# Patient Record
Sex: MALE | Race: WHITE | Employment: OTHER | URBAN - METROPOLITAN AREA
[De-identification: names, ages, dates, MRNs, and addresses within clinical notes are randomized per-mention and may not be internally consistent; named-entity substitution may affect disease eponyms.]

---

## 2017-01-04 ENCOUNTER — TRANSFERRED RECORDS (OUTPATIENT)
Dept: HEALTH INFORMATION MANAGEMENT | Facility: CLINIC | Age: 62
End: 2017-01-04

## 2017-03-06 ENCOUNTER — OFFICE VISIT (OUTPATIENT)
Dept: GASTROENTEROLOGY | Facility: CLINIC | Age: 62
End: 2017-03-06
Attending: INTERNAL MEDICINE
Payer: COMMERCIAL

## 2017-03-06 VITALS
HEART RATE: 96 BPM | HEIGHT: 71 IN | OXYGEN SATURATION: 97 % | TEMPERATURE: 98.1 F | WEIGHT: 172.5 LBS | SYSTOLIC BLOOD PRESSURE: 151 MMHG | DIASTOLIC BLOOD PRESSURE: 78 MMHG | BODY MASS INDEX: 24.15 KG/M2

## 2017-03-06 DIAGNOSIS — K74.60 CIRRHOSIS OF LIVER WITHOUT ASCITES, UNSPECIFIED HEPATIC CIRRHOSIS TYPE (H): Primary | ICD-10-CM

## 2017-03-06 DIAGNOSIS — K74.60 CIRRHOSIS OF LIVER WITH ASCITES, UNSPECIFIED HEPATIC CIRRHOSIS TYPE (H): ICD-10-CM

## 2017-03-06 DIAGNOSIS — R18.8 CIRRHOSIS OF LIVER WITH ASCITES, UNSPECIFIED HEPATIC CIRRHOSIS TYPE (H): ICD-10-CM

## 2017-03-06 DIAGNOSIS — K74.60 CIRRHOSIS OF LIVER WITHOUT ASCITES, UNSPECIFIED HEPATIC CIRRHOSIS TYPE (H): ICD-10-CM

## 2017-03-06 LAB
AFP SERPL-MCNC: 5.6 UG/L (ref 0–8)
ALBUMIN SERPL-MCNC: 3.2 G/DL (ref 3.4–5)
ALP SERPL-CCNC: 184 U/L (ref 40–150)
ALT SERPL W P-5'-P-CCNC: 28 U/L (ref 0–70)
ANION GAP SERPL CALCULATED.3IONS-SCNC: 8 MMOL/L (ref 3–14)
AST SERPL W P-5'-P-CCNC: 55 U/L (ref 0–45)
BILIRUB DIRECT SERPL-MCNC: 0.5 MG/DL (ref 0–0.2)
BILIRUB SERPL-MCNC: 1.2 MG/DL (ref 0.2–1.3)
BUN SERPL-MCNC: 9 MG/DL (ref 7–30)
CALCIUM SERPL-MCNC: 9 MG/DL (ref 8.5–10.1)
CHLORIDE SERPL-SCNC: 110 MMOL/L (ref 94–109)
CO2 SERPL-SCNC: 25 MMOL/L (ref 20–32)
CREAT SERPL-MCNC: 0.63 MG/DL (ref 0.66–1.25)
ERYTHROCYTE [DISTWIDTH] IN BLOOD BY AUTOMATED COUNT: 14.6 % (ref 10–15)
GFR SERPL CREATININE-BSD FRML MDRD: ABNORMAL ML/MIN/1.7M2
GLUCOSE SERPL-MCNC: 120 MG/DL (ref 70–99)
HCT VFR BLD AUTO: 36.3 % (ref 40–53)
HGB BLD-MCNC: 12.5 G/DL (ref 13.3–17.7)
INR PPP: 1.22 (ref 0.86–1.14)
MCH RBC QN AUTO: 31.3 PG (ref 26.5–33)
MCHC RBC AUTO-ENTMCNC: 34.4 G/DL (ref 31.5–36.5)
MCV RBC AUTO: 91 FL (ref 78–100)
PLATELET # BLD AUTO: 113 10E9/L (ref 150–450)
POTASSIUM SERPL-SCNC: 4.2 MMOL/L (ref 3.4–5.3)
PROT SERPL-MCNC: 6.2 G/DL (ref 6.8–8.8)
RBC # BLD AUTO: 4 10E12/L (ref 4.4–5.9)
SODIUM SERPL-SCNC: 142 MMOL/L (ref 133–144)
WBC # BLD AUTO: 12.4 10E9/L (ref 4–11)

## 2017-03-06 PROCEDURE — 90732 PPSV23 VACC 2 YRS+ SUBQ/IM: CPT | Mod: ZF

## 2017-03-06 PROCEDURE — 25000128 H RX IP 250 OP 636: Mod: ZF

## 2017-03-06 PROCEDURE — 99212 OFFICE O/P EST SF 10 MIN: CPT | Mod: 25,ZF

## 2017-03-06 PROCEDURE — 80048 BASIC METABOLIC PNL TOTAL CA: CPT | Performed by: INTERNAL MEDICINE

## 2017-03-06 PROCEDURE — 87522 HEPATITIS C REVRS TRNSCRPJ: CPT | Performed by: INTERNAL MEDICINE

## 2017-03-06 PROCEDURE — 82105 ALPHA-FETOPROTEIN SERUM: CPT | Performed by: INTERNAL MEDICINE

## 2017-03-06 PROCEDURE — 36415 COLL VENOUS BLD VENIPUNCTURE: CPT | Performed by: INTERNAL MEDICINE

## 2017-03-06 PROCEDURE — 80076 HEPATIC FUNCTION PANEL: CPT | Performed by: INTERNAL MEDICINE

## 2017-03-06 PROCEDURE — G0009 ADMIN PNEUMOCOCCAL VACCINE: HCPCS | Mod: ZF

## 2017-03-06 PROCEDURE — 85610 PROTHROMBIN TIME: CPT | Performed by: INTERNAL MEDICINE

## 2017-03-06 PROCEDURE — 85027 COMPLETE CBC AUTOMATED: CPT | Performed by: INTERNAL MEDICINE

## 2017-03-06 RX ORDER — AMILORIDE HYDROCHLORIDE 5 MG/1
10 TABLET ORAL DAILY
Qty: 180 TABLET | Refills: 3 | Status: SHIPPED | OUTPATIENT
Start: 2017-03-06 | End: 2018-03-28

## 2017-03-06 ASSESSMENT — PAIN SCALES - GENERAL: PAINLEVEL: MODERATE PAIN (5)

## 2017-03-06 NOTE — LETTER
3/6/2017       RE: Mehdi Martinez  [address] [facility] [address]     Dear Colleague,    Thank you for referring your patient, Mehdi Martinez, to the [facility]PAT[facility] at [facility]. Please see a copy of my visit note below.    I had the pleasure of seeing Mehdi Martinez for followup in the Liver Clinic at the [facility] on 03/05/2017.  Mr. 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  He did undergo a TIPS procedure.  He has been treated for his hepatitis C, but I am not sure he has actually had an HCV RNA 3 months post-transplant to determine whether he is a sustained virologic responder.      His new complaint at this visit is that he has painful gynecomastia.  He saw his oncologist who obtained mammograms which reportedly were okay.  This is certainly due to his spironolactone.      He otherwise denies any abdominal pain, itching or skin rash.  He does have mild fatigue.  He denies any increased abdominal girth or lower extremity edema.  He denies any fevers or chills, cough or shortness of breath.  He denies any nausea, vomiting or constipation.  He is having 1-3 bowel movements per day on his current dose of lactulose.  He has not had any gastrointestinal bleeding or any overt signs of encephalopathy.       Current Outpatient Prescriptions   Medication     aMILoride (MIDAMOR) 5 MG tablet     lactulose (CHRONULAC) 10 GM/15ML solution     rifaximin (XIFAXAN) 550 MG TABS     furosemide (LASIX) 40 MG tablet     zinc sulfate (ZINCATE) 220 MG capsule     metFORMIN (GLUCOPHAGE) 1000 MG tablet     cetirizine (ZYRTEC) 10 MG tablet     fluticasone (FLONASE) 50 MCG/ACT nasal spray     No current facility-administered medications for this visit.      B/P: 151/78, T: 98.1, P: 96, R: Data Unavailable    HEENT exam shows no scleral icterus.  He has mild temporal muscle wasting.  His  chest is clear.  His abdominal exam shows no increase in girth.  No masses or tenderness to palpation are present.  His liver is 10-11 cm in span with a slightly prominent left lobe.  No spleen tip is palpable, and extremity exam shows no edema.  Skin exam shows some palmar erythema without spider angiomata, and neurologic exam shows no asterixis.       Recent Results (from the past 168 hour(s))    Hepatic Panel [LAB20]    Collection Time: 03/06/17 11:29 AM   Result Value Ref Range    Bilirubin Direct 0.5 (H) 0.0 - 0.2 mg/dL    Bilirubin Total 1.2 0.2 - 1.3 mg/dL    Albumin 3.2 (L) 3.4 - 5.0 g/dL    Protein Total 6.2 (L) 6.8 - 8.8 g/dL    Alkaline Phosphatase 184 (H) 40 - 150 U/L    ALT 28 0 - 70 U/L    AST 55 (H) 0 - 45 U/L   Basic metabolic panel [LAB15]    Collection Time: 03/06/17 11:29 AM   Result Value Ref Range    Sodium 142 133 - 144 mmol/L    Potassium 4.2 3.4 - 5.3 mmol/L    Chloride 110 (H) 94 - 109 mmol/L    Carbon Dioxide 25 20 - 32 mmol/L    Anion Gap 8 3 - 14 mmol/L    Glucose 120 (H) 70 - 99 mg/dL    Urea Nitrogen 9 7 - 30 mg/dL    Creatinine 0.63 (L) 0.66 - 1.25 mg/dL    GFR Estimate >90  Non  GFR Calc   >60 mL/min/1.7m2    GFR Estimate If Black >90   GFR Calc   >60 mL/min/1.7m2    Calcium 9.0 8.5 - 10.1 mg/dL   CBC with platelets [VAC838]    Collection Time: 03/06/17 11:29 AM   Result Value Ref Range    WBC 12.4 (H) 4.0 - 11.0 10e9/L    RBC Count 4.00 (L) 4.4 - 5.9 10e12/L    Hemoglobin 12.5 (L) 13.3 - 17.7 g/dL    Hematocrit 36.3 (L) 40.0 - 53.0 %    MCV 91 78 - 100 fl    MCH 31.3 26.5 - 33.0 pg    MCHC 34.4 31.5 - 36.5 g/dL    RDW 14.6 10.0 - 15.0 %    Platelet Count 113 (L) 150 - 450 10e9/L   INR [[id number]]    Collection Time: 03/06/17 11:29 AM   Result Value Ref Range    INR 1.22 (H) 0.86 - 1.14   AFP tumor marker [BUV406]    Collection Time: 03/06/17 11:29 AM   Result Value Ref Range    Alpha Fetoprotein 5.6 0 - 8 ug/L      I did review his ultrasound which shows  good TIPS flow velocities.  He had just a trivial amount of ascites, and no mass lesions are noted.      My impression is that Mr. 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  I will get an HCV RNA level today to determine whether he is a sustained virologic responder or not.  His TIPS seems to be working well.  I will discontinue the spironolactone which is certainly causing his painful gynecomastia and switch him to amiloride 10 mg per day.      He does need a Pneumovax today to finish his vaccination schedule.  He also needs a bone density study done, which I have recommended he have done in [place].  Otherwise, plan will be to see him back in the clinic in 6 months with a repeat ultrasound and blood work.      Thank you very much for allowing me to participate in the care of this patient.  If you have any questions regarding my recommendations, please do not hesitate to contact me.       Juancho Willard MD      Professor of Medicine  [employer]      Executive Medical Director, Solid Organ Transplant Program  [facility]

## 2017-03-06 NOTE — LETTER
March 7, 2017       TO: Mehdi Martinez  formerly [facility] [address] [facility] [address]       Dear Mr. Martinez,    We are writing to inform you of your test results.  Discussed in clinic.    Resulted Orders    Hepatic Panel [LAB20]   Result Value Ref Range    Bilirubin Direct 0.5 (H) 0.0 - 0.2 mg/dL    Bilirubin Total 1.2 0.2 - 1.3 mg/dL    Albumin 3.2 (L) 3.4 - 5.0 g/dL    Protein Total 6.2 (L) 6.8 - 8.8 g/dL    Alkaline Phosphatase 184 (H) 40 - 150 U/L    ALT 28 0 - 70 U/L    AST 55 (H) 0 - 45 U/L   Basic metabolic panel [LAB15]   Result Value Ref Range    Sodium 142 133 - 144 mmol/L    Potassium 4.2 3.4 - 5.3 mmol/L    Chloride 110 (H) 94 - 109 mmol/L    Carbon Dioxide 25 20 - 32 mmol/L    Anion Gap 8 3 - 14 mmol/L    Glucose 120 (H) 70 - 99 mg/dL    Urea Nitrogen 9 7 - 30 mg/dL    Creatinine 0.63 (L) 0.66 - 1.25 mg/dL    GFR Estimate >90  Non  GFR Calc   >60 mL/min/1.7m2    GFR Estimate If Black >90   GFR Calc   >60 mL/min/1.7m2    Calcium 9.0 8.5 - 10.1 mg/dL   CBC with platelets [MFR535]   Result Value Ref Range    WBC 12.4 (H) 4.0 - 11.0 10e9/L    RBC Count 4.00 (L) 4.4 - 5.9 10e12/L    Hemoglobin 12.5 (L) 13.3 - 17.7 g/dL    Hematocrit 36.3 (L) 40.0 - 53.0 %    MCV 91 78 - 100 fl    MCH 31.3 26.5 - 33.0 pg    MCHC 34.4 31.5 - 36.5 g/dL    RDW 14.6 10.0 - 15.0 %    Platelet Count 113 (L) 150 - 450 10e9/L   INR [[id number]]   Result Value Ref Range    INR 1.22 (H) 0.86 - 1.14   AFP tumor marker [RXQ125]   Result Value Ref Range    Alpha Fetoprotein 5.6 0 - 8 ug/L      Comment:      Assay Method:  Chemiluminescence using Siemens Centaur XP         It was a pleasure to see you at your recent visit. Please let me know if you have any questions or concerns.     Clinic Staff - [phone]     Sincerely,     Juancho Willard MD  [address] [place] [address] [address] [place] [id number]

## 2017-03-06 NOTE — MR AVS SNAPSHOT
After Visit Summary   3/6/2017    Mehdi Martinez    MRN: [id number]           Patient Information     Date Of Birth          1955        Visit Information        Provider Department      3/6/2017 1:45 PM Juancho Willard MD [facility]        Today's Diagnoses     Cirrhosis of liver without ascites, unspecified hepatic cirrhosis type (H)    -  1       Follow-ups after your visit        Your next 10 appointments already scheduled     Sep 05, 2017 10:00 AM CDT   Lab with  LAB   [facility] ([facility])    [address] [place] [id number] [phone]            Sep 05, 2017 10:30 AM CDT   US ABDOMEN COMPLETE with US[address] [place] [address] [facility] ([facility])    [address] [place] [id number] [phone]           Please bring a list of your medicines (including vitamins, minerals and over-the-counter drugs). Also, tell your doctor about any allergies you may have. Wear comfortable clothes and leave your valuables at home.  Adults: No eating or drinking for 8 hours before the exam. You may take medicine with a small sip of water.  Children: - Children 6+ years: No food or drink for 6 hours before exam. - Children 1-5 years: No food or drink for 4 hours before exam. - Infants, breast-fed: may have breast milk up to 2 hours before exam. - Infants, formula: may have bottle until 4 hours before exam.  Please call the Imaging Department at your exam site with any questions.            Sep 05, 2017 11:30 AM CDT   (Arrive by 11:15 AM)   Return General Liver with Juancho Willard MD   [facility] ([facility])    [address] [place] [id number] [phone]              Future tests that were ordered for you today     Open Standing Orders        Priority Remaining Interval Expires Ordered    US abdomen complete [IEN952] Routine 2/2  "Every 6 Months 2018 3/6/2017          Open Future Orders        Priority Expected Expires Ordered     Hepatic Panel [LAB20] Routine 2017 2018 3/6/2017    Basic metabolic panel [LAB15] Routine 2017 2018 3/6/2017    CBC with platelets [QKF531] Routine 2017 2018 3/6/2017    INR [[id number]] Routine 2017 2018 3/6/2017    AFP tumor marker [DIX840] Routine 2017 2018 3/6/2017    Hepatitis C RNA quantitative Routine  2017 3/6/2017            Who to contact     If you have questions or need follow up information about today's clinic visit or your schedule please contact [facility]PAT[facility] directly at [phone].  Normal or non-critical lab and imaging results will be communicated to you by MyChart, letter or phone within 4 business days after the clinic has received the results. If you do not hear from us within 7 days, please contact the clinic through [employer] or phone. If you have a critical or abnormal lab result, we will notify you by phone as soon as possible.  Submit refill requests through [employer] or call your pharmacy and they will forward the refill request to us. Please allow 3 business days for your refill to be completed.          Additional Information About Your Visit        [employer] Information     [employer] lets you send messages to your doctor, view your test results, renew your prescriptions, schedule appointments and more. To sign up, go to [email or web address] . Click on \"Log in\" on the left side of the screen, which will take you to the Welcome page. Then click on \"Sign up Now\" on the right side of the page.     You will be asked to enter the access code listed below, as well as some personal information. Please follow the directions to create your username and password.     Your access code is: [email or web address]  Expires: 2017  1:59 PM     Your access code will  in 90 days. If you need help or a new code, please call your [place] clinic or " "[phone].        Care EveryWhere ID     This is your Care EveryWhere ID. This could be used by other organizations to access your [place] medical records  [id number]        Your Vitals Were     Pulse Temperature Height Pulse Oximetry BMI (Body Mass Index)       96 98.1  F (36.7  C) (Oral) 1.803 m (5' 11\") 97% 24.06 kg/m2        Blood Pressure from Last 3 Encounters:   03/06/17 151/78   11/29/16 148/81   10/18/16 149/78    Weight from Last 3 Encounters:   03/06/17 78.2 kg (172 lb 8 oz)   11/29/16 74.8 kg (165 lb)   10/18/16 82.2 kg (181 lb 3.2 oz)              We Performed the Following     Pneumococcal Vaccine Adult, SQ or IM     Schedule follow up appointments          Today's Medication Changes          These changes are accurate as of: 3/6/17  1:59 PM.  If you have any questions, ask your nurse or doctor.               Start taking these medicines.        Dose/Directions    aMILoride 5 MG tablet   Commonly known as:  MIDAMOR   Used for:  Cirrhosis of liver without ascites, unspecified hepatic cirrhosis type (H)   Started by:  Juancho Willard MD        Dose:  10 mg   Take 2 tablets (10 mg) by mouth daily   Quantity:  180 tablet   Refills:  3         These medicines have changed or have updated prescriptions.        Dose/Directions    furosemide 40 MG tablet   Commonly known as:  LASIX   This may have changed:  how much to take   Used for:  Alcoholic cirrhosis of liver with ascites (H)        Dose:  80 mg   Take 2 tablets (80 mg) by mouth daily   Quantity:  60 tablet   Refills:  3       lactulose 10 GM/15ML solution   Commonly known as:  CHRONULAC   This may have changed:    - when to take this  - additional instructions   Used for:  Hepatic encephalopathy (H)        Dose:  20 g   Take 30 mLs (20 g) by mouth 3 times daily Ensure you have 3 loose BMs/day.   Quantity:  8100 mL   Refills:  3         Stop taking these medicines if you haven't already. Please contact your care team if you have questions.     " spironolactone 25 MG tablet   Commonly known as:  ALDACTONE   Stopped by:  Juancho Willard MD                Where to get your medicines      These medications were sent to [facility] - [place], [place] - [address] [facility] [address]  [address] [facility] [place], [place] [address]     Phone:  [phone]     aMILoride 5 MG tablet                Primary Care Provider Office Phone # Fax #    Elvin BLUE Advernell [phone]       [place] [facility] [address]  [facility] [address]        Thank you!     Thank you for choosing [facility]PAT[facility]  for your care. Our goal is always to provide you with excellent care. Hearing back from our patients is one way we can continue to improve our services. Please take a few minutes to complete the written survey that you may receive in the mail after your visit with us. Thank you!             Your Updated Medication List - Protect others around you: Learn how to safely use, store and throw away your medicines at www.disposemymeds.org.          This list is accurate as of: 3/6/17  1:59 PM.  Always use your most recent med list.                   Brand Name Dispense Instructions for use    aMILoride 5 MG tablet    MIDAMOR    180 tablet    Take 2 tablets (10 mg) by mouth daily       cetirizine 10 MG tablet    zyrTEC     Take 10 mg by mouth Reported on 3/6/2017       fluticasone 50 MCG/ACT spray    FLONASE     2 sprays Reported on 3/6/2017       furosemide 40 MG tablet    LASIX    60 tablet    Take 2 tablets (80 mg) by mouth daily       lactulose 10 GM/15ML solution    CHRONULAC    8100 mL    Take 30 mLs (20 g) by mouth 3 times daily Ensure you have 3 loose BMs/day.       metFORMIN 1000 MG tablet    GLUCOPHAGE     Take 1,000 mg by mouth daily (with dinner)       rifaximin 550 MG Tabs tablet    XIFAXAN    60 tablet    Take 1 tablet (550 mg) by mouth 2 times daily       zinc sulfate 220 MG capsule    ZINCATE     Take 50 mg by mouth daily

## 2017-03-06 NOTE — NURSING NOTE
Chief Complaint   Patient presents with     RECHECK     Cirrhosis of Liver with ascites   Pt roomed, vitals, meds, and allergies reviewed with pt. Pt ready for provider.  Steve Mckinney, CMA

## 2017-03-06 NOTE — PROGRESS NOTES
I had the pleasure of seeing Mehdi Martinez for followup in the Liver Clinic at the [facility] on 03/05/2017.  Mr. 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  He did undergo a TIPS procedure.  He has been treated for his hepatitis C, but I am not sure he has actually had an HCV RNA 3 months post-transplant to determine whether he is a sustained virologic responder.      His new complaint at this visit is that he has painful gynecomastia.  He saw his oncologist who obtained mammograms which reportedly were okay.  This is certainly due to his spironolactone.      He otherwise denies any abdominal pain, itching or skin rash.  He does have mild fatigue.  He denies any increased abdominal girth or lower extremity edema.  He denies any fevers or chills, cough or shortness of breath.  He denies any nausea, vomiting or constipation.  He is having 1-3 bowel movements per day on his current dose of lactulose.  He has not had any gastrointestinal bleeding or any overt signs of encephalopathy.       Current Outpatient Prescriptions   Medication     aMILoride (MIDAMOR) 5 MG tablet     lactulose (CHRONULAC) 10 GM/15ML solution     rifaximin (XIFAXAN) 550 MG TABS     furosemide (LASIX) 40 MG tablet     zinc sulfate (ZINCATE) 220 MG capsule     metFORMIN (GLUCOPHAGE) 1000 MG tablet     cetirizine (ZYRTEC) 10 MG tablet     fluticasone (FLONASE) 50 MCG/ACT nasal spray     No current facility-administered medications for this visit.      B/P: 151/78, T: 98.1, P: 96, R: Data Unavailable    HEENT exam shows no scleral icterus.  He has mild temporal muscle wasting.  His chest is clear.  His abdominal exam shows no increase in girth.  No masses or tenderness to palpation are present.  His liver is 10-11 cm in span with a slightly prominent left lobe.  No spleen tip is palpable, and extremity exam shows no edema.  Skin exam shows some palmar  erythema without spider angiomata, and neurologic exam shows no asterixis.       Recent Results (from the past 168 hour(s))    Hepatic Panel [LAB20]    Collection Time: 03/06/17 11:29 AM   Result Value Ref Range    Bilirubin Direct 0.5 (H) 0.0 - 0.2 mg/dL    Bilirubin Total 1.2 0.2 - 1.3 mg/dL    Albumin 3.2 (L) 3.4 - 5.0 g/dL    Protein Total 6.2 (L) 6.8 - 8.8 g/dL    Alkaline Phosphatase 184 (H) 40 - 150 U/L    ALT 28 0 - 70 U/L    AST 55 (H) 0 - 45 U/L   Basic metabolic panel [LAB15]    Collection Time: 03/06/17 11:29 AM   Result Value Ref Range    Sodium 142 133 - 144 mmol/L    Potassium 4.2 3.4 - 5.3 mmol/L    Chloride 110 (H) 94 - 109 mmol/L    Carbon Dioxide 25 20 - 32 mmol/L    Anion Gap 8 3 - 14 mmol/L    Glucose 120 (H) 70 - 99 mg/dL    Urea Nitrogen 9 7 - 30 mg/dL    Creatinine 0.63 (L) 0.66 - 1.25 mg/dL    GFR Estimate >90  Non  GFR Calc   >60 mL/min/1.7m2    GFR Estimate If Black >90   GFR Calc   >60 mL/min/1.7m2    Calcium 9.0 8.5 - 10.1 mg/dL   CBC with platelets [VCR841]    Collection Time: 03/06/17 11:29 AM   Result Value Ref Range    WBC 12.4 (H) 4.0 - 11.0 10e9/L    RBC Count 4.00 (L) 4.4 - 5.9 10e12/L    Hemoglobin 12.5 (L) 13.3 - 17.7 g/dL    Hematocrit 36.3 (L) 40.0 - 53.0 %    MCV 91 78 - 100 fl    MCH 31.3 26.5 - 33.0 pg    MCHC 34.4 31.5 - 36.5 g/dL    RDW 14.6 10.0 - 15.0 %    Platelet Count 113 (L) 150 - 450 10e9/L   INR [[id number]]    Collection Time: 03/06/17 11:29 AM   Result Value Ref Range    INR 1.22 (H) 0.86 - 1.14   AFP tumor marker [IBE720]    Collection Time: 03/06/17 11:29 AM   Result Value Ref Range    Alpha Fetoprotein 5.6 0 - 8 ug/L      I did review his ultrasound which shows good TIPS flow velocities.  He had just a trivial amount of ascites, and no mass lesions are noted.      My impression is that Mr. 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  I will get an HCV RNA level today to determine whether he is a sustained virologic  responder or not.  His TIPS seems to be working well.  I will discontinue the spironolactone which is certainly causing his painful gynecomastia and switch him to amiloride 10 mg per day.      He does need a Pneumovax today to finish his vaccination schedule.  He also needs a bone density study done, which I have recommended he have done in [place].  Otherwise, plan will be to see him back in the clinic in 6 months with a repeat ultrasound and blood work.      Thank you very much for allowing me to participate in the care of this patient.  If you have any questions regarding my recommendations, please do not hesitate to contact me.       Juancho Willard MD      Professor of Medicine  [employer]      Executive Medical Director, Solid Organ Transplant Program  [facility]

## 2017-03-06 NOTE — LETTER
3/6/2017      RE: Mehdi Martinez  [address] [facility] [address]       I had the pleasure of seeing Mehdi Martinez for followup in the Liver Clinic at the [facility] on 03/05/2017.  Mr. 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  He did undergo a TIPS procedure.  He has been treated for his hepatitis C, but I am not sure he has actually had an HCV RNA 3 months post-transplant to determine whether he is a sustained virologic responder.      His new complaint at this visit is that he has painful gynecomastia.  He saw his oncologist who obtained mammograms which reportedly were okay.  This is certainly due to his spironolactone.      He otherwise denies any abdominal pain, itching or skin rash.  He does have mild fatigue.  He denies any increased abdominal girth or lower extremity edema.  He denies any fevers or chills, cough or shortness of breath.  He denies any nausea, vomiting or constipation.  He is having 1-3 bowel movements per day on his current dose of lactulose.  He has not had any gastrointestinal bleeding or any overt signs of encephalopathy.       Current Outpatient Prescriptions   Medication     aMILoride (MIDAMOR) 5 MG tablet     lactulose (CHRONULAC) 10 GM/15ML solution     rifaximin (XIFAXAN) 550 MG TABS     furosemide (LASIX) 40 MG tablet     zinc sulfate (ZINCATE) 220 MG capsule     metFORMIN (GLUCOPHAGE) 1000 MG tablet     cetirizine (ZYRTEC) 10 MG tablet     fluticasone (FLONASE) 50 MCG/ACT nasal spray     No current facility-administered medications for this visit.      B/P: 151/78, T: 98.1, P: 96, R: Data Unavailable    HEENT exam shows no scleral icterus.  He has mild temporal muscle wasting.  His chest is clear.  His abdominal exam shows no increase in girth.  No masses or tenderness to palpation are present.  His liver is 10-11 cm in span with a slightly prominent left lobe.  No spleen tip  is palpable, and extremity exam shows no edema.  Skin exam shows some palmar erythema without spider angiomata, and neurologic exam shows no asterixis.       Recent Results (from the past 168 hour(s))    Hepatic Panel [LAB20]    Collection Time: 03/06/17 11:29 AM   Result Value Ref Range    Bilirubin Direct 0.5 (H) 0.0 - 0.2 mg/dL    Bilirubin Total 1.2 0.2 - 1.3 mg/dL    Albumin 3.2 (L) 3.4 - 5.0 g/dL    Protein Total 6.2 (L) 6.8 - 8.8 g/dL    Alkaline Phosphatase 184 (H) 40 - 150 U/L    ALT 28 0 - 70 U/L    AST 55 (H) 0 - 45 U/L   Basic metabolic panel [LAB15]    Collection Time: 03/06/17 11:29 AM   Result Value Ref Range    Sodium 142 133 - 144 mmol/L    Potassium 4.2 3.4 - 5.3 mmol/L    Chloride 110 (H) 94 - 109 mmol/L    Carbon Dioxide 25 20 - 32 mmol/L    Anion Gap 8 3 - 14 mmol/L    Glucose 120 (H) 70 - 99 mg/dL    Urea Nitrogen 9 7 - 30 mg/dL    Creatinine 0.63 (L) 0.66 - 1.25 mg/dL    GFR Estimate >90  Non  GFR Calc   >60 mL/min/1.7m2    GFR Estimate If Black >90   GFR Calc   >60 mL/min/1.7m2    Calcium 9.0 8.5 - 10.1 mg/dL   CBC with platelets [[id number]]    Collection Time: 03/06/17 11:29 AM   Result Value Ref Range    WBC 12.4 (H) 4.0 - 11.0 10e9/L    RBC Count 4.00 (L) 4.4 - 5.9 10e12/L    Hemoglobin 12.5 (L) 13.3 - 17.7 g/dL    Hematocrit 36.3 (L) 40.0 - 53.0 %    MCV 91 78 - 100 fl    MCH 31.3 26.5 - 33.0 pg    MCHC 34.4 31.5 - 36.5 g/dL    RDW 14.6 10.0 - 15.0 %    Platelet Count 113 (L) 150 - 450 10e9/L   INR [[id number]]    Collection Time: 03/06/17 11:29 AM   Result Value Ref Range    INR 1.22 (H) 0.86 - 1.14   AFP tumor marker [[id number]]    Collection Time: 03/06/17 11:29 AM   Result Value Ref Range    Alpha Fetoprotein 5.6 0 - 8 ug/L      I did review his ultrasound which shows good TIPS flow velocities.  He had just a trivial amount of ascites, and no mass lesions are noted.      My impression is that Mr. 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  I will get  an HCV RNA level today to determine whether he is a sustained virologic responder or not.  His TIPS seems to be working well.  I will discontinue the spironolactone which is certainly causing his painful gynecomastia and switch him to amiloride 10 mg per day.      He does need a Pneumovax today to finish his vaccination schedule.  He also needs a bone density study done, which I have recommended he have done in [place].  Otherwise, plan will be to see him back in the clinic in 6 months with a repeat ultrasound and blood work.      Thank you very much for allowing me to participate in the care of this patient.  If you have any questions regarding my recommendations, please do not hesitate to contact me.       Juancho Willard MD      Professor of Medicine  [employer]      Executive Medical Director, Solid Organ Transplant Program  [facility]

## 2017-03-06 NOTE — LETTER
March 9, 2017       TO: Mehdi Martinez  [facility] [address] [facility] [address]       Dear Mr. Martinez,    We are writing to inform you of your test results.  Cured !    Resulted Orders    Hepatic Panel [LAB20]   Result Value Ref Range    Bilirubin Direct 0.5 (H) 0.0 - 0.2 mg/dL    Bilirubin Total 1.2 0.2 - 1.3 mg/dL    Albumin 3.2 (L) 3.4 - 5.0 g/dL    Protein Total 6.2 (L) 6.8 - 8.8 g/dL    Alkaline Phosphatase 184 (H) 40 - 150 U/L    ALT 28 0 - 70 U/L    AST 55 (H) 0 - 45 U/L   Basic metabolic panel [LAB15]   Result Value Ref Range    Sodium 142 133 - 144 mmol/L    Potassium 4.2 3.4 - 5.3 mmol/L    Chloride 110 (H) 94 - 109 mmol/L    Carbon Dioxide 25 20 - 32 mmol/L    Anion Gap 8 3 - 14 mmol/L    Glucose 120 (H) 70 - 99 mg/dL    Urea Nitrogen 9 7 - 30 mg/dL    Creatinine 0.63 (L) 0.66 - 1.25 mg/dL    GFR Estimate >90  Non  GFR Calc   >60 mL/min/1.7m2    GFR Estimate If Black >90   GFR Calc   >60 mL/min/1.7m2    Calcium 9.0 8.5 - 10.1 mg/dL   CBC with platelets [[id number]]   Result Value Ref Range    WBC 12.4 (H) 4.0 - 11.0 10e9/L    RBC Count 4.00 (L) 4.4 - 5.9 10e12/L    Hemoglobin 12.5 (L) 13.3 - 17.7 g/dL    Hematocrit 36.3 (L) 40.0 - 53.0 %    MCV 91 78 - 100 fl    MCH 31.3 26.5 - 33.0 pg    MCHC 34.4 31.5 - 36.5 g/dL    RDW 14.6 10.0 - 15.0 %    Platelet Count 113 (L) 150 - 450 10e9/L   INR [[id number]]   Result Value Ref Range    INR 1.22 (H) 0.86 - 1.14   AFP tumor marker [[id number]]   Result Value Ref Range    Alpha Fetoprotein 5.6 0 - 8 ug/L      Comment:      Assay Method:  Chemiluminescence using Siemens Centaur XP   Hepatitis C RNA quantitative   Result Value Ref Range    HCV RNA Quant IU/ml  HCVND [IU]/mL     HCV RNA Not Detected   The SAPPHIRE AmpliPrep/SAPPHIRE TaqMan HCV Test is an FDA-approved in vitro nucleic   acid amplification test for the quantitation of HCV RNA in human plasma (ETDA   plasma) or serum using the SAPPHIRE AmpliPrep Instrument for automated viral   nucleic  acid extraction and the SAPPHIRE TaqMan Analyzer or SAPPHIRE TaqMan for   automated Real Time PCR amplification and detection of the viral nucleic acid   target.   Titer results are reported in International Units/mL (IU/mL) using the 1st WHO   International standard for HCV for Nucleic Acid Amplification based assays.      Log of HCV RNA Qt Not Calculated <1.2 Log IU/mL         It was a pleasure to see you at your recent visit. Please let me know if you have any questions or concerns.     Clinic Staff - [phone]     Sincerely,     Juancho Willrad MD  [address] [place] [id number]

## 2017-03-09 LAB
HCV RNA SERPL NAA+PROBE-ACNC: NORMAL [IU]/ML
HCV RNA SERPL NAA+PROBE-LOG IU: NORMAL LOG IU/ML

## 2017-04-21 ENCOUNTER — TRANSFERRED RECORDS (OUTPATIENT)
Dept: HEALTH INFORMATION MANAGEMENT | Facility: CLINIC | Age: 62
End: 2017-04-21

## 2017-06-09 ENCOUNTER — TRANSFERRED RECORDS (OUTPATIENT)
Dept: HEALTH INFORMATION MANAGEMENT | Facility: CLINIC | Age: 62
End: 2017-06-09

## 2017-06-09 DIAGNOSIS — K65.2 SBP (SPONTANEOUS BACTERIAL PERITONITIS) (H): ICD-10-CM

## 2017-06-09 RX ORDER — SULFAMETHOXAZOLE/TRIMETHOPRIM 800-160 MG
TABLET ORAL
Qty: 90 TABLET | Refills: 3 | Status: SHIPPED | OUTPATIENT
Start: 2017-06-09 | End: 2021-09-01

## 2017-06-09 NOTE — NURSING NOTE
LVM for patient that per Dr. Willard patient should continue taken Sulfamethoxazole-trimethoprim as ordered.  Med refilled.      RE: Sulfamethoxazole-trimethoprim  Received: Yesterday       Juancho Willard MD Beckenbach, Shannon, LPN                   Yes.            Previous Messages       ----- Message -----      From: Morena Crowley LPN      Sent: 6/8/2017  10:21 AM        To: Juancho Willard MD   Subject: Sulfamethoxazole-trimethoprim                     Patient wondering if you wanted him to refill the above medication?  Not listed on current med list, but is listed in history.

## 2017-06-16 ENCOUNTER — TELEPHONE (OUTPATIENT)
Dept: GASTROENTEROLOGY | Facility: CLINIC | Age: 62
End: 2017-06-16

## 2017-06-16 DIAGNOSIS — K74.69 OTHER CIRRHOSIS OF LIVER (H): Primary | ICD-10-CM

## 2017-06-16 NOTE — TELEPHONE ENCOUNTER
Discussed below symptoms with Dr. Willard in clinic.  Per Dr. Willard patient should follow-up with his PCP again if patient feels symptoms have not improved or have become worse or be seen in the ED.  Writer called back patient and spoke with his wife Emmie pt was out taken a walk.  Emmie voiced understanding and stated she was going to call their childern and try and convince pt to be see in the ED if unable to get appointment to see his primary today.        Previous Messages       ----- Message -----      From: Morena Crowley LPN      Sent: 6/15/2017  10:10 AM        To: Juancho Willard MD   Subject: Symptoms                                         Patient called stating he feels like he has pleurisy c/o chest pain that worsens during breathing x 7 wks, also not sleeping well.  He has followed up with both his PCP & oncologist who cant find anything wrong, pt said they did blood work and CXR.  He is wanting your input on the situation, could it be a side effect of one of his medications?

## 2017-06-19 NOTE — TELEPHONE ENCOUNTER
Writer called patient this morning to follow-up, pt answered the phone and reports that symptoms have improved a little he did not see his PCP on Friday or goto the ER over the weekend.  Us with doppler ordered by Dr. Willard to check TIPS patency , order faxed for [facility] imaging this morning, patient updated and will call and schedule own imaging appointment.

## 2017-06-20 ENCOUNTER — TRANSFERRED RECORDS (OUTPATIENT)
Dept: HEALTH INFORMATION MANAGEMENT | Facility: CLINIC | Age: 62
End: 2017-06-20

## 2017-09-05 ENCOUNTER — OFFICE VISIT (OUTPATIENT)
Dept: GASTROENTEROLOGY | Facility: CLINIC | Age: 62
End: 2017-09-05
Attending: INTERNAL MEDICINE
Payer: COMMERCIAL

## 2017-09-05 VITALS
OXYGEN SATURATION: 98 % | BODY MASS INDEX: 24.64 KG/M2 | SYSTOLIC BLOOD PRESSURE: 151 MMHG | HEART RATE: 77 BPM | WEIGHT: 176 LBS | HEIGHT: 71 IN | DIASTOLIC BLOOD PRESSURE: 80 MMHG

## 2017-09-05 DIAGNOSIS — K70.30 ALCOHOLIC CIRRHOSIS OF LIVER WITHOUT ASCITES (H): Primary | ICD-10-CM

## 2017-09-05 DIAGNOSIS — K74.60 CIRRHOSIS OF LIVER WITHOUT ASCITES, UNSPECIFIED HEPATIC CIRRHOSIS TYPE (H): ICD-10-CM

## 2017-09-05 LAB
AFP SERPL-MCNC: 6 UG/L (ref 0–8)
ALBUMIN SERPL-MCNC: 3.6 G/DL (ref 3.4–5)
ALP SERPL-CCNC: 210 U/L (ref 40–150)
ALT SERPL W P-5'-P-CCNC: 43 U/L (ref 0–70)
ANION GAP SERPL CALCULATED.3IONS-SCNC: 7 MMOL/L (ref 3–14)
AST SERPL W P-5'-P-CCNC: 66 U/L (ref 0–45)
BILIRUB DIRECT SERPL-MCNC: 0.4 MG/DL (ref 0–0.2)
BILIRUB SERPL-MCNC: 1.3 MG/DL (ref 0.2–1.3)
BUN SERPL-MCNC: 8 MG/DL (ref 7–30)
CALCIUM SERPL-MCNC: 9.2 MG/DL (ref 8.5–10.1)
CHLORIDE SERPL-SCNC: 109 MMOL/L (ref 94–109)
CO2 SERPL-SCNC: 25 MMOL/L (ref 20–32)
CREAT SERPL-MCNC: 0.74 MG/DL (ref 0.66–1.25)
ERYTHROCYTE [DISTWIDTH] IN BLOOD BY AUTOMATED COUNT: 14.9 % (ref 10–15)
GFR SERPL CREATININE-BSD FRML MDRD: >90 ML/MIN/1.7M2
GLUCOSE SERPL-MCNC: 86 MG/DL (ref 70–99)
HCT VFR BLD AUTO: 42.9 % (ref 40–53)
HGB BLD-MCNC: 14.1 G/DL (ref 13.3–17.7)
INR PPP: 1.16 (ref 0.86–1.14)
MCH RBC QN AUTO: 30.8 PG (ref 26.5–33)
MCHC RBC AUTO-ENTMCNC: 32.9 G/DL (ref 31.5–36.5)
MCV RBC AUTO: 94 FL (ref 78–100)
PLATELET # BLD AUTO: 96 10E9/L (ref 150–450)
POTASSIUM SERPL-SCNC: 4.2 MMOL/L (ref 3.4–5.3)
PROT SERPL-MCNC: 6.9 G/DL (ref 6.8–8.8)
RBC # BLD AUTO: 4.58 10E12/L (ref 4.4–5.9)
SODIUM SERPL-SCNC: 141 MMOL/L (ref 133–144)
WBC # BLD AUTO: 17.9 10E9/L (ref 4–11)

## 2017-09-05 PROCEDURE — 99212 OFFICE O/P EST SF 10 MIN: CPT | Mod: ZF

## 2017-09-05 PROCEDURE — 82105 ALPHA-FETOPROTEIN SERUM: CPT | Performed by: INTERNAL MEDICINE

## 2017-09-05 PROCEDURE — 36415 COLL VENOUS BLD VENIPUNCTURE: CPT | Performed by: INTERNAL MEDICINE

## 2017-09-05 PROCEDURE — 85027 COMPLETE CBC AUTOMATED: CPT | Performed by: INTERNAL MEDICINE

## 2017-09-05 PROCEDURE — 80076 HEPATIC FUNCTION PANEL: CPT | Performed by: INTERNAL MEDICINE

## 2017-09-05 PROCEDURE — 80048 BASIC METABOLIC PNL TOTAL CA: CPT | Performed by: INTERNAL MEDICINE

## 2017-09-05 PROCEDURE — 85610 PROTHROMBIN TIME: CPT | Performed by: INTERNAL MEDICINE

## 2017-09-05 NOTE — MR AVS SNAPSHOT
After Visit Summary   9/5/2017    Mehdi Martinez    MRN: [id number]           Patient Information     Date Of Birth          1955        Visit Information        Provider Department      9/5/2017 11:30 AM Juancho Willard MD [facility] Hepatology        Today's Diagnoses     Alcoholic cirrhosis of liver without ascites (H)    -  1       Follow-ups after your visit        Follow-up notes from your care team     Return in about 6 months (around 3/5/2018).      Your next 10 appointments already scheduled     Mar 06, 2018 10:00 AM CST   Lab with  LAB   [facility] Lab ([facility])    [address] [place] [address] [id number] [phone]            Mar 06, 2018 10:30 AM CST   US ABDOMEN COMPLETE with US2   [facility] US ([facility])    [address] [place] [address] [phone] [phone]           Please bring a list of your medicines (including vitamins, minerals and over-the-counter drugs). Also, tell your doctor about any allergies you may have. Wear comfortable clothes and leave your valuables at home.  Adults: No eating or drinking for 8 hours before the exam. You may take medicine with a small sip of water.  Children: - Children 6+ years: No food or drink for 6 hours before exam. - Children 1-5 years: No food or drink for 4 hours before exam. - Infants, breast-fed: may have breast milk up to 2 hours before exam. - Infants, formula: may have bottle until 4 hours before exam.  Please call the Imaging Department at your exam site with any questions.            Mar 06, 2018 11:30 AM CST   (Arrive by 11:15 AM)   Return General Liver with Juancho Willard MD   [facility] Hepatology ([facility])    [address] [place] [address] [id number] [phone]              Future tests that were ordered for you today     Open Standing Orders        Priority Remaining  "Interval Expires Ordered    US abdomen complete [KND311] Repeat 2/2 Every 6 Months 9/5/2018 9/5/2017          Open Future Orders        Priority Expected Expires Ordered    US Abdomen Complete Routine  9/5/2018 9/5/2017    US Abdomen Complete Routine  9/5/2018 9/5/2017    Hepatic Panel [LAB20] Routine 3/4/2018 9/5/2018 9/5/2017    Basic metabolic panel [LAB15] Routine 3/4/2018 9/5/2018 9/5/2017    CBC with platelets [JTC057] Routine 3/4/2018 9/5/2018 9/5/2017    INR [[id number]] Routine 3/4/2018 9/5/2018 9/5/2017    AFP tumor marker [DQJ510] Routine 3/4/2018 9/5/2018 9/5/2017            Who to contact     If you have questions or need follow up information about today's clinic visit or your schedule please contact [facility] HEPATOLOGY directly at [phone].  Normal or non-critical lab and imaging results will be communicated to you by [employer]el[employer], letter or phone within 4 business days after the clinic has received the results. If you do not hear from us within 7 days, please contact the clinic through Akvot or phone. If you have a critical or abnormal lab result, we will notify you by phone as soon as possible.  Submit refill requests through Dogster or call your pharmacy and they will forward the refill request to us. Please allow 3 business days for your refill to be completed.          Additional Information About Your Visit        Dogster Information     Dogster lets you send messages to your doctor, view your test results, renew your prescriptions, schedule appointments and more. To sign up, go to [email or web address] . Click on \"Log in\" on the left side of the screen, which will take you to the Welcome page. Then click on \"Sign up Now\" on the right side of the page.     You will be asked to enter the access code listed below, as well as some personal information. Please follow the directions to create your username and password.     Your access code is: [email or web address]  Expires: 11/20/2017  6:30 AM     Your " "access code will  in 90 days. If you need help or a new code, please call your [place] clinic or [phone].        Care EveryWhere ID     This is your Care EveryWhere ID. This could be used by other organizations to access your [place] medical records  [id number]        Your Vitals Were     Pulse Height Pulse Oximetry BMI (Body Mass Index)          77 1.803 m (5' 11\") 98% 24.55 kg/m2         Blood Pressure from Last 3 Encounters:   17 151/80   17 151/78   16 148/81    Weight from Last 3 Encounters:   17 79.8 kg (176 lb)   17 78.2 kg (172 lb 8 oz)   16 74.8 kg (165 lb)              We Performed the Following     Schedule follow up appointments          Today's Medication Changes          These changes are accurate as of: 17 11:46 AM.  If you have any questions, ask your nurse or doctor.               These medicines have changed or have updated prescriptions.        Dose/Directions    furosemide 40 MG tablet   Commonly known as:  LASIX   This may have changed:  how much to take   Used for:  Alcoholic cirrhosis of liver with ascites (H)        Dose:  80 mg   Take 2 tablets (80 mg) by mouth daily   Quantity:  60 tablet   Refills:  3       lactulose 10 GM/15ML solution   Commonly known as:  CHRONULAC   This may have changed:    - when to take this  - additional instructions   Used for:  Hepatic encephalopathy (H)        Dose:  20 g   Take 30 mLs (20 g) by mouth 3 times daily Ensure you have 3 loose BMs/day.   Quantity:  8100 mL   Refills:  3                Primary Care Provider Office Phone # Fax #    Elvin SU Alonzo [phone]       [facility] [address]  [facility] [address]        Equal Access to Services     MARIAN CHAMBERLAIN AH: Ninfa Lynn, waaxda luqadaha, qaybta kaalmada adeegyada, nicolás card. Ana [facility] [phone].    ATENCIÓN: Si habla español, tiene a marc disposición servicios gratuitos de " asistencia lingüística. Bailey al [phone].    We comply with applicable federal civil rights laws and [place] laws. We do not discriminate on the basis of race, color, national origin, age, disability sex, sexual orientation or gender identity.            Thank you!     Thank you for choosing [facility] HEPATOLOGY  for your care. Our goal is always to provide you with excellent care. Hearing back from our patients is one way we can continue to improve our services. Please take a few minutes to complete the written survey that you may receive in the mail after your visit with us. Thank you!             Your Updated Medication List - Protect others around you: Learn how to safely use, store and throw away your medicines at www.disposemymeds.org.          This list is accurate as of: 9/5/17 11:46 AM.  Always use your most recent med list.                   Brand Name Dispense Instructions for use Diagnosis    aMILoride 5 MG tablet    MIDAMOR    180 tablet    Take 2 tablets (10 mg) by mouth daily    Cirrhosis of liver without ascites, unspecified hepatic cirrhosis type (H)       cetirizine 10 MG tablet    zyrTEC     Take 10 mg by mouth Reported on 3/6/2017        fluticasone 50 MCG/ACT spray    FLONASE     2 sprays Reported on 3/6/2017        furosemide 40 MG tablet    LASIX    60 tablet    Take 2 tablets (80 mg) by mouth daily    Alcoholic cirrhosis of liver with ascites (H)       lactulose 10 GM/15ML solution    CHRONULAC    8100 mL    Take 30 mLs (20 g) by mouth 3 times daily Ensure you have 3 loose BMs/day.    Hepatic encephalopathy (H)       metFORMIN 1000 MG tablet    GLUCOPHAGE     Take 1,000 mg by mouth daily (with dinner)        Pseudoephedrine-APAP  MG Tabs      Take 300 mg by mouth daily        rifaximin 550 MG Tabs tablet    XIFAXAN    60 tablet    Take 1 tablet (550 mg) by mouth 2 times daily    Hepatic encephalopathy (H)       sulfamethoxazole-trimethoprim 800-160 MG per tablet    BACTRIM DS     90 tablet    Take 1 tablet daily.    SBP (spontaneous bacterial peritonitis) (H)       zinc sulfate 220 (50 ZN) MG capsule    ZINCATE     Take 50 mg by mouth daily

## 2017-09-05 NOTE — NURSING NOTE
"Chief Complaint   Patient presents with     RECHECK     Follow up Chronic hepatitis C without hepatic coma.       Initial /80  Pulse 77  Ht 1.803 m (5' 11\")  Wt 79.8 kg (176 lb)  SpO2 98%  BMI 24.55 kg/m2 Estimated body mass index is 24.55 kg/(m^2) as calculated from the following:    Height as of this encounter: 1.803 m (5' 11\").    Weight as of this encounter: 79.8 kg (176 lb).  Medication Reconciliation: papi Barboza., CMA    "

## 2017-09-05 NOTE — PROGRESS NOTES
I had the pleasure of seeing Mehdi Martinez for followup in the Liver Clinic at the [facility] on 09/05/2017.  Mr. Martinez returns for followup of alcoholic cirrhosis.  He is status post TIPS for diuretic refractory ascites.      He is doing well at this visit.  His major complaint is that of pain that goes around his chest.  He says it feels at times like it might be a recurrence of his hip fracture.  He also did have 2 days where it went away.  He has been evaluated by his primary physician who did a chest x-ray that did not show any primary pulmonary process.        He denies any abdominal pain, itching or skin rash.  He does have mild fatigue.  He denies any increased abdominal girth or lower extremity edema.  He denies any fevers or chills, cough or shortness of breath.  He denies any nausea or vomiting.  He is having 2 to 3 bowel movements per day on his current dose of lactulose.  His appetite has been good, and his weight has largely remained the same       Current Outpatient Prescriptions   Medication     Pseudoephedrine-APAP  MG TABS     sulfamethoxazole-trimethoprim (BACTRIM DS) 800-160 MG per tablet     aMILoride (MIDAMOR) 5 MG tablet     lactulose (CHRONULAC) 10 GM/15ML solution     rifaximin (XIFAXAN) 550 MG TABS     cetirizine (ZYRTEC) 10 MG tablet     fluticasone (FLONASE) 50 MCG/ACT nasal spray     furosemide (LASIX) 40 MG tablet     zinc sulfate (ZINCATE) 220 MG capsule     metFORMIN (GLUCOPHAGE) 1000 MG tablet     No current facility-administered medications for this visit.      B/P: 151/80, T: Data Unavailable, P: 77, R: Data Unavailable    HEENT exam shows no scleral icterus and no temporal muscle wasting.  His chest is clear.  His cardiac exam reveals no S3, S4, murmur.  There is no friction rub.  His abdominal exam shows no increase in girth.  No masses or tenderness to palpation are present.  His liver is 10-11 cm in span with a slightly prominent left lobe.   Spleen tip is palpable at the left costal margin.  Extremity exam shows no edema.  Skin exam shows no stigmata of chronic liver disease.  Neurologic exam shows no asterixis.       Recent Results (from the past 168 hour(s))   Hepatic panel    Collection Time: 09/05/17 10:19 AM   Result Value Ref Range    Bilirubin Direct 0.4 (H) 0.0 - 0.2 mg/dL    Bilirubin Total 1.3 0.2 - 1.3 mg/dL    Albumin 3.6 3.4 - 5.0 g/dL    Protein Total 6.9 6.8 - 8.8 g/dL    Alkaline Phosphatase 210 (H) 40 - 150 U/L    ALT 43 0 - 70 U/L    AST 66 (H) 0 - 45 U/L   Basic metabolic panel    Collection Time: 09/05/17 10:19 AM   Result Value Ref Range    Sodium 141 133 - 144 mmol/L    Potassium 4.2 3.4 - 5.3 mmol/L    Chloride 109 94 - 109 mmol/L    Carbon Dioxide 25 20 - 32 mmol/L    Anion Gap 7 3 - 14 mmol/L    Glucose 86 70 - 99 mg/dL    Urea Nitrogen 8 7 - 30 mg/dL    Creatinine 0.74 0.66 - 1.25 mg/dL    GFR Estimate >90 >60 mL/min/1.7m2    GFR Estimate If Black >90 >60 mL/min/1.7m2    Calcium 9.2 8.5 - 10.1 mg/dL   CBC with platelets    Collection Time: 09/05/17 10:19 AM   Result Value Ref Range    WBC 17.9 (H) 4.0 - 11.0 10e9/L    RBC Count 4.58 4.4 - 5.9 10e12/L    Hemoglobin 14.1 13.3 - 17.7 g/dL    Hematocrit 42.9 40.0 - 53.0 %    MCV 94 78 - 100 fl    MCH 30.8 26.5 - 33.0 pg    MCHC 32.9 31.5 - 36.5 g/dL    RDW 14.9 10.0 - 15.0 %    Platelet Count 96 (L) 150 - 450 10e9/L   INR    Collection Time: 09/05/17 10:19 AM   Result Value Ref Range    INR 1.16 (H) 0.86 - 1.14      I did review his abdominal ultrasound which shows good TIPS flow velocities, no ascites is seen and he just has a cirrhotic-appearing liver.      My impression is that Mr. Martinez has very well-compensated alcoholic cirrhosis.  He also was treated for hepatitis C and did clear the virus.  As mentioned, he has normal liver function and no ascites and a good TIPS flow velocities.  I will not be making any change to his medical regimen.  I did tell him that he can stop the  Bactrim, as he no longer has ascites.        I did recommend that he talk to his oncologist about the fact that his white count seems to be increasing.  I did look at his differential from [facility], and it was mostly mature lymphocytes consistent with his diagnosis of CLL.      I do not know what this pain is in his chest.  I did recommend he get some spine films to see whether it might be related to spine disease but really have no other suggestions.      My plan will be to see him back in the clinic in 6 months.      Thank you very much for allowing me to participate in the care of this patient.  If you have any questions regarding my recommendations, please do not hesitate to contact me.       Juancho Willard MD      Professor of Medicine  [employer]      Executive Medical Director, Solid Organ Transplant Program  [facility]

## 2017-09-05 NOTE — LETTER
9/5/2017      RE: Mehdi Martinez  [address] [facility] [address]       I had the pleasure of seeing Mehdi Martinez for followup in the Liver Clinic at the [facility] on 09/05/2017.  Mr. Martinez returns for followup of alcoholic cirrhosis.  He is status post TIPS for diuretic refractory ascites.      He is doing well at this visit.  His major complaint is that of pain that goes around his chest.  He says it feels at times like it might be a recurrence of his hip fracture.  He also did have 2 days where it went away.  He has been evaluated by his primary physician who did a chest x-ray that did not show any primary pulmonary process.        He denies any abdominal pain, itching or skin rash.  He does have mild fatigue.  He denies any increased abdominal girth or lower extremity edema.  He denies any fevers or chills, cough or shortness of breath.  He denies any nausea or vomiting.  He is having 2 to 3 bowel movements per day on his current dose of lactulose.  His appetite has been good, and his weight has largely remained the same       Current Outpatient Prescriptions   Medication     Pseudoephedrine-APAP  MG TABS     sulfamethoxazole-trimethoprim (BACTRIM DS) 800-160 MG per tablet     aMILoride (MIDAMOR) 5 MG tablet     lactulose (CHRONULAC) 10 GM/15ML solution     rifaximin (XIFAXAN) 550 MG TABS     cetirizine (ZYRTEC) 10 MG tablet     fluticasone (FLONASE) 50 MCG/ACT nasal spray     furosemide (LASIX) 40 MG tablet     zinc sulfate (ZINCATE) 220 MG capsule     metFORMIN (GLUCOPHAGE) 1000 MG tablet     No current facility-administered medications for this visit.      B/P: 151/80, T: Data Unavailable, P: 77, R: Data Unavailable    HEENT exam shows no scleral icterus and no temporal muscle wasting.  His chest is clear.  His cardiac exam reveals no S3, S4, murmur.  There is no friction rub.  His abdominal exam shows no increase in girth.  No masses or tenderness to palpation  are present.  His liver is 10-11 cm in span with a slightly prominent left lobe.  Spleen tip is palpable at the left costal margin.  Extremity exam shows no edema.  Skin exam shows no stigmata of chronic liver disease.  Neurologic exam shows no asterixis.       Recent Results (from the past 168 hour(s))   Hepatic panel    Collection Time: 09/05/17 10:19 AM   Result Value Ref Range    Bilirubin Direct 0.4 (H) 0.0 - 0.2 mg/dL    Bilirubin Total 1.3 0.2 - 1.3 mg/dL    Albumin 3.6 3.4 - 5.0 g/dL    Protein Total 6.9 6.8 - 8.8 g/dL    Alkaline Phosphatase 210 (H) 40 - 150 U/L    ALT 43 0 - 70 U/L    AST 66 (H) 0 - 45 U/L   Basic metabolic panel    Collection Time: 09/05/17 10:19 AM   Result Value Ref Range    Sodium 141 133 - 144 mmol/L    Potassium 4.2 3.4 - 5.3 mmol/L    Chloride 109 94 - 109 mmol/L    Carbon Dioxide 25 20 - 32 mmol/L    Anion Gap 7 3 - 14 mmol/L    Glucose 86 70 - 99 mg/dL    Urea Nitrogen 8 7 - 30 mg/dL    Creatinine 0.74 0.66 - 1.25 mg/dL    GFR Estimate >90 >60 mL/min/1.7m2    GFR Estimate If Black >90 >60 mL/min/1.7m2    Calcium 9.2 8.5 - 10.1 mg/dL   CBC with platelets    Collection Time: 09/05/17 10:19 AM   Result Value Ref Range    WBC 17.9 (H) 4.0 - 11.0 10e9/L    RBC Count 4.58 4.4 - 5.9 10e12/L    Hemoglobin 14.1 13.3 - 17.7 g/dL    Hematocrit 42.9 40.0 - 53.0 %    MCV 94 78 - 100 fl    MCH 30.8 26.5 - 33.0 pg    MCHC 32.9 31.5 - 36.5 g/dL    RDW 14.9 10.0 - 15.0 %    Platelet Count 96 (L) 150 - 450 10e9/L   INR    Collection Time: 09/05/17 10:19 AM   Result Value Ref Range    INR 1.16 (H) 0.86 - 1.14      I did review his abdominal ultrasound which shows good TIPS flow velocities, no ascites is seen and he just has a cirrhotic-appearing liver.      My impression is that Mr. Martinez has very well-compensated alcoholic cirrhosis.  He also was treated for hepatitis C and did clear the virus.  As mentioned, he has normal liver function and no ascites and a good TIPS flow velocities.  I will not  be making any change to his medical regimen.  I did tell him that he can stop the Bactrim, as he no longer has ascites.        I did recommend that he talk to his oncologist about the fact that his white count seems to be increasing.  I did look at his differential from [facility], and it was mostly mature lymphocytes consistent with his diagnosis of CLL.      I do not know what this pain is in his chest.  I did recommend he get some spine films to see whether it might be related to spine disease but really have no other suggestions.      My plan will be to see him back in the clinic in 6 months.      Thank you very much for allowing me to participate in the care of this patient.  If you have any questions regarding my recommendations, please do not hesitate to contact me.       Juancho Willard MD      Professor of Medicine  [employer]      Executive Medical Director, Solid Organ Transplant Program  [facility]

## 2017-11-13 ENCOUNTER — TELEPHONE (OUTPATIENT)
Dept: GASTROENTEROLOGY | Facility: CLINIC | Age: 62
End: 2017-11-13

## 2017-11-13 DIAGNOSIS — K76.82 HEPATIC ENCEPHALOPATHY (H): ICD-10-CM

## 2017-11-15 ENCOUNTER — DOCUMENTATION ONLY (OUTPATIENT)
Dept: GASTROENTEROLOGY | Facility: CLINIC | Age: 62
End: 2017-11-15

## 2017-11-15 NOTE — PROGRESS NOTES
Fax from [facility] stating Xifaxan PA approved for the following dates: 10/13/17-11/13/2020    Patient may still have some fees to pay.  Some benefit plans have copays, deductibles and coinsurance.     To stay on this medicine after the end date, pt will need to go through the Pa process again.    Letter will be scanned into patient chart.

## 2018-03-20 ENCOUNTER — RADIANT APPOINTMENT (OUTPATIENT)
Dept: ULTRASOUND IMAGING | Facility: CLINIC | Age: 63
End: 2018-03-20
Attending: INTERNAL MEDICINE
Payer: COMMERCIAL

## 2018-03-20 ENCOUNTER — OFFICE VISIT (OUTPATIENT)
Dept: GASTROENTEROLOGY | Facility: CLINIC | Age: 63
End: 2018-03-20
Attending: INTERNAL MEDICINE
Payer: COMMERCIAL

## 2018-03-20 VITALS
DIASTOLIC BLOOD PRESSURE: 64 MMHG | SYSTOLIC BLOOD PRESSURE: 139 MMHG | HEIGHT: 71 IN | WEIGHT: 187.6 LBS | HEART RATE: 84 BPM | BODY MASS INDEX: 26.26 KG/M2 | TEMPERATURE: 98.4 F | OXYGEN SATURATION: 96 %

## 2018-03-20 DIAGNOSIS — K70.30 ALCOHOLIC CIRRHOSIS OF LIVER WITHOUT ASCITES (H): ICD-10-CM

## 2018-03-20 DIAGNOSIS — K74.60 CIRRHOSIS OF LIVER WITHOUT ASCITES, UNSPECIFIED HEPATIC CIRRHOSIS TYPE (H): Primary | ICD-10-CM

## 2018-03-20 DIAGNOSIS — Z95.828 S/P TIPS (TRANSJUGULAR INTRAHEPATIC PORTOSYSTEMIC SHUNT): ICD-10-CM

## 2018-03-20 LAB
AFP SERPL-MCNC: 5 UG/L (ref 0–8)
ALBUMIN SERPL-MCNC: 3.3 G/DL (ref 3.4–5)
ALP SERPL-CCNC: 196 U/L (ref 40–150)
ALT SERPL W P-5'-P-CCNC: 25 U/L (ref 0–70)
ANION GAP SERPL CALCULATED.3IONS-SCNC: 8 MMOL/L (ref 3–14)
AST SERPL W P-5'-P-CCNC: 46 U/L (ref 0–45)
BILIRUB DIRECT SERPL-MCNC: 0.3 MG/DL (ref 0–0.2)
BILIRUB SERPL-MCNC: 0.8 MG/DL (ref 0.2–1.3)
BUN SERPL-MCNC: 10 MG/DL (ref 7–30)
CALCIUM SERPL-MCNC: 9.2 MG/DL (ref 8.5–10.1)
CHLORIDE SERPL-SCNC: 110 MMOL/L (ref 94–109)
CO2 SERPL-SCNC: 24 MMOL/L (ref 20–32)
CREAT SERPL-MCNC: 0.66 MG/DL (ref 0.66–1.25)
ERYTHROCYTE [DISTWIDTH] IN BLOOD BY AUTOMATED COUNT: 13.9 % (ref 10–15)
GFR SERPL CREATININE-BSD FRML MDRD: >90 ML/MIN/1.7M2
GLUCOSE SERPL-MCNC: 104 MG/DL (ref 70–99)
HCT VFR BLD AUTO: 38.8 % (ref 40–53)
HGB BLD-MCNC: 12.9 G/DL (ref 13.3–17.7)
INR PPP: 1.26 (ref 0.86–1.14)
MCH RBC QN AUTO: 29.8 PG (ref 26.5–33)
MCHC RBC AUTO-ENTMCNC: 33.2 G/DL (ref 31.5–36.5)
MCV RBC AUTO: 90 FL (ref 78–100)
PLATELET # BLD AUTO: 113 10E9/L (ref 150–450)
POTASSIUM SERPL-SCNC: 4.3 MMOL/L (ref 3.4–5.3)
PROT SERPL-MCNC: 6.2 G/DL (ref 6.8–8.8)
RBC # BLD AUTO: 4.33 10E12/L (ref 4.4–5.9)
SODIUM SERPL-SCNC: 142 MMOL/L (ref 133–144)
WBC # BLD AUTO: 18.1 10E9/L (ref 4–11)

## 2018-03-20 PROCEDURE — 80048 BASIC METABOLIC PNL TOTAL CA: CPT | Performed by: INTERNAL MEDICINE

## 2018-03-20 PROCEDURE — 85027 COMPLETE CBC AUTOMATED: CPT | Performed by: INTERNAL MEDICINE

## 2018-03-20 PROCEDURE — 82105 ALPHA-FETOPROTEIN SERUM: CPT | Performed by: INTERNAL MEDICINE

## 2018-03-20 PROCEDURE — 80076 HEPATIC FUNCTION PANEL: CPT | Performed by: INTERNAL MEDICINE

## 2018-03-20 PROCEDURE — 36415 COLL VENOUS BLD VENIPUNCTURE: CPT | Performed by: INTERNAL MEDICINE

## 2018-03-20 PROCEDURE — G0463 HOSPITAL OUTPT CLINIC VISIT: HCPCS | Mod: ZF

## 2018-03-20 PROCEDURE — 85610 PROTHROMBIN TIME: CPT | Performed by: INTERNAL MEDICINE

## 2018-03-20 ASSESSMENT — PAIN SCALES - GENERAL: PAINLEVEL: NO PAIN (0)

## 2018-03-20 NOTE — MR AVS SNAPSHOT
After Visit Summary   3/20/2018    Mehdi Martinez    MRN: [id number]           Patient Information     Date Of Birth          1955        Visit Information        Provider Department      3/20/2018 10:45 AM Juancho Willard MD [facility] Hepatology        Today's Diagnoses     Cirrhosis of liver without ascites, unspecified hepatic cirrhosis type (H)    -  1       Follow-ups after your visit        Follow-up notes from your care team     Return in about 6 months (around 9/20/2018).      Your next 10 appointments already scheduled     Sep 18, 2018  8:00 AM CDT   Lab with  LAB   [facility] Lab ([facility])    [address]  [address] [facility] [address] [phone]            Sep 18, 2018  8:30 AM CDT   US ABDOMEN COMPLETE with [address] [place] [address] Imaging Center US ([facility])    [address] [place] [address] [phone]           Please bring a list of your medicines (including vitamins, minerals and over-the-counter drugs). Also, tell your doctor about any allergies you may have. Wear comfortable clothes and leave your valuables at home.  Adults: No eating or drinking for 8 hours before the exam. You may take medicine with a small sip of water.  Children: - Children 6+ years: No food or drink for 6 hours before exam. - Children 1-5 years: No food or drink for 4 hours before exam. - Infants, breast-fed: may have breast milk up to 2 hours before exam. - Infants, formula: may have bottle until 4 hours before exam.  Please call the Imaging Department at your exam site with any questions.            Sep 18, 2018  9:30 AM CDT   (Arrive by 9:15 AM)   Return General Liver with Juancho Willard MD   [facility] Hepatology ([facility])    [address]  [address]  [facility] [address] [phone]              Future tests that were ordered for you today     Open Standing Orders      "   Priority Remaining Interval Expires Ordered    US abdomen complete [CGV284] Routine 2/2 Every 6 Months 3/20/2019 3/20/2018          Open Future Orders        Priority Expected Expires Ordered    US Abdomen Complete Routine  3/20/2019 3/20/2018    Hepatic Panel [LAB20] Routine 9/16/2018 3/20/2019 3/20/2018    Basic metabolic panel [LAB15] Routine 9/16/2018 3/20/2019 3/20/2018    CBC with platelets [ABG799] Routine 9/16/2018 3/20/2019 3/20/2018    INR [AAG3087] Routine 9/16/2018 3/20/2019 3/20/2018    AFP tumor marker [CMW137] Routine 9/16/2018 3/20/2019 3/20/2018            Who to contact     If you have questions or need follow up information about today's clinic visit or your schedule please contact [facility] HEPATOLOGY directly at [phone].  Normal or non-critical lab and imaging results will be communicated to you by [employer], letter or phone within 4 business days after the clinic has received the results. If you do not hear from us within 7 days, please contact the clinic through [employer] or phone. If you have a critical or abnormal lab result, we will notify you by phone as soon as possible.  Submit refill requests through [employer] or call your pharmacy and they will forward the refill request to us. Please allow 3 business days for your refill to be completed.          Additional Information About Your Visit        [employer] Information     [employer] lets you send messages to your doctor, view your test results, renew your prescriptions, schedule appointments and more. To sign up, go to [email or web address] . Click on \"Log in\" on the left side of the screen, which will take you to the Welcome page. Then click on \"Sign up Now\" on the right side of the page.     You will be asked to enter the access code listed below, as well as some personal information. Please follow the directions to create your username and password.     Your access code is: [email or web address]  Expires: 6/18/2018 11:03 AM     Your access code " "will  in 90 days. If you need help or a new code, please call your [place] clinic or [phone].        Care EveryWhere ID     This is your Care EveryWhere ID. This could be used by other organizations to access your [place] medical records  [id number]        Your Vitals Were     Pulse Temperature Height Pulse Oximetry BMI (Body Mass Index)       84 98.4  F (36.9  C) (Oral) 1.803 m (5' 11\") 96% 26.16 kg/m2        Blood Pressure from Last 3 Encounters:   18 139/64   17 151/80   17 151/78    Weight from Last 3 Encounters:   18 85.1 kg (187 lb 9.6 oz)   17 79.8 kg (176 lb)   17 78.2 kg (172 lb 8 oz)              We Performed the Following     Schedule follow up appointments          Today's Medication Changes          These changes are accurate as of 3/20/18 11:03 AM.  If you have any questions, ask your nurse or doctor.               These medicines have changed or have updated prescriptions.        Dose/Directions    furosemide 40 MG tablet   Commonly known as:  LASIX   This may have changed:  how much to take   Used for:  Alcoholic cirrhosis of liver with ascites (H)        Dose:  80 mg   Take 2 tablets (80 mg) by mouth daily   Quantity:  60 tablet   Refills:  3                Primary Care Provider Office Phone # Fax #    David Heller [phone]       [facility] [address] [facility] [address]        Equal Access to Services     MARIAN CHAMBERLAIN AH: Hadii sebas ku hadasho Soomaali, waaxda luqadaha, qaybta kaalmada adeegyada, waxay stefano card. So [facility] [phone].    ATENCIÓN: Si habla español, tiene a marc disposición servicios gratuitos de asistencia lingüística. Llame al [phone].    We comply with applicable federal civil rights laws and [place] laws. We do not discriminate on the basis of race, color, national origin, age, disability, sex, sexual orientation, or gender identity.            Thank you!     Thank " you for choosing [facility] HEPATOLOGY  for your care. Our goal is always to provide you with excellent care. Hearing back from our patients is one way we can continue to improve our services. Please take a few minutes to complete the written survey that you may receive in the mail after your visit with us. Thank you!             Your Updated Medication List - Protect others around you: Learn how to safely use, store and throw away your medicines at www.disposemymeds.org.          This list is accurate as of 3/20/18 11:03 AM.  Always use your most recent med list.                   Brand Name Dispense Instructions for use Diagnosis    aMILoride 5 MG tablet    MIDAMOR    180 tablet    Take 2 tablets (10 mg) by mouth daily    Cirrhosis of liver without ascites, unspecified hepatic cirrhosis type (H)       cetirizine 10 MG tablet    zyrTEC     Take 10 mg by mouth Reported on 3/6/2017        fluticasone 50 MCG/ACT spray    FLONASE     2 sprays Reported on 3/6/2017        furosemide 40 MG tablet    LASIX    60 tablet    Take 2 tablets (80 mg) by mouth daily    Alcoholic cirrhosis of liver with ascites (H)       lactulose 10 GM/15ML solution    CHRONULAC    8100 mL    Take 30 mLs (20 g) by mouth 3 times daily Ensure you have 3 loose BMs/day.    Hepatic encephalopathy (H)       metFORMIN 1000 MG tablet    GLUCOPHAGE     Take 1,000 mg by mouth daily (with dinner)        Pseudoephedrine-APAP  MG Tabs      Take 300 mg by mouth daily        rifaximin 550 MG Tabs tablet    XIFAXAN    60 tablet    Take 1 tablet (550 mg) by mouth 2 times daily    Hepatic encephalopathy (H)       sulfamethoxazole-trimethoprim 800-160 MG per tablet    BACTRIM DS    90 tablet    Take 1 tablet daily.    SBP (spontaneous bacterial peritonitis) (H)       zinc sulfate 220 (50 ZN) MG capsule    ZINCATE     Take 50 mg by mouth daily

## 2018-03-20 NOTE — LETTER
3/20/2018      RE: Mehdi Martinez  [address] [facility] [address]       I had the pleasure of seeing Mehdi Martinez for followup in the Liver Clinic at the [facility] on 03/20/2018.  Mr. 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      He is doing well at this visit.  He denies any abdominal pain, itching or skin rash but still does complain of some fatigue.  He had asked whether it is related to his liver disease or his CLL, and I think it is difficult to say.      He denies any fevers or chills, cough or shortness of breath.  He denies any nausea, vomiting, diarrhea or constipation.  His appetite has been good, and his weight is actually down a few pounds intentionally.  There have not been any new events since he was last seen.     Current Outpatient Prescriptions   Medication     rifaximin (XIFAXAN) 550 MG TABS tablet     sulfamethoxazole-trimethoprim (BACTRIM DS) 800-160 MG per tablet     furosemide (LASIX) 40 MG tablet     zinc sulfate (ZINCATE) 220 MG capsule     metFORMIN (GLUCOPHAGE) 1000 MG tablet     Pseudoephedrine-APAP  MG TABS     aMILoride (MIDAMOR) 5 MG tablet     lactulose (CHRONULAC) 10 GM/15ML solution     cetirizine (ZYRTEC) 10 MG tablet     fluticasone (FLONASE) 50 MCG/ACT nasal spray     No current facility-administered medications for this visit.      B/P: 139/64, T: 98.4, P: 84, R: Data Unavailable    HEENT exam shows no scleral icterus and no temporal muscle wasting.  His chest is clear.  His abdominal exam shows no increase in girth.  No masses or tenderness to palpation are present.  His liver is 10-11 cm in span with a slightly prominent left lobe.  No spleen tip is palpable, and extremity exam shows no edema.  Skin exam shows no stigmata of chronic liver disease.  Neurologic exam shows no asterixis.     Recent Results (from the past 168 hour(s))   Hepatic Panel  [LAB20]    Collection Time: 03/20/18  9:42 AM   Result Value Ref Range    Bilirubin Direct 0.3 (H) 0.0 - 0.2 mg/dL    Bilirubin Total 0.8 0.2 - 1.3 mg/dL    Albumin 3.3 (L) 3.4 - 5.0 g/dL    Protein Total 6.2 (L) 6.8 - 8.8 g/dL    Alkaline Phosphatase 196 (H) 40 - 150 U/L    ALT 25 0 - 70 U/L    AST 46 (H) 0 - 45 U/L   Basic metabolic panel [LAB15]    Collection Time: 03/20/18  9:42 AM   Result Value Ref Range    Sodium 142 133 - 144 mmol/L    Potassium 4.3 3.4 - 5.3 mmol/L    Chloride 110 (H) 94 - 109 mmol/L    Carbon Dioxide 24 20 - 32 mmol/L    Anion Gap 8 3 - 14 mmol/L    Glucose 104 (H) 70 - 99 mg/dL    Urea Nitrogen 10 7 - 30 mg/dL    Creatinine 0.66 0.66 - 1.25 mg/dL    GFR Estimate >90 >60 mL/min/1.7m2    GFR Estimate If Black >90 >60 mL/min/1.7m2    Calcium 9.2 8.5 - 10.1 mg/dL   CBC with platelets [TEM211]    Collection Time: 03/20/18  9:42 AM   Result Value Ref Range    WBC 18.1 (H) 4.0 - 11.0 10e9/L    RBC Count 4.33 (L) 4.4 - 5.9 10e12/L    Hemoglobin 12.9 (L) 13.3 - 17.7 g/dL    Hematocrit 38.8 (L) 40.0 - 53.0 %    MCV 90 78 - 100 fl    MCH 29.8 26.5 - 33.0 pg    MCHC 33.2 31.5 - 36.5 g/dL    RDW 13.9 10.0 - 15.0 %    Platelet Count 113 (L) 150 - 450 10e9/L   INR [VEC0435]    Collection Time: 03/20/18  9:42 AM   Result Value Ref Range    INR 1.26 (H) 0.86 - 1.14      I did review his ultrasound which shows no mass lesions in the liver.  His TIPS flow velocities are within our expected range, and there is no ascites.      My impression is that Mr. Martinez is status post TIPS for diuretic refractory ascites.  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  All in all, he is doing extremely well, and I will not be making any change to his liver regimen.  He is up-to-date with regard to vaccines and cancer screening.      The one thing I am a bit concerned about is his CLL.  His white count is gradually  increasing.  [facility]  Thus far, he has simply been on observation.      Thank you very much for allowing me to participate in the care of this patient.  If you have any questions regarding my recommendations, please do not hesitate to contact me.       Juancho Willard MD      Professor of Medicine  [employer]      Executive Medical Director, Solid Organ Transplant Program  [facility]

## 2018-03-20 NOTE — NURSING NOTE
"Chief Complaint   Patient presents with     RECHECK     6 mo f/u      Hospital F/U     hip replacement        Initial /84  Pulse 84  Temp 98.4  F (36.9  C) (Oral)  Ht 1.803 m (5' 11\")  Wt 85.1 kg (187 lb 9.6 oz)  SpO2 96%  BMI 26.16 kg/m2 Estimated body mass index is 26.16 kg/(m^2) as calculated from the following:    Height as of this encounter: 1.803 m (5' 11\").    Weight as of this encounter: 85.1 kg (187 lb 9.6 oz).  Medication Reconciliation: complete   Herberth Bradley, KYAW      "

## 2018-03-20 NOTE — PROGRESS NOTES
I had the pleasure of seeing Mehdi Martinez for followup in the Liver Clinic at the [facility] on 03/20/2018.  Mr. 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      He is doing well at this visit.  He denies any abdominal pain, itching or skin rash but still does complain of some fatigue.  He had asked whether it is related to his liver disease or his CLL, and I think it is difficult to say.      He denies any fevers or chills, cough or shortness of breath.  He denies any nausea, vomiting, diarrhea or constipation.  His appetite has been good, and his weight is actually down a few pounds intentionally.  There have not been any new events since he was last seen.     Current Outpatient Prescriptions   Medication     rifaximin (XIFAXAN) 550 MG TABS tablet     sulfamethoxazole-trimethoprim (BACTRIM DS) 800-160 MG per tablet     furosemide (LASIX) 40 MG tablet     zinc sulfate (ZINCATE) 220 MG capsule     metFORMIN (GLUCOPHAGE) 1000 MG tablet     Pseudoephedrine-APAP  MG TABS     aMILoride (MIDAMOR) 5 MG tablet     lactulose (CHRONULAC) 10 GM/15ML solution     cetirizine (ZYRTEC) 10 MG tablet     fluticasone (FLONASE) 50 MCG/ACT nasal spray     No current facility-administered medications for this visit.      B/P: 139/64, T: 98.4, P: 84, R: Data Unavailable    HEENT exam shows no scleral icterus and no temporal muscle wasting.  His chest is clear.  His abdominal exam shows no increase in girth.  No masses or tenderness to palpation are present.  His liver is 10-11 cm in span with a slightly prominent left lobe.  No spleen tip is palpable, and extremity exam shows no edema.  Skin exam shows no stigmata of chronic liver disease.  Neurologic exam shows no asterixis.     Recent Results (from the past 168 hour(s))   Hepatic Panel [LAB20]    Collection Time: 03/20/18  9:42 AM   Result Value Ref Range    Bilirubin  Direct 0.3 (H) 0.0 - 0.2 mg/dL    Bilirubin Total 0.8 0.2 - 1.3 mg/dL    Albumin 3.3 (L) 3.4 - 5.0 g/dL    Protein Total 6.2 (L) 6.8 - 8.8 g/dL    Alkaline Phosphatase 196 (H) 40 - 150 U/L    ALT 25 0 - 70 U/L    AST 46 (H) 0 - 45 U/L   Basic metabolic panel [LAB15]    Collection Time: 03/20/18  9:42 AM   Result Value Ref Range    Sodium 142 133 - 144 mmol/L    Potassium 4.3 3.4 - 5.3 mmol/L    Chloride 110 (H) 94 - 109 mmol/L    Carbon Dioxide 24 20 - 32 mmol/L    Anion Gap 8 3 - 14 mmol/L    Glucose 104 (H) 70 - 99 mg/dL    Urea Nitrogen 10 7 - 30 mg/dL    Creatinine 0.66 0.66 - 1.25 mg/dL    GFR Estimate >90 >60 mL/min/1.7m2    GFR Estimate If Black >90 >60 mL/min/1.7m2    Calcium 9.2 8.5 - 10.1 mg/dL   CBC with platelets [HKL384]    Collection Time: 03/20/18  9:42 AM   Result Value Ref Range    WBC 18.1 (H) 4.0 - 11.0 10e9/L    RBC Count 4.33 (L) 4.4 - 5.9 10e12/L    Hemoglobin 12.9 (L) 13.3 - 17.7 g/dL    Hematocrit 38.8 (L) 40.0 - 53.0 %    MCV 90 78 - 100 fl    MCH 29.8 26.5 - 33.0 pg    MCHC 33.2 31.5 - 36.5 g/dL    RDW 13.9 10.0 - 15.0 %    Platelet Count 113 (L) 150 - 450 10e9/L   INR [[id number]]    Collection Time: 03/20/18  9:42 AM   Result Value Ref Range    INR 1.26 (H) 0.86 - 1.14      I did review his ultrasound which shows no mass lesions in the liver.  His TIPS flow velocities are within our expected range, and there is no ascites.      My impression is that Mr. Martinez is status post TIPS for diuretic refractory ascites.  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  All in all, he is doing extremely well, and I will not be making any change to his liver regimen.  He is up-to-date with regard to vaccines and cancer screening.      The one thing I am a bit concerned about is his CLL.  His white count is gradually increasing.  He will be seeing his hematologist/oncologist at [facility] in June.   Thus far, he has simply been on observation.      Thank you very much for allowing me to participate in the care of this patient.  If you have any questions regarding my recommendations, please do not hesitate to contact me.       Juancho Willard MD      Professor of Medicine  [employer]      Executive Medical Director, Solid Organ Transplant Program  [facility]

## 2018-03-28 DIAGNOSIS — K74.60 CIRRHOSIS OF LIVER WITHOUT ASCITES, UNSPECIFIED HEPATIC CIRRHOSIS TYPE (H): ICD-10-CM

## 2018-03-28 RX ORDER — AMILORIDE HYDROCHLORIDE 5 MG/1
10 TABLET ORAL DAILY
Qty: 180 TABLET | Refills: 3 | Status: SHIPPED | OUTPATIENT
Start: 2018-03-28 | End: 2022-01-19

## 2018-09-20 ENCOUNTER — RADIANT APPOINTMENT (OUTPATIENT)
Dept: ULTRASOUND IMAGING | Facility: CLINIC | Age: 63
End: 2018-09-20
Attending: INTERNAL MEDICINE
Payer: COMMERCIAL

## 2018-09-20 ENCOUNTER — OFFICE VISIT (OUTPATIENT)
Dept: GASTROENTEROLOGY | Facility: CLINIC | Age: 63
End: 2018-09-20
Attending: INTERNAL MEDICINE
Payer: COMMERCIAL

## 2018-09-20 VITALS
OXYGEN SATURATION: 94 % | DIASTOLIC BLOOD PRESSURE: 66 MMHG | WEIGHT: 181 LBS | HEART RATE: 82 BPM | BODY MASS INDEX: 25.34 KG/M2 | SYSTOLIC BLOOD PRESSURE: 132 MMHG | TEMPERATURE: 97.8 F | HEIGHT: 71 IN

## 2018-09-20 DIAGNOSIS — K74.60 CIRRHOSIS OF LIVER WITHOUT ASCITES, UNSPECIFIED HEPATIC CIRRHOSIS TYPE (H): ICD-10-CM

## 2018-09-20 DIAGNOSIS — Z95.828 S/P TIPS (TRANSJUGULAR INTRAHEPATIC PORTOSYSTEMIC SHUNT): ICD-10-CM

## 2018-09-20 DIAGNOSIS — K74.60 CIRRHOSIS OF LIVER WITHOUT ASCITES, UNSPECIFIED HEPATIC CIRRHOSIS TYPE (H): Primary | ICD-10-CM

## 2018-09-20 LAB
AFP SERPL-MCNC: 1.7 UG/L (ref 0–8)
ALBUMIN SERPL-MCNC: 3.4 G/DL (ref 3.4–5)
ALP SERPL-CCNC: 244 U/L (ref 40–150)
ALT SERPL W P-5'-P-CCNC: 26 U/L (ref 0–70)
ANION GAP SERPL CALCULATED.3IONS-SCNC: 5 MMOL/L (ref 3–14)
AST SERPL W P-5'-P-CCNC: 39 U/L (ref 0–45)
BILIRUB DIRECT SERPL-MCNC: 0.4 MG/DL (ref 0–0.2)
BILIRUB SERPL-MCNC: 0.9 MG/DL (ref 0.2–1.3)
BUN SERPL-MCNC: 6 MG/DL (ref 7–30)
CALCIUM SERPL-MCNC: 9.3 MG/DL (ref 8.5–10.1)
CHLORIDE SERPL-SCNC: 110 MMOL/L (ref 94–109)
CO2 SERPL-SCNC: 25 MMOL/L (ref 20–32)
CREAT SERPL-MCNC: 0.68 MG/DL (ref 0.66–1.25)
ERYTHROCYTE [DISTWIDTH] IN BLOOD BY AUTOMATED COUNT: 15.2 % (ref 10–15)
GFR SERPL CREATININE-BSD FRML MDRD: >90 ML/MIN/1.7M2
GLUCOSE SERPL-MCNC: 108 MG/DL (ref 70–99)
HCT VFR BLD AUTO: 44 % (ref 40–53)
HGB BLD-MCNC: 14.4 G/DL (ref 13.3–17.7)
INR PPP: 2.11 (ref 0.86–1.14)
MCH RBC QN AUTO: 28.7 PG (ref 26.5–33)
MCHC RBC AUTO-ENTMCNC: 32.7 G/DL (ref 31.5–36.5)
MCV RBC AUTO: 88 FL (ref 78–100)
PLATELET # BLD AUTO: 141 10E9/L (ref 150–450)
POTASSIUM SERPL-SCNC: 4.2 MMOL/L (ref 3.4–5.3)
PROT SERPL-MCNC: 6.8 G/DL (ref 6.8–8.8)
RBC # BLD AUTO: 5.01 10E12/L (ref 4.4–5.9)
SODIUM SERPL-SCNC: 140 MMOL/L (ref 133–144)
WBC # BLD AUTO: 20.5 10E9/L (ref 4–11)

## 2018-09-20 PROCEDURE — 85027 COMPLETE CBC AUTOMATED: CPT | Performed by: INTERNAL MEDICINE

## 2018-09-20 PROCEDURE — 80076 HEPATIC FUNCTION PANEL: CPT | Performed by: INTERNAL MEDICINE

## 2018-09-20 PROCEDURE — G0463 HOSPITAL OUTPT CLINIC VISIT: HCPCS

## 2018-09-20 PROCEDURE — 85610 PROTHROMBIN TIME: CPT | Performed by: INTERNAL MEDICINE

## 2018-09-20 PROCEDURE — 36415 COLL VENOUS BLD VENIPUNCTURE: CPT | Performed by: INTERNAL MEDICINE

## 2018-09-20 PROCEDURE — 80048 BASIC METABOLIC PNL TOTAL CA: CPT | Performed by: INTERNAL MEDICINE

## 2018-09-20 PROCEDURE — 82105 ALPHA-FETOPROTEIN SERUM: CPT | Performed by: INTERNAL MEDICINE

## 2018-09-20 ASSESSMENT — PAIN SCALES - GENERAL: PAINLEVEL: MILD PAIN (3)

## 2018-09-20 NOTE — LETTER
9/20/2018      RE: Mehdi Martinez  [address] [facility] [address]       I had the pleasure of seeing Mehdi Martinez for followup in the Liver Clinic at the [facility] on 09/20/2018.  Mr. 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  He is a sustained virologic responder to hepatitis C treatment.      For the most part, he is doing fairly well.  He now has had 1 of his hip replaced, and he is doing quite well at this point in time.  He denies any abdominal pain, itching or skin rash.  He has mild fatigue.  He denies any increased abdominal girth or lower extremity edema.  He denies any fevers or chills, cough or shortness of breath.  He denies any nausea or vomiting, diarrhea or constipation.  He has not had any gastrointestinal bleeding or any overt signs of hepatic encephalopathy.  Although his appetite has been good, his weight is actually down about 5 pounds.     Current Outpatient Prescriptions   Medication     aMILoride (MIDAMOR) 5 MG tablet     cetirizine (ZYRTEC) 10 MG tablet     fluticasone (FLONASE) 50 MCG/ACT nasal spray     furosemide (LASIX) 40 MG tablet     metFORMIN (GLUCOPHAGE) 1000 MG tablet     rifaximin (XIFAXAN) 550 MG TABS tablet     Rivaroxaban (XARELTO PO)     zinc sulfate (ZINCATE) 220 MG capsule     lactulose (CHRONULAC) 10 GM/15ML solution     Pseudoephedrine-APAP  MG TABS     sulfamethoxazole-trimethoprim (BACTRIM DS) 800-160 MG per tablet     No current facility-administered medications for this visit.      B/P: 132/66, T: 97.8, P: 82, R: Data Unavailable    In general, he looks quite well.  HEENT exam shows no scleral icterus or temporal muscle wasting.  His chest is clear.  His abdominal exam shows no increase in girth.  No masses or tenderness to palpation are present.  His liver is 10 cm in span without left lobe enlargement.  No spleen tip is palpable, and extremity exam shows no edema.  Skin exam shows no stigmata  of chronic liver disease.  Neurologic exam shows no asterixis.     Recent Results (from the past 168 hour(s))   Hepatic Panel [LAB20]    Collection Time: 09/20/18  9:10 AM   Result Value Ref Range    Bilirubin Direct 0.4 (H) 0.0 - 0.2 mg/dL    Bilirubin Total 0.9 0.2 - 1.3 mg/dL    Albumin 3.4 3.4 - 5.0 g/dL    Protein Total 6.8 6.8 - 8.8 g/dL    Alkaline Phosphatase 244 (H) 40 - 150 U/L    ALT 26 0 - 70 U/L    AST 39 0 - 45 U/L   Basic metabolic panel [LAB15]    Collection Time: 09/20/18  9:10 AM   Result Value Ref Range    Sodium 140 133 - 144 mmol/L    Potassium 4.2 3.4 - 5.3 mmol/L    Chloride 110 (H) 94 - 109 mmol/L    Carbon Dioxide 25 20 - 32 mmol/L    Anion Gap 5 3 - 14 mmol/L    Glucose 108 (H) 70 - 99 mg/dL    Urea Nitrogen 6 (L) 7 - 30 mg/dL    Creatinine 0.68 0.66 - 1.25 mg/dL    GFR Estimate >90 >60 mL/min/1.7m2    GFR Estimate If Black >90 >60 mL/min/1.7m2    Calcium 9.3 8.5 - 10.1 mg/dL   CBC with platelets [EZQ880]    Collection Time: 09/20/18  9:10 AM   Result Value Ref Range    WBC 20.5 (H) 4.0 - 11.0 10e9/L    RBC Count 5.01 4.4 - 5.9 10e12/L    Hemoglobin 14.4 13.3 - 17.7 g/dL    Hematocrit 44.0 40.0 - 53.0 %    MCV 88 78 - 100 fl    MCH 28.7 26.5 - 33.0 pg    MCHC 32.7 31.5 - 36.5 g/dL    RDW 15.2 (H) 10.0 - 15.0 %    Platelet Count 141 (L) 150 - 450 10e9/L   INR [[id number]]    Collection Time: 09/20/18  9:10 AM   Result Value Ref Range    INR 2.11 (H) 0.86 - 1.14      My impression is that Mr. Martinez is doing well.  He is status post TIPS, and his TIPS flow velocities look excellent.  He has no mass lesions in his liver.  The only thing of note is that his white count is up.  I have encouraged him to see his hematologist at [facility] in the near future about that.  Otherwise, all other complications are well addressed, and I will see him back in the clinic again in 6 months with repeat ultrasound and blood work.  He is up-to-date with regard to vaccines.      Thank you very much for  allowing me to participate in the care of this patient.  If you have any questions regarding my recommendations, please do not hesitate to contact me.       Juancho Willard MD      Professor of Medicine  [employer]      Executive Medical Director, Solid Organ Transplant Program  [facility]

## 2018-09-20 NOTE — NURSING NOTE
"Chief Complaint   Patient presents with     RECHECK     6 month f/u cirrhosis     /66  Pulse 82  Temp 97.8  F (36.6  C) (Oral)  Ht 1.803 m (5' 11\")  Wt 82.1 kg (181 lb)  SpO2 94%  BMI 25.24 kg/m2  Ginger Carter    "

## 2018-09-20 NOTE — PROGRESS NOTES
I had the pleasure of seeing Mehdi Martinez for followup in the Liver Clinic at the [facility] on 09/20/2018.  Mr. 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  He is a sustained virologic responder to hepatitis C treatment.      For the most part, he is doing fairly well.  He now has had 1 of his hip replaced, and he is doing quite well at this point in time.  He denies any abdominal pain, itching or skin rash.  He has mild fatigue.  He denies any increased abdominal girth or lower extremity edema.  He denies any fevers or chills, cough or shortness of breath.  He denies any nausea or vomiting, diarrhea or constipation.  He has not had any gastrointestinal bleeding or any overt signs of hepatic encephalopathy.  Although his appetite has been good, his weight is actually down about 5 pounds.     Current Outpatient Prescriptions   Medication     aMILoride (MIDAMOR) 5 MG tablet     cetirizine (ZYRTEC) 10 MG tablet     fluticasone (FLONASE) 50 MCG/ACT nasal spray     furosemide (LASIX) 40 MG tablet     metFORMIN (GLUCOPHAGE) 1000 MG tablet     rifaximin (XIFAXAN) 550 MG TABS tablet     Rivaroxaban (XARELTO PO)     zinc sulfate (ZINCATE) 220 MG capsule     lactulose (CHRONULAC) 10 GM/15ML solution     Pseudoephedrine-APAP  MG TABS     sulfamethoxazole-trimethoprim (BACTRIM DS) 800-160 MG per tablet     No current facility-administered medications for this visit.      B/P: 132/66, T: 97.8, P: 82, R: Data Unavailable    In general, he looks quite well.  HEENT exam shows no scleral icterus or temporal muscle wasting.  His chest is clear.  His abdominal exam shows no increase in girth.  No masses or tenderness to palpation are present.  His liver is 10 cm in span without left lobe enlargement.  No spleen tip is palpable, and extremity exam shows no edema.  Skin exam shows no stigmata of chronic liver disease.  Neurologic exam shows no asterixis.     Recent  Results (from the past 168 hour(s))   Hepatic Panel [LAB20]    Collection Time: 09/20/18  9:10 AM   Result Value Ref Range    Bilirubin Direct 0.4 (H) 0.0 - 0.2 mg/dL    Bilirubin Total 0.9 0.2 - 1.3 mg/dL    Albumin 3.4 3.4 - 5.0 g/dL    Protein Total 6.8 6.8 - 8.8 g/dL    Alkaline Phosphatase 244 (H) 40 - 150 U/L    ALT 26 0 - 70 U/L    AST 39 0 - 45 U/L   Basic metabolic panel [LAB15]    Collection Time: 09/20/18  9:10 AM   Result Value Ref Range    Sodium 140 133 - 144 mmol/L    Potassium 4.2 3.4 - 5.3 mmol/L    Chloride 110 (H) 94 - 109 mmol/L    Carbon Dioxide 25 20 - 32 mmol/L    Anion Gap 5 3 - 14 mmol/L    Glucose 108 (H) 70 - 99 mg/dL    Urea Nitrogen 6 (L) 7 - 30 mg/dL    Creatinine 0.68 0.66 - 1.25 mg/dL    GFR Estimate >90 >60 mL/min/1.7m2    GFR Estimate If Black >90 >60 mL/min/1.7m2    Calcium 9.3 8.5 - 10.1 mg/dL   CBC with platelets [ZGM560]    Collection Time: 09/20/18  9:10 AM   Result Value Ref Range    WBC 20.5 (H) 4.0 - 11.0 10e9/L    RBC Count 5.01 4.4 - 5.9 10e12/L    Hemoglobin 14.4 13.3 - 17.7 g/dL    Hematocrit 44.0 40.0 - 53.0 %    MCV 88 78 - 100 fl    MCH 28.7 26.5 - 33.0 pg    MCHC 32.7 31.5 - 36.5 g/dL    RDW 15.2 (H) 10.0 - 15.0 %    Platelet Count 141 (L) 150 - 450 10e9/L   INR [[id number]]    Collection Time: 09/20/18  9:10 AM   Result Value Ref Range    INR 2.11 (H) 0.86 - 1.14      My impression is that Mr. Martinez is doing well.  He is status post TIPS, and his TIPS flow velocities look excellent.  He has no mass lesions in his liver.  The only thing of note is that his white count is up.  I have encouraged him to see his hematologist at [facility] in the near future about that.  Otherwise, all other complications are well addressed, and I will see him back in the clinic again in 6 months with repeat ultrasound and blood work.  He is up-to-date with regard to vaccines.      Thank you very much for allowing me to participate in the care of this patient.  If you have any  questions regarding my recommendations, please do not hesitate to contact me.       Juancho Willard MD      Professor of Medicine  [employer]      Executive Medical Director, Solid Organ Transplant Program  [facility] [place]

## 2018-09-20 NOTE — MR AVS SNAPSHOT
After Visit Summary   9/20/2018    Mehdi Martinez    MRN: [id number]           Patient Information     Date Of Birth          1955        Visit Information        Provider Department      9/20/2018 10:00 AM Juancho Willard MD [facility] Hepatology        Today's Diagnoses     Cirrhosis of liver without ascites, unspecified hepatic cirrhosis type (H)    -  1       Follow-ups after your visit        Follow-up notes from your care team     Return in about 6 months (around 3/20/2019).      Your next 10 appointments already scheduled     Mar 19, 2019  8:45 AM CDT   Lab with  LAB   [facility] ([facility])    [address] [place] [id number] [phone]            Mar 19, 2019  9:15 AM CDT   US ABDOMEN COMPLETE with [address] [place] [address] Imaging Center US ([facility])    [address] [place] [id number] [phone]           How do I prepare for my exam? (Food and drink instructions) Adults: No eating, smoking, gum chewing or drinking for 8 hours before the exam. You may take medicine with a small sip of water.  Children: * Infants, breast-fed: may have breast milk up to 2 hours before exam. * Infants, formula: may have bottle until 4 hours before exam. * Children 1-5 years: No food or drink for 4 hours before exam. * Children 6 -12 years: No food or drink for 6 hours before exam. * Children over 12 years: No food or drink for 8 hours before exam.  * J Tube Fed: No need to stop feedings.  What should I wear: Wear comfortable clothes.  How long does the exam take: Most ultrasounds take 30 to 60 minutes.  What should I bring: Bring a list of your medicines, including vitamins, minerals and over-the-counter drugs. It is safest to leave personal items at home.  Do I need a :  No  is needed.  What do I need to tell my doctor: Tell your doctor about any allergies you may have.  What should I do  "after the exam: No restrictions, You may resume normal activities.  What is this test: An ultrasound uses sound waves to make pictures of the body. Sound waves do not cause pain. The only discomfort may be the pressure of the wand against your skin or full bladder.  Who should I call with questions: If you have any questions, please call the Imaging Department where you will have your exam. Directions, parking instructions, and other information is available on our website, [email or web address].            Mar 19, 2019 10:15 AM CDT   (Arrive by 10:00 AM)   Return General Liver with Juancho Willard MD   [facility] Hepatology ([facility])    [address]  [address]  [place] [place] [address] [phone]              Who to contact     If you have questions or need follow up information about today's clinic visit or your schedule please contact [facility] HEPATOLOGY directly at [phone].  Normal or non-critical lab and imaging results will be communicated to you by MyChart, letter or phone within 4 business days after the clinic has received the results. If you do not hear from us within 7 days, please contact the clinic through "[employer]hart or phone. If you have a critical or abnormal lab result, we will notify you by phone as soon as possible.  Submit refill requests through [employer] or call your pharmacy and they will forward the refill request to us. Please allow 3 business days for your refill to be completed.          Additional Information About Your Visit        "[employer] Information     [employer] lets you send messages to your doctor, view your test results, renew your prescriptions, schedule appointments and more. To sign up, go to [email or web address] . Click on \"Log in\" on the left side of the screen, which will take you to the Welcome page. Then click on \"Sign up Now\" on the right side of the page.     You will be asked to enter the access code listed below, as well as some " "personal information. Please follow the directions to create your username and password.     Your access code is: [email or web address]  Expires: 2018  6:30 AM     Your access code will  in 90 days. If you need help or a new code, please call your [facility] clinic or [phone].        Care EveryWhere ID     This is your Care EveryWhere ID. This could be used by other organizations to access your [facility] medical records  [id number]        Your Vitals Were     Pulse Temperature Height Pulse Oximetry BMI (Body Mass Index)       82 97.8  F (36.6  C) (Oral) 1.803 m (5' 11\") 94% 25.24 kg/m2        Blood Pressure from Last 3 Encounters:   18 132/66   18 139/64   17 151/80    Weight from Last 3 Encounters:   18 82.1 kg (181 lb)   18 85.1 kg (187 lb 9.6 oz)   17 79.8 kg (176 lb)              We Performed the Following     Schedule follow up appointments          Today's Medication Changes          These changes are accurate as of 18 11:59 PM.  If you have any questions, ask your nurse or doctor.               These medicines have changed or have updated prescriptions.        Dose/Directions    furosemide 40 MG tablet   Commonly known as:  LASIX   This may have changed:  how much to take   Used for:  Alcoholic cirrhosis of liver with ascites (H)        Dose:  80 mg   Take 2 tablets (80 mg) by mouth daily   Quantity:  60 tablet   Refills:  3                Primary Care Provider Office Phone # Fax #    David Heller [phone]       [place] [facility] [address] [facility] [address]        Equal Access to Services     JUAN DIEGO CHAMBERLAIN : Hadii sebas Lynn, wabettyda luwillam, qaybta yosefalnicolás downey. So [facility] [phone].    ATENCIÓN: Si habla español, tiene a marc disposición servicios gratuitos de asistencia lingüística. Llame al [phone].    We comply with applicable federal civil rights laws and [place] " laws. We do not discriminate on the basis of race, color, national origin, age, disability, sex, sexual orientation, or gender identity.            Thank you!     Thank you for choosing [facility] HEPATOLOGY  for your care. Our goal is always to provide you with excellent care. Hearing back from our patients is one way we can continue to improve our services. Please take a few minutes to complete the written survey that you may receive in the mail after your visit with us. Thank you!             Your Updated Medication List - Protect others around you: Learn how to safely use, store and throw away your medicines at www.disposemymeds.org.          This list is accurate as of 9/20/18 11:59 PM.  Always use your most recent med list.                   Brand Name Dispense Instructions for use Diagnosis    aMILoride 5 MG tablet    MIDAMOR    180 tablet    Take 2 tablets (10 mg) by mouth daily    Cirrhosis of liver without ascites, unspecified hepatic cirrhosis type (H)       cetirizine 10 MG tablet    zyrTEC     Take 10 mg by mouth Reported on 3/6/2017        fluticasone 50 MCG/ACT spray    FLONASE     2 sprays Reported on 3/6/2017        furosemide 40 MG tablet    LASIX    60 tablet    Take 2 tablets (80 mg) by mouth daily    Alcoholic cirrhosis of liver with ascites (H)       lactulose 10 GM/15ML solution    CHRONULAC    8100 mL    Take 30 mLs (20 g) by mouth 3 times daily Ensure you have 3 loose BMs/day.    Hepatic encephalopathy (H)       metFORMIN 1000 MG tablet    GLUCOPHAGE     Take 1,000 mg by mouth daily (with dinner)        Pseudoephedrine-APAP  MG Tabs      Take 300 mg by mouth daily        rifaximin 550 MG Tabs tablet    XIFAXAN    60 tablet    Take 1 tablet (550 mg) by mouth 2 times daily    Hepatic encephalopathy (H)       sulfamethoxazole-trimethoprim 800-160 MG per tablet    BACTRIM DS    90 tablet    Take 1 tablet daily.    SBP (spontaneous bacterial peritonitis) (H)       XARELTO PO      Take 10 mg  by mouth daily        zinc sulfate 220 (50 Zn) MG capsule    ZINCATE     Take 50 mg by mouth daily

## 2018-12-03 ENCOUNTER — TRANSFERRED RECORDS (OUTPATIENT)
Dept: HEALTH INFORMATION MANAGEMENT | Facility: CLINIC | Age: 63
End: 2018-12-03

## 2018-12-05 ENCOUNTER — TRANSFERRED RECORDS (OUTPATIENT)
Dept: HEALTH INFORMATION MANAGEMENT | Facility: CLINIC | Age: 63
End: 2018-12-05

## 2019-01-09 NOTE — TELEPHONE ENCOUNTER
Chief complaint:   Chief Complaint   Patient presents with   • Leg Pain     Pt reports left upper leg pain that started 1 month ago       Vitals:  Visit Vitals  /69   Pulse 58   Temp 98.6 °F (37 °C) (Temporal)   Resp 18   Wt 80.3 kg   SpO2 99%   BMI 29.45 kg/m²       HISTORY OF PRESENT ILLNESS     Carlotta Rangel is a 82 year old female with a history of peripheral neuropathy who presents to the urgent care for evaluation of left upper leg pain. Patient reports symptoms started a month ago. Patient states the pain was initially occasional but then began to happen more frequently. She reports now until almost a constant pain that increases from time to time. She reports it as a burning and achy and deep pain within her left upper thigh. She states some times it increases in intensity. She denies any issues walking. She does report from time to time her left low back will catch and hurt. And from time to time her left hip will hurt. She denies any numbness or tingling.        Other significant problems:  Patient Active Problem List    Diagnosis Date Noted   • Leg pain, bilateral 02/23/2017     Priority: High   • Foraminal stenosis of lumbar region 02/23/2017     Priority: High   • Atypical chest pain 12/15/2015     Priority: Medium   • Trigger middle finger of left hand 08/21/2018     Priority: Low   • History of diverticulosis 01/05/2018     Priority: Low   • Lumbar spondylosis with myelopathy 12/08/2016     Priority: Low   • Spondylosis, cervical, with myelopathy 11/03/2016     Priority: Low   • Atrial fibrillation (CMS/HCC) 04/16/2015     Priority: Low     12/4/17 OV c/o some vibration sensations in left chest.  Possible pulmonary vein firing. Discussed possible need for pulmonary vein isolation ablation in the future.    Echo date: 12/15/2015  LVEF: 73 %  LA size: 27.8 mL  IVS: 0.9 cm          LVPW: 1.0 cm  RVSP: not calculated  Coronary status: Normal Nuclear Stress Test 12/15/2015     CHADS-VASc score: 4  Ultrasound results received writer will give to Dr. Willard to review in clinic tomorrow.  Updated patient that results received and will be reviewed by Dr. Willard,  writer read conclusion to patient.  Patient advised to follow up with PCP if pleurisy type symptoms do not continue to improve or get worse, pt voiced understanding.      Conclusion:  1.  Stable cirrhotic configuration of the liver without focal hepatic lesion.  2.  Normal abdominal liver duplex.  3.  Patent TIPS  4.  No ascites.     Anticoagulation therapy: None       Antiarrhythmic medications: Flecainide   Procedures: None       • Peripheral neuropathy      Priority: Low   • Urinary incontinence      Priority: Low   • Hypothyroidism      Priority: Low   • Meningioma (CMS/HCC)      Priority: Low   • High risk medication use: FLECAINIDE 07/15/2013     Priority: Low     Normal Nuclear Stress Test 12/15/2015    EKG. 12/04/17      Sinus rhythm narrow QRS heart rate in 50's  Vent Rate 53 BPM  ND interval 152 ms  QRS duration 86 ms  QT/QTc 428/401 ms         • Acid reflux      Priority: Low   • Adenoma of large intestine      Priority: Low       PAST MEDICAL, FAMILY AND SOCIAL HISTORY     Medications:  Current Outpatient Medications   Medication   • rosuvastatin (CRESTOR) 5 MG tablet   • aspirin 81 MG tablet   • fluticasone (FLONASE) 50 MCG/ACT nasal spray   • amLODIPine (NORVASC) 2.5 MG tablet   • levothyroxine (SYNTHROID, LEVOTHROID) 100 MCG tablet   • flecainide (TAMBOCOR) 50 MG tablet   • valsartan-hydroCHLOROthiazide (DIOVAN-HCT) 320-12.5 MG per tablet   • pantoprazole (PROTONIX) 40 MG tablet   • dicyclomine (BENTYL) 20 MG tablet   • oxybutynin (DITROPAN) 5 MG tablet   • Cyanocobalamin (VITAMIN B12 PO)   • CALCIUM CITRATE-VITAMIN D PO     No current facility-administered medications for this visit.        Allergies:  ALLERGIES:   Allergen Reactions   • Tetanus Toxoid      Tetanus toxoid absorbed  High fever, nauseated   • Multaq [Dronedarone Hydrochloride]      Very loose stools       Past Medical  History/Surgeries:  Past Medical History:   Diagnosis Date   • Alternating constipation and diarrhea 09/12/2018    w/ abdominal cramping - sporadic    • Arthritis    • Claustrophobia    • Disorder of bone and cartilage, unspecified 05/04/2011   • Essential (primary) hypertension    • Fracture     Right tibia fracture (2005); right ankle fracture (2012)-both treated w/ casting    • GERD (gastroesophageal reflux disease)     10./6/14S/P EGD w/ Dil,  esophagitis-Dr. Webber   • Gout     Uric acid 6.4 in 7/2014   • H/O carotid atherosclerosis     12/11/15.  Ultrasound: Mild plaque, otherwise no hemodynamic sig.lesions   • High risk medication use: FLECAINIDE 7/15/2013    Normal Stress Test January 2014    • History of dysphagia     10./6/14S/P EGD w/ Dil, esophagitis-Dr. Webber   • History of one miscarriage     s/p D and C    • Hx A-fib, paroxysmal     Off Coumadin in 2013,Sinus on flecainide+ ASA -Dr. Salgado.  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  Tingling and numbness   • Ptosis of both eyelids    • Tubular adenoma of colon     TC-->TAx2-2013- Dr. Webber   • Urinary incontinence     wears pads    • Urinary tract infection        Past Surgical History:   Procedure Laterality Date   • Appendectomy     • Carpal tunnel release Bilateral    • Colonoscopy w/ polypectomy  07/21/2013    Dr Webber    • D and c      x1 for miscarriage    • Egd dilation of duod w/baloon  10/06/2014    Dr Webber    • Egd dilation of duod w/baloon  05/07/2013    Dr Webber    • Eye surgery Left 2012    cataract extraction w/ IOL   • Eye surgery Right 04/25/2015    cataract extraction w/ IOL - Dr Tomas    • Hysterectomy     • Knee arthroscopy w/ meniscal repair Right 9/2015    Procedure done at [facility]   • Loop recorder implant  06/2018    Dr Wood [facility]    • Removal gallbladder     • Service to gastroenterology  2012    EGD  COLONOSCOPY   • Tonsillectomy and adenoidectomy     • Trigger finger release Left 08/09/2018    First annular pulley release of the left long finger for flexor stenosing tenosynovitis   • Vaginal delivery      x2    • Xray mammogram screening bilat  11/01/2011       Family  History:  Family History   Problem Relation Age of Onset   • Cancer Mother         lung    • Hypertension Mother    • Osteoarthritis Mother    • Heart disease Father    • Cancer Father         lung    • Diabetes Father    • Osteoarthritis Father    • Cancer Brother    • Patient is unaware of any medical problems Daughter    • Patient is unaware of any medical problems Daughter        Social History:  Social History     Tobacco Use   • Smoking status: Former Smoker     Packs/day: 1.00     Years: 30.00     Pack years: 30.00     Types: Cigarettes     Last attempt to quit: 1993     Years since quittin.0   • Smokeless tobacco: Never Used   Substance Use Topics   • Alcohol use: Yes     Alcohol/week: 1.8 oz     Types: 3 Glasses of wine per week     Comment: 2-3 nights a week       REVIEW OF SYSTEMS     Review of Systems   ROS included in HPI above    PHYSICAL EXAM     Physical Exam   Constitutional: She is oriented to person, place, and time. She appears well-developed and well-nourished. No distress.   HENT:   Head: Normocephalic and atraumatic.   Right Ear: External ear normal.   Left Ear: External ear normal.   Pulmonary/Chest: Effort normal. No respiratory distress.   Musculoskeletal:   Lower extremity strength 5 out of 5. Sensation intact bilaterally.  No spinal tenderness. No SI joint tenderness. No pain with internal and external rotation of the left hip. Tenderness not reproducible on palpation of the upper femur. No ecchymosis, no edema, no lesion.   Neurological: She is alert and oriented to person, place, and time.   Skin: Skin is warm and dry. She is not diaphoretic.   Psychiatric: She has a normal mood and affect. Her behavior is normal. Thought content normal.   Vitals reviewed.      Xr Femur 2+ Vw Left    Result Date: 2019  Narrative: XR FEMUR 2+ VW LEFT CLINICAL HISTORY: Pain COMPARISON: None PROCEDURES: XR FEMUR 2+ VW LEFT FINDINGS: Left femur reveals no fractures or dislocations. There  appears to be soft tissue calcification about the knee suggesting a possible crystalline arthropathy. There is prominent spur formation about the left acetabula and subcortical cyst formation in the femoral head and likely acetabula as well. Mild spurring is seen the base of the left femoral head.     Impression: IMPRESSION: Prominent degenerative change left hip. Question crystalline arthropathy due to soft tissue calcification about the knee.       ASSESSMENT/PLAN     1. Left leg pain  My initial interpretation of femur and hip x-ray demonstrates normal femur. Moderate osteoarthritis noted within the left hip joint. This was reviewed with the patient.  - XR FEMUR 2+ VW LEFT; Future  - XR HIP 2 VW LEFT; Future    2. Osteoarthritis of left hip, unspecified osteoarthritis type  I recommended patient follow-up with orthopedics to discuss any treatments. The patient I discussed trying any pain medications including Tylenol or ibuprofen, heat, ice.    Patient was given written and verbal instructions regarding above diagnosis and appropriate home care tips and strategies including when it would be advised to return to the urgent care for re-evaluation. All questions were answered prior to discharge.    See the After Visit Summary for additional instructions, follow-up plans and/or emergency room precautions discussed with the patient.    Patient (or parent/guardian if applicable) was in agreement with treatment and discharge plan, appropriately stable at time of discharge from urgent care clinic.    Yolanda Pyle DO

## 2019-02-28 ENCOUNTER — DOCUMENTATION ONLY (OUTPATIENT)
Dept: CARE COORDINATION | Facility: CLINIC | Age: 64
End: 2019-02-28

## 2019-03-19 ENCOUNTER — OFFICE VISIT (OUTPATIENT)
Dept: GASTROENTEROLOGY | Facility: CLINIC | Age: 64
End: 2019-03-19
Attending: INTERNAL MEDICINE
Payer: COMMERCIAL

## 2019-03-19 ENCOUNTER — ANCILLARY PROCEDURE (OUTPATIENT)
Dept: ULTRASOUND IMAGING | Facility: CLINIC | Age: 64
End: 2019-03-19
Attending: INTERNAL MEDICINE
Payer: COMMERCIAL

## 2019-03-19 VITALS
HEART RATE: 92 BPM | BODY MASS INDEX: 26.82 KG/M2 | HEIGHT: 71 IN | DIASTOLIC BLOOD PRESSURE: 88 MMHG | WEIGHT: 191.6 LBS | OXYGEN SATURATION: 98 % | SYSTOLIC BLOOD PRESSURE: 151 MMHG

## 2019-03-19 DIAGNOSIS — K74.60 CIRRHOSIS OF LIVER WITHOUT ASCITES, UNSPECIFIED HEPATIC CIRRHOSIS TYPE (H): ICD-10-CM

## 2019-03-19 DIAGNOSIS — K74.60 CIRRHOSIS OF LIVER WITHOUT ASCITES, UNSPECIFIED HEPATIC CIRRHOSIS TYPE (H): Primary | ICD-10-CM

## 2019-03-19 DIAGNOSIS — Z95.828 S/P TIPS (TRANSJUGULAR INTRAHEPATIC PORTOSYSTEMIC SHUNT): ICD-10-CM

## 2019-03-19 LAB
AFP SERPL-MCNC: 7.6 UG/L (ref 0–8)
ALBUMIN SERPL-MCNC: 3.3 G/DL (ref 3.4–5)
ALP SERPL-CCNC: 216 U/L (ref 40–150)
ALT SERPL W P-5'-P-CCNC: 28 U/L (ref 0–70)
ANION GAP SERPL CALCULATED.3IONS-SCNC: 6 MMOL/L (ref 3–14)
AST SERPL W P-5'-P-CCNC: 46 U/L (ref 0–45)
BILIRUB DIRECT SERPL-MCNC: 0.3 MG/DL (ref 0–0.2)
BILIRUB SERPL-MCNC: 1 MG/DL (ref 0.2–1.3)
BUN SERPL-MCNC: 10 MG/DL (ref 7–30)
CALCIUM SERPL-MCNC: 9.3 MG/DL (ref 8.5–10.1)
CHLORIDE SERPL-SCNC: 110 MMOL/L (ref 94–109)
CO2 SERPL-SCNC: 25 MMOL/L (ref 20–32)
CREAT SERPL-MCNC: 0.67 MG/DL (ref 0.66–1.25)
ERYTHROCYTE [DISTWIDTH] IN BLOOD BY AUTOMATED COUNT: 15.5 % (ref 10–15)
GFR SERPL CREATININE-BSD FRML MDRD: >90 ML/MIN/{1.73_M2}
GLUCOSE SERPL-MCNC: 106 MG/DL (ref 70–99)
HCT VFR BLD AUTO: 43.6 % (ref 40–53)
HGB BLD-MCNC: 14.3 G/DL (ref 13.3–17.7)
INR PPP: 1.2 (ref 0.86–1.14)
MCH RBC QN AUTO: 28.8 PG (ref 26.5–33)
MCHC RBC AUTO-ENTMCNC: 32.8 G/DL (ref 31.5–36.5)
MCV RBC AUTO: 88 FL (ref 78–100)
PLATELET # BLD AUTO: 125 10E9/L (ref 150–450)
POTASSIUM SERPL-SCNC: 4.4 MMOL/L (ref 3.4–5.3)
PROT SERPL-MCNC: 6.6 G/DL (ref 6.8–8.8)
RBC # BLD AUTO: 4.97 10E12/L (ref 4.4–5.9)
SODIUM SERPL-SCNC: 142 MMOL/L (ref 133–144)
WBC # BLD AUTO: 23.9 10E9/L (ref 4–11)

## 2019-03-19 PROCEDURE — 80076 HEPATIC FUNCTION PANEL: CPT | Performed by: INTERNAL MEDICINE

## 2019-03-19 PROCEDURE — 82105 ALPHA-FETOPROTEIN SERUM: CPT | Performed by: INTERNAL MEDICINE

## 2019-03-19 PROCEDURE — 80048 BASIC METABOLIC PNL TOTAL CA: CPT | Performed by: INTERNAL MEDICINE

## 2019-03-19 PROCEDURE — 85027 COMPLETE CBC AUTOMATED: CPT | Performed by: INTERNAL MEDICINE

## 2019-03-19 PROCEDURE — G0463 HOSPITAL OUTPT CLINIC VISIT: HCPCS | Mod: ZF

## 2019-03-19 PROCEDURE — 85610 PROTHROMBIN TIME: CPT | Performed by: INTERNAL MEDICINE

## 2019-03-19 PROCEDURE — 36415 COLL VENOUS BLD VENIPUNCTURE: CPT | Performed by: INTERNAL MEDICINE

## 2019-03-19 RX ORDER — ASPIRIN 325 MG
650 TABLET ORAL
Status: ON HOLD | COMMUNITY
Start: 2018-08-10 | End: 2022-01-21

## 2019-03-19 ASSESSMENT — PAIN SCALES - GENERAL: PAINLEVEL: SEVERE PAIN (7)

## 2019-03-19 ASSESSMENT — MIFFLIN-ST. JEOR: SCORE: 1691.22

## 2019-03-19 NOTE — LETTER
"3/19/2019      RE: Mehdi Martinez  [address] [facility] [address]       HISTORY OF PRESENT ILLNESS:  I had the pleasure of seeing Mehdi Martinez for followup in the Liver Clinic at the [facility] on 03/19/2019.  Mr. 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  He is a sustained virologic responder to hepatitis C treatment.  He is also status post TIPS for diuretic refractory ascites.      He is doing fairly well at this visit.  He denies any abdominal pain, itching or skin rash or fatigue.  He denies any increased abdominal girth or lower extremity edema.  He denies any fevers or chills, cough or shortness of breath.  He denies any nausea or vomiting, diarrhea or constipation.  His appetite has been good, and his weight actually is up quite bit, and he has gained about 50 pounds since his aftab several years ago.      He does have CLL which is being managed by a hematologist in the [facility] system.     Current Outpatient Medications   Medication     aMILoride (MIDAMOR) 5 MG tablet     aspirin (ASA) 325 MG tablet     cetirizine (ZYRTEC) 10 MG tablet     fluticasone (FLONASE) 50 MCG/ACT nasal spray     metFORMIN (GLUCOPHAGE) 1000 MG tablet     rifaximin (XIFAXAN) 550 MG TABS tablet     zinc sulfate (ZINCATE) 220 MG capsule     furosemide (LASIX) 40 MG tablet     lactulose (CHRONULAC) 10 GM/15ML solution     Pseudoephedrine-APAP  MG TABS     Rivaroxaban (XARELTO PO)     sulfamethoxazole-trimethoprim (BACTRIM DS) 800-160 MG per tablet     No current facility-administered medications for this visit.      Vital signs:      BP: 151/88 Pulse: 92     SpO2: 98 %     Height: 180.3 cm (5' 11\") Weight: 86.9 kg (191 lb 9.6 oz)    In general he looks relatively well.  HEENT exam shows no scleral icterus or temporal muscle wasting.  His chest is clear.  His abdominal exam shows no increase in girth.  No masses or tenderness to palpation are present.  His " liver is 10 cm in span without left lobe enlargement.  No spleen tip is palpable.  Extremity exam shows no edema.  Skin exam shows no stigmata of chronic liver disease.  Neurologic exam shows no asterixis.     Recent Results (from the past 168 hour(s))   AFP tumor marker [UWO711]    Collection Time: 03/19/19  8:35 AM   Result Value Ref Range    Alpha Fetoprotein 7.6 0 - 8 ug/L   INR [[id number]]    Collection Time: 03/19/19  8:35 AM   Result Value Ref Range    INR 1.20 (H) 0.86 - 1.14   CBC with platelets [[id number]]    Collection Time: 03/19/19  8:35 AM   Result Value Ref Range    WBC 23.9 (H) 4.0 - 11.0 10e9/L    RBC Count 4.97 4.4 - 5.9 10e12/L    Hemoglobin 14.3 13.3 - 17.7 g/dL    Hematocrit 43.6 40.0 - 53.0 %    MCV 88 78 - 100 fl    MCH 28.8 26.5 - 33.0 pg    MCHC 32.8 31.5 - 36.5 g/dL    RDW 15.5 (H) 10.0 - 15.0 %    Platelet Count 125 (L) 150 - 450 10e9/L   Basic metabolic panel [LAB15]    Collection Time: 03/19/19  8:35 AM   Result Value Ref Range    Sodium 142 133 - 144 mmol/L    Potassium 4.4 3.4 - 5.3 mmol/L    Chloride 110 (H) 94 - 109 mmol/L    Carbon Dioxide 25 20 - 32 mmol/L    Anion Gap 6 3 - 14 mmol/L    Glucose 106 (H) 70 - 99 mg/dL    Urea Nitrogen 10 7 - 30 mg/dL    Creatinine 0.67 0.66 - 1.25 mg/dL    GFR Estimate >90 >60 mL/min/[1.73_m2]    GFR Estimate If Black >90 >60 mL/min/[1.73_m2]    Calcium 9.3 8.5 - 10.1 mg/dL   Hepatic Panel [LAB20]    Collection Time: 03/19/19  8:35 AM   Result Value Ref Range    Bilirubin Direct 0.3 (H) 0.0 - 0.2 mg/dL    Bilirubin Total 1.0 0.2 - 1.3 mg/dL    Albumin 3.3 (L) 3.4 - 5.0 g/dL    Protein Total 6.6 (L) 6.8 - 8.8 g/dL    Alkaline Phosphatase 216 (H) 40 - 150 U/L    ALT 28 0 - 70 U/L    AST 46 (H) 0 - 45 U/L   US Abdomen Complete w Doppler Complete    Collection Time: 03/19/19  9:56 AM   Result Value Ref Range    Radiologist flags New lesion in the ken[place] hepatis       I also did review his ultrasound which shows a lesion in the ken[place] hepatis.  It is really  unclear what this represents, but the recommendation was to proceed with an MRI.      IMPRESSION:  Mr. 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  He is a sustained virologic responder to hepatitis C treatment.  I will get an MRI to evaluate the lesion in the ken[place] hepatis and otherwise will see him back in the clinic in 6 months for repeat ultrasound and blood work.  He is up-to-date on vaccinations and other cancer screening.      Thank you very much for allowing me to participate in the care of this patient.  If you have any questions regarding my recommendations, please do not hesitate to contact me.       Juancho Willard MD      Professor of Medicine  [employer]      Executive Medical Director, Solid Organ Transplant Program  [facility]

## 2019-03-19 NOTE — NURSING NOTE
"Chief Complaint   Patient presents with     RECHECK     Cirrhosis of liver without ascites     /88   Pulse 92   Ht 1.803 m (5' 11\")   Wt 86.9 kg (191 lb 9.6 oz)   SpO2 98%   BMI 26.72 kg/m    Ginger Carter CMA    "

## 2019-03-19 NOTE — PROGRESS NOTES
"HISTORY OF PRESENT ILLNESS:  I had the pleasure of seeing Mehdi Martinez for followup in the Liver Clinic at the [facility] on 03/19/2019.  Mr. 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  He is a sustained virologic responder to hepatitis C treatment.  He is also status post TIPS for diuretic refractory ascites.      He is doing fairly well at this visit.  He denies any abdominal pain, itching or skin rash or fatigue.  He denies any increased abdominal girth or lower extremity edema.  He denies any fevers or chills, cough or shortness of breath.  He denies any nausea or vomiting, diarrhea or constipation.  His appetite has been good, and his weight actually is up quite bit, and he has gained about 50 pounds since his aftab several years ago.      He does have CLL which is being managed by a hematologist in the [facility] system.     Current Outpatient Medications   Medication     aMILoride (MIDAMOR) 5 MG tablet     aspirin (ASA) 325 MG tablet     cetirizine (ZYRTEC) 10 MG tablet     fluticasone (FLONASE) 50 MCG/ACT nasal spray     metFORMIN (GLUCOPHAGE) 1000 MG tablet     rifaximin (XIFAXAN) 550 MG TABS tablet     zinc sulfate (ZINCATE) 220 MG capsule     furosemide (LASIX) 40 MG tablet     lactulose (CHRONULAC) 10 GM/15ML solution     Pseudoephedrine-APAP  MG TABS     Rivaroxaban (XARELTO PO)     sulfamethoxazole-trimethoprim (BACTRIM DS) 800-160 MG per tablet     No current facility-administered medications for this visit.      Vital signs:      BP: 151/88 Pulse: 92     SpO2: 98 %     Height: 180.3 cm (5' 11\") Weight: 86.9 kg (191 lb 9.6 oz)    In general he looks relatively well.  HEENT exam shows no scleral icterus or temporal muscle wasting.  His chest is clear.  His abdominal exam shows no increase in girth.  No masses or tenderness to palpation are present.  His liver is 10 cm in span without left lobe enlargement.  No spleen tip is palpable. "  Extremity exam shows no edema.  Skin exam shows no stigmata of chronic liver disease.  Neurologic exam shows no asterixis.     Recent Results (from the past 168 hour(s))   AFP tumor marker [[id number]]    Collection Time: 03/19/19  8:35 AM   Result Value Ref Range    Alpha Fetoprotein 7.6 0 - 8 ug/L   INR [[id number]]    Collection Time: 03/19/19  8:35 AM   Result Value Ref Range    INR 1.20 (H) 0.86 - 1.14   CBC with platelets [[id number]]    Collection Time: 03/19/19  8:35 AM   Result Value Ref Range    WBC 23.9 (H) 4.0 - 11.0 10e9/L    RBC Count 4.97 4.4 - 5.9 10e12/L    Hemoglobin 14.3 13.3 - 17.7 g/dL    Hematocrit 43.6 40.0 - 53.0 %    MCV 88 78 - 100 fl    MCH 28.8 26.5 - 33.0 pg    MCHC 32.8 31.5 - 36.5 g/dL    RDW 15.5 (H) 10.0 - 15.0 %    Platelet Count 125 (L) 150 - 450 10e9/L   Basic metabolic panel [LAB15]    Collection Time: 03/19/19  8:35 AM   Result Value Ref Range    Sodium 142 133 - 144 mmol/L    Potassium 4.4 3.4 - 5.3 mmol/L    Chloride 110 (H) 94 - 109 mmol/L    Carbon Dioxide 25 20 - 32 mmol/L    Anion Gap 6 3 - 14 mmol/L    Glucose 106 (H) 70 - 99 mg/dL    Urea Nitrogen 10 7 - 30 mg/dL    Creatinine 0.67 0.66 - 1.25 mg/dL    GFR Estimate >90 >60 mL/min/[1.73_m2]    GFR Estimate If Black >90 >60 mL/min/[1.73_m2]    Calcium 9.3 8.5 - 10.1 mg/dL   Hepatic Panel [LAB20]    Collection Time: 03/19/19  8:35 AM   Result Value Ref Range    Bilirubin Direct 0.3 (H) 0.0 - 0.2 mg/dL    Bilirubin Total 1.0 0.2 - 1.3 mg/dL    Albumin 3.3 (L) 3.4 - 5.0 g/dL    Protein Total 6.6 (L) 6.8 - 8.8 g/dL    Alkaline Phosphatase 216 (H) 40 - 150 U/L    ALT 28 0 - 70 U/L    AST 46 (H) 0 - 45 U/L   US Abdomen Complete w Doppler Complete    Collection Time: 03/19/19  9:56 AM   Result Value Ref Range    Radiologist flags New lesion in the k[place] hepatis       I also did review his ultrasound which shows a lesion in the kenya hepatis.  It is really unclear what this represents, but the recommendation was to proceed with an MRI.       IMPRESSION:  Mr. 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  He is a sustained virologic responder to hepatitis C treatment.  I will get an MRI to evaluate the lesion in the ken[place] hepatis and otherwise will see him back in the clinic in 6 months for repeat ultrasound and blood work.  He is up-to-date on vaccinations and other cancer screening.      Thank you very much for allowing me to participate in the care of this patient.  If you have any questions regarding my recommendations, please do not hesitate to contact me.       Juancho Willard MD      Professor of Medicine  [facility] [employer]      Executive Medical Director, Solid Organ Transplant Program  [facility]

## 2019-03-19 NOTE — LETTER
"3/19/2019       RE: Mehdi Martinez  [address] [facility] [address]     Dear Colleague,    Thank you for referring your patient, Mehdi Martinez, to the [facility] HEPATOLOGY at General acute hospital. Please see a copy of my visit note below.    HISTORY OF PRESENT ILLNESS:  I had the pleasure of seeing Mehdi Martinez for followup in the Liver Clinic at the [facility] on 03/19/2019.  Mr. 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  He is a sustained virologic responder to hepatitis C treatment.  He is also status post TIPS for diuretic refractory ascites.      He is doing fairly well at this visit.  He denies any abdominal pain, itching or skin rash or fatigue.  He denies any increased abdominal girth or lower extremity edema.  He denies any fevers or chills, cough or shortness of breath.  He denies any nausea or vomiting, diarrhea or constipation.  His appetite has been good, and his weight actually is up quite bit, and he has gained about 50 pounds since his aftab several years ago.      He does have CLL which is being managed by a hematologist in the [facility].     Current Outpatient Medications   Medication     aMILoride (MIDAMOR) 5 MG tablet     aspirin (ASA) 325 MG tablet     cetirizine (ZYRTEC) 10 MG tablet     fluticasone (FLONASE) 50 MCG/ACT nasal spray     metFORMIN (GLUCOPHAGE) 1000 MG tablet     rifaximin (XIFAXAN) 550 MG TABS tablet     zinc sulfate (ZINCATE) 220 MG capsule     furosemide (LASIX) 40 MG tablet     lactulose (CHRONULAC) 10 GM/15ML solution     Pseudoephedrine-APAP  MG TABS     Rivaroxaban (XARELTO PO)     sulfamethoxazole-trimethoprim (BACTRIM DS) 800-160 MG per tablet     No current facility-administered medications for this visit.      Vital signs:      BP: 151/88 Pulse: 92     SpO2: 98 %     Height: 180.3 cm (5' 11\") Weight: 86.9 kg (191 lb 9.6 oz)    In general he looks " relatively well.  HEENT exam shows no scleral icterus or temporal muscle wasting.  His chest is clear.  His abdominal exam shows no increase in girth.  No masses or tenderness to palpation are present.  His liver is 10 cm in span without left lobe enlargement.  No spleen tip is palpable.  Extremity exam shows no edema.  Skin exam shows no stigmata of chronic liver disease.  Neurologic exam shows no asterixis.     Recent Results (from the past 168 hour(s))   AFP tumor marker [[id number]]    Collection Time: 03/19/19  8:35 AM   Result Value Ref Range    Alpha Fetoprotein 7.6 0 - 8 ug/L   INR [[id number]]    Collection Time: 03/19/19  8:35 AM   Result Value Ref Range    INR 1.20 (H) 0.86 - 1.14   CBC with platelets [[id number]]    Collection Time: 03/19/19  8:35 AM   Result Value Ref Range    WBC 23.9 (H) 4.0 - 11.0 10e9/L    RBC Count 4.97 4.4 - 5.9 10e12/L    Hemoglobin 14.3 13.3 - 17.7 g/dL    Hematocrit 43.6 40.0 - 53.0 %    MCV 88 78 - 100 fl    MCH 28.8 26.5 - 33.0 pg    MCHC 32.8 31.5 - 36.5 g/dL    RDW 15.5 (H) 10.0 - 15.0 %    Platelet Count 125 (L) 150 - 450 10e9/L   Basic metabolic panel [LAB15]    Collection Time: 03/19/19  8:35 AM   Result Value Ref Range    Sodium 142 133 - 144 mmol/L    Potassium 4.4 3.4 - 5.3 mmol/L    Chloride 110 (H) 94 - 109 mmol/L    Carbon Dioxide 25 20 - 32 mmol/L    Anion Gap 6 3 - 14 mmol/L    Glucose 106 (H) 70 - 99 mg/dL    Urea Nitrogen 10 7 - 30 mg/dL    Creatinine 0.67 0.66 - 1.25 mg/dL    GFR Estimate >90 >60 mL/min/[1.73_m2]    GFR Estimate If Black >90 >60 mL/min/[1.73_m2]    Calcium 9.3 8.5 - 10.1 mg/dL   Hepatic Panel [LAB20]    Collection Time: 03/19/19  8:35 AM   Result Value Ref Range    Bilirubin Direct 0.3 (H) 0.0 - 0.2 mg/dL    Bilirubin Total 1.0 0.2 - 1.3 mg/dL    Albumin 3.3 (L) 3.4 - 5.0 g/dL    Protein Total 6.6 (L) 6.8 - 8.8 g/dL    Alkaline Phosphatase 216 (H) 40 - 150 U/L    ALT 28 0 - 70 U/L    AST 46 (H) 0 - 45 U/L   US Abdomen Complete w Doppler Complete     Collection Time: 03/19/19  9:56 AM   Result Value Ref Range    Radiologist flags New lesion in the [place] hepatis       I also did review his ultrasound which shows a lesion in the [place] hepatis.  It is really unclear what this represents, but the recommendation was to proceed with an MRI.      IMPRESSION:  Mr. 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  He is a sustained virologic responder to hepatitis C treatment.  I will get an MRI to evaluate the lesion in the [place] hepatis and otherwise will see him back in the clinic in 6 months for repeat ultrasound and blood work.  He is up-to-date on vaccinations and other cancer screening.      Thank you very much for allowing me to participate in the care of this patient.  If you have any questions regarding my recommendations, please do not hesitate to contact me.       Juancho Willard MD      Professor of Medicine  [facility] [employer]      Executive Medical Director, Solid Organ Transplant Program  [facility]

## 2019-03-19 NOTE — PROGRESS NOTES
[facility] Hepatology Note      Encounter Date: Mar 19, 2019    CC:  Chief Complaint   Patient presents with     RECHECK     Cirrhosis of liver without ascites         History of Present Illness:  Mr. Mehdi Martinez is a 63 year old male who presents as a follow-up for cirrhosis. The patient was last seen 09/20/2018 when ***.     Past Medical History:   Patient Active Problem List   Diagnosis     Cirrhosis (H)     CLL (chronic lymphocytic leukemia) (H)     Chronic hepatitis C without hepatic coma (H)     Alcoholic cirrhosis of liver with ascites (H)     Umbilical hernia     Type II diabetes mellitus (H)     Past Medical History:   Diagnosis Date     Alcohol abuse     quit September 2015     Allergic rhinitis      Aortic calcification (H)      Asthma      Chronic hepatitis C with cirrhosis (H)      Diabetes mellitus, type II (H)      Diverticulosis      HTN (hypertension)      Portal hypertension (H)      SBP (spontaneous bacterial peritonitis) (H) Jan 2016     Splenomegaly      Past Surgical History:   Procedure Laterality Date     ARTHROSCOPY SHOULDER  90s     HC ESOPHAGOSCOPY, DIAGNOSTIC  Jan 2015    small varices, portal HTN gastropathy in the stomach, otherwise normal     HERNIA REPAIR      inguinal     HERNIORRHAPHY UMBILICAL N/A 8/18/2016    Procedure: HERNIORRHAPHY UMBILICAL;  Surgeon: Yadira Lutz MD;  Location: [facility] OR     KNEE SURGERY  80s     LAPAROSCOPY DIAGNOSTIC (GENERAL) N/A 1/28/2016    Procedure: LAPAROSCOPY DIAGNOSTIC (GENERAL);  Surgeon: Jeannine Schneider MD;  Location: [facility] OR     laproscopic cholecystectomy  Nov 2015    liver biopsy performed at the same time       Medications:  Current Outpatient Medications   Medication Sig Dispense Refill     aMILoride (MIDAMOR) 5 MG tablet Take 2 tablets (10 mg) by mouth daily 180 tablet 3     aspirin (ASA) 325 MG tablet Take 650 mg by mouth       cetirizine (ZYRTEC) 10 MG tablet Take 10 mg by mouth Reported on 3/6/2017   "     fluticasone (FLONASE) 50 MCG/ACT nasal spray 2 sprays Reported on 3/6/2017       metFORMIN (GLUCOPHAGE) 1000 MG tablet Take 1,000 mg by mouth daily (with dinner)        rifaximin (XIFAXAN) 550 MG TABS tablet Take 1 tablet (550 mg) by mouth 2 times daily 60 tablet 11     zinc sulfate (ZINCATE) 220 MG capsule Take 50 mg by mouth daily       furosemide (LASIX) 40 MG tablet Take 2 tablets (80 mg) by mouth daily (Patient not taking: Reported on 3/19/2019) 60 tablet 3     lactulose (CHRONULAC) 10 GM/15ML solution Take 30 mLs (20 g) by mouth 3 times daily Ensure you have 3 loose BMs/day. (Patient not taking: Reported on 3/20/2018) 8100 mL 3     Pseudoephedrine-APAP  MG TABS Take 300 mg by mouth daily       Rivaroxaban (XARELTO PO) Take 10 mg by mouth daily       sulfamethoxazole-trimethoprim (BACTRIM DS) 800-160 MG per tablet Take 1 tablet daily. (Patient not taking: Reported on 9/20/2018) 90 tablet 3       Allergies   Allergen Reactions     Artemisia Absinthium Difficulty breathing     Mold      Molds & Smuts      Other reaction(s): Other, see comments  No reaction documented     Ragweeds      Terfenadine Itching     Itchy rash       /88   Pulse 92   Ht 1.803 m (5' 11\")   Wt 86.9 kg (191 lb 9.6 oz)   SpO2 98%   BMI 26.72 kg/m      Physical Exam:  -***    Impression/Plan:  1. Cirrhosis caused by chronic hepatitis C    ***    ***    2. ***    ***    ***    3. ***    ***    ***    4. Health Care Maintenance     Up to date on Vaccine     Up to date on cancer screening     No further intervention required. Patient to report changes.     Follow-up {rfmultiple:[id number]} {follow up in days/weeks/months:268897}.         Staff Involved:  Staff/Scribe    Scribe Disclosure:   Meri MUNOZ, am serving as a scribe to document services personally performed by Dr. Juancho Willard, based on data collection and the provider's statements to me.        Juancho Willard MD      Professor of Medicine  [facility] [employer]      Executive Medical Director, Solid Organ Transplant Program  [facility]

## 2019-03-27 ENCOUNTER — TRANSFERRED RECORDS (OUTPATIENT)
Dept: HEALTH INFORMATION MANAGEMENT | Facility: CLINIC | Age: 64
End: 2019-03-27

## 2019-03-28 DIAGNOSIS — K76.82 HEPATIC ENCEPHALOPATHY (H): ICD-10-CM

## 2019-07-15 ENCOUNTER — TELEPHONE (OUTPATIENT)
Dept: GASTROENTEROLOGY | Facility: CLINIC | Age: 64
End: 2019-07-15

## 2019-09-04 ENCOUNTER — ANCILLARY PROCEDURE (OUTPATIENT)
Dept: ULTRASOUND IMAGING | Facility: CLINIC | Age: 64
End: 2019-09-04
Attending: INTERNAL MEDICINE
Payer: COMMERCIAL

## 2019-09-04 ENCOUNTER — OFFICE VISIT (OUTPATIENT)
Dept: GASTROENTEROLOGY | Facility: CLINIC | Age: 64
End: 2019-09-04
Attending: INTERNAL MEDICINE
Payer: COMMERCIAL

## 2019-09-04 VITALS
TEMPERATURE: 97.7 F | SYSTOLIC BLOOD PRESSURE: 168 MMHG | HEART RATE: 70 BPM | HEIGHT: 72 IN | WEIGHT: 192.5 LBS | DIASTOLIC BLOOD PRESSURE: 66 MMHG | OXYGEN SATURATION: 97 % | BODY MASS INDEX: 26.07 KG/M2

## 2019-09-04 DIAGNOSIS — K74.60 CIRRHOSIS OF LIVER WITHOUT ASCITES, UNSPECIFIED HEPATIC CIRRHOSIS TYPE (H): ICD-10-CM

## 2019-09-04 DIAGNOSIS — Z95.828 S/P TIPS (TRANSJUGULAR INTRAHEPATIC PORTOSYSTEMIC SHUNT): ICD-10-CM

## 2019-09-04 DIAGNOSIS — K74.60 CIRRHOSIS OF LIVER WITHOUT ASCITES, UNSPECIFIED HEPATIC CIRRHOSIS TYPE (H): Primary | ICD-10-CM

## 2019-09-04 LAB
AFP SERPL-MCNC: 4.8 UG/L (ref 0–8)
ALBUMIN SERPL-MCNC: 3.6 G/DL (ref 3.4–5)
ALP SERPL-CCNC: 227 U/L (ref 40–150)
ALT SERPL W P-5'-P-CCNC: 28 U/L (ref 0–70)
ANION GAP SERPL CALCULATED.3IONS-SCNC: 6 MMOL/L (ref 3–14)
AST SERPL W P-5'-P-CCNC: 40 U/L (ref 0–45)
BILIRUB DIRECT SERPL-MCNC: 0.4 MG/DL (ref 0–0.2)
BILIRUB SERPL-MCNC: 1.5 MG/DL (ref 0.2–1.3)
BUN SERPL-MCNC: 10 MG/DL (ref 7–30)
CALCIUM SERPL-MCNC: 9.4 MG/DL (ref 8.5–10.1)
CHLORIDE SERPL-SCNC: 109 MMOL/L (ref 94–109)
CO2 SERPL-SCNC: 25 MMOL/L (ref 20–32)
CREAT SERPL-MCNC: 0.67 MG/DL (ref 0.66–1.25)
ERYTHROCYTE [DISTWIDTH] IN BLOOD BY AUTOMATED COUNT: 14.6 % (ref 10–15)
GFR SERPL CREATININE-BSD FRML MDRD: >90 ML/MIN/{1.73_M2}
GLUCOSE SERPL-MCNC: 106 MG/DL (ref 70–99)
HCT VFR BLD AUTO: 47.1 % (ref 40–53)
HGB BLD-MCNC: 15.5 G/DL (ref 13.3–17.7)
INR PPP: 1.22 (ref 0.86–1.14)
MCH RBC QN AUTO: 29.8 PG (ref 26.5–33)
MCHC RBC AUTO-ENTMCNC: 32.9 G/DL (ref 31.5–36.5)
MCV RBC AUTO: 90 FL (ref 78–100)
PLATELET # BLD AUTO: 91 10E9/L (ref 150–450)
POTASSIUM SERPL-SCNC: 4.4 MMOL/L (ref 3.4–5.3)
PROT SERPL-MCNC: 7 G/DL (ref 6.8–8.8)
RBC # BLD AUTO: 5.21 10E12/L (ref 4.4–5.9)
SODIUM SERPL-SCNC: 140 MMOL/L (ref 133–144)
WBC # BLD AUTO: 23.5 10E9/L (ref 4–11)

## 2019-09-04 PROCEDURE — 82105 ALPHA-FETOPROTEIN SERUM: CPT | Performed by: INTERNAL MEDICINE

## 2019-09-04 PROCEDURE — 85027 COMPLETE CBC AUTOMATED: CPT | Performed by: INTERNAL MEDICINE

## 2019-09-04 PROCEDURE — 85610 PROTHROMBIN TIME: CPT | Performed by: INTERNAL MEDICINE

## 2019-09-04 PROCEDURE — 80048 BASIC METABOLIC PNL TOTAL CA: CPT | Performed by: INTERNAL MEDICINE

## 2019-09-04 PROCEDURE — G0463 HOSPITAL OUTPT CLINIC VISIT: HCPCS | Mod: ZF

## 2019-09-04 PROCEDURE — 36415 COLL VENOUS BLD VENIPUNCTURE: CPT | Performed by: INTERNAL MEDICINE

## 2019-09-04 PROCEDURE — 80076 HEPATIC FUNCTION PANEL: CPT | Performed by: INTERNAL MEDICINE

## 2019-09-04 ASSESSMENT — PAIN SCALES - GENERAL: PAINLEVEL: NO PAIN (0)

## 2019-09-04 ASSESSMENT — MIFFLIN-ST. JEOR: SCORE: 1703.23

## 2019-09-04 NOTE — NURSING NOTE
"Chief Complaint   Patient presents with     RECHECK     Alcoholic cirrhosis of liver with ascites      BP (!) 168/66   Pulse 70   Temp 97.7  F (36.5  C) (Oral)   Ht 1.816 m (5' 11.5\")   Wt 87.3 kg (192 lb 8 oz)   SpO2 97%   BMI 26.47 kg/m    Deborah Beard, KYAW    "

## 2019-09-04 NOTE — LETTER
"9/4/2019      RE: Mehdi Martinez  [address] [facility] [address]       HISTORY OF PRESENT ILLNESS:  I had the pleasure of seeing Mehdi Martinez for followup in the Liver Clinic at the [facility] on 09/04/2019.  Mr. 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  He is a sustained virologic responder.        He said he is really feeling quite well.  He denies any alcohol.  He denies any fevers or chills.  He denies cough or shortness of breath.  He denies any nausea or vomiting, diarrhea or constipation.  His appetite is good and his weight has been stable.     Current Outpatient Medications   Medication     aMILoride (MIDAMOR) 5 MG tablet     cetirizine (ZYRTEC) 10 MG tablet     fluticasone (FLONASE) 50 MCG/ACT nasal spray     metFORMIN (GLUCOPHAGE) 1000 MG tablet     rifaximin (XIFAXAN) 550 MG TABS tablet     zinc sulfate (ZINCATE) 220 MG capsule     aspirin (ASA) 325 MG tablet     furosemide (LASIX) 40 MG tablet     lactulose (CHRONULAC) 10 GM/15ML solution     Pseudoephedrine-APAP  MG TABS     Rivaroxaban (XARELTO PO)     sulfamethoxazole-trimethoprim (BACTRIM DS) 800-160 MG per tablet     No current facility-administered medications for this visit.      BP (!) 168/66   Pulse 70   Temp 97.7  F (36.5  C) (Oral)   Ht 1.816 m (5' 11.5\")   Wt 87.3 kg (192 lb 8 oz)   SpO2 97%   BMI 26.47 kg/m       PHYSICAL EXAMINATION:  In general, he looks quite well.  HEENT exam shows no scleral icterus or temporal muscle wasting.  Chest is clear.  Abdominal exam shows no increase in girth.  No masses or tenderness on palpation are present.  Liver is 10 cm in span without left lobe enlargement.  No spleen tip is palpable.  Extremity exam shows no edema.  Skin exam shows no stigmata of chronic liver disease.  Neurologic exam shows no asterixis.     Recent Results (from the past 168 hour(s))   INR [[id number]]    Collection Time: 09/04/19 12:04 PM   Result Value " Ref Range    INR 1.22 (H) 0.86 - 1.14   CBC with platelets [[id number]]    Collection Time: 09/04/19 12:04 PM   Result Value Ref Range    WBC 23.5 (H) 4.0 - 11.0 10e9/L    RBC Count 5.21 4.4 - 5.9 10e12/L    Hemoglobin 15.5 13.3 - 17.7 g/dL    Hematocrit 47.1 40.0 - 53.0 %    MCV 90 78 - 100 fl    MCH 29.8 26.5 - 33.0 pg    MCHC 32.9 31.5 - 36.5 g/dL    RDW 14.6 10.0 - 15.0 %    Platelet Count 91 (L) 150 - 450 10e9/L   Basic metabolic panel [LAB15]    Collection Time: 09/04/19 12:04 PM   Result Value Ref Range    Sodium 140 133 - 144 mmol/L    Potassium 4.4 3.4 - 5.3 mmol/L    Chloride 109 94 - 109 mmol/L    Carbon Dioxide 25 20 - 32 mmol/L    Anion Gap 6 3 - 14 mmol/L    Glucose 106 (H) 70 - 99 mg/dL    Urea Nitrogen 10 7 - 30 mg/dL    Creatinine 0.67 0.66 - 1.25 mg/dL    GFR Estimate >90 >60 mL/min/[1.73_m2]    GFR Estimate If Black >90 >60 mL/min/[1.73_m2]    Calcium 9.4 8.5 - 10.1 mg/dL   Hepatic Panel [LAB20]    Collection Time: 09/04/19 12:04 PM   Result Value Ref Range    Bilirubin Direct 0.4 (H) 0.0 - 0.2 mg/dL    Bilirubin Total 1.5 (H) 0.2 - 1.3 mg/dL    Albumin 3.6 3.4 - 5.0 g/dL    Protein Total 7.0 6.8 - 8.8 g/dL    Alkaline Phosphatase 227 (H) 40 - 150 U/L    ALT 28 0 - 70 U/L    AST 40 0 - 45 U/L      He did have an ultrasound today that showed a stable hypoechoic lesion in the liver that has looked previously like a cyst.  His TIPS flow velocities are excellent and he has no ascites.      IMPRESSION:  My impression is Mr. Martinez is doing well.  His cirrhosis is very well compensated at this point in time.  He just has a mild trans He has an elevated bilirubin.   All complications are currently well addressed, and he will return in 6 months with an US, labs and a clinic visit.  He is up to date with regard to vaccines and cancer screening.      Finally, he does have CLL which appears to be stable.  He does follow with a hematologist-oncologist.     Thank you very much for allowing me to participate in  this patient's care.  If you have any questions regarding my recommendations, please contact me.       Juancho Willard MD      Professor of Medicine  [employer]      Executive Medical Director, Solid Organ Transplant Program  [facility]    Juancho Willard MD

## 2019-09-04 NOTE — PROGRESS NOTES
"HISTORY OF PRESENT ILLNESS:  I had the pleasure of seeing Mehdi Martinez for followup in the Liver Clinic at the [facility] on 09/04/2019.  Mr. 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  He is a sustained virologic responder.        He said he is really feeling quite well.  He denies any alcohol.  He denies any fevers or chills.  He denies cough or shortness of breath.  He denies any nausea or vomiting, diarrhea or constipation.  His appetite is good and his weight has been stable.     Current Outpatient Medications   Medication     aMILoride (MIDAMOR) 5 MG tablet     cetirizine (ZYRTEC) 10 MG tablet     fluticasone (FLONASE) 50 MCG/ACT nasal spray     metFORMIN (GLUCOPHAGE) 1000 MG tablet     rifaximin (XIFAXAN) 550 MG TABS tablet     zinc sulfate (ZINCATE) 220 MG capsule     aspirin (ASA) 325 MG tablet     furosemide (LASIX) 40 MG tablet     lactulose (CHRONULAC) 10 GM/15ML solution     Pseudoephedrine-APAP  MG TABS     Rivaroxaban (XARELTO PO)     sulfamethoxazole-trimethoprim (BACTRIM DS) 800-160 MG per tablet     No current facility-administered medications for this visit.      BP (!) 168/66   Pulse 70   Temp 97.7  F (36.5  C) (Oral)   Ht 1.816 m (5' 11.5\")   Wt 87.3 kg (192 lb 8 oz)   SpO2 97%   BMI 26.47 kg/m      PHYSICAL EXAMINATION:  In general, he looks quite well.  HEENT exam shows no scleral icterus or temporal muscle wasting.  Chest is clear.  Abdominal exam shows no increase in girth.  No masses or tenderness on palpation are present.  Liver is 10 cm in span without left lobe enlargement.  No spleen tip is palpable.  Extremity exam shows no edema.  Skin exam shows no stigmata of chronic liver disease.  Neurologic exam shows no asterixis.     Recent Results (from the past 168 hour(s))   INR [[id number]]    Collection Time: 09/04/19 12:04 PM   Result Value Ref Range    INR 1.22 (H) 0.86 - 1.14   CBC with platelets [[id number]]    Collection " Time: 09/04/19 12:04 PM   Result Value Ref Range    WBC 23.5 (H) 4.0 - 11.0 10e9/L    RBC Count 5.21 4.4 - 5.9 10e12/L    Hemoglobin 15.5 13.3 - 17.7 g/dL    Hematocrit 47.1 40.0 - 53.0 %    MCV 90 78 - 100 fl    MCH 29.8 26.5 - 33.0 pg    MCHC 32.9 31.5 - 36.5 g/dL    RDW 14.6 10.0 - 15.0 %    Platelet Count 91 (L) 150 - 450 10e9/L   Basic metabolic panel [LAB15]    Collection Time: 09/04/19 12:04 PM   Result Value Ref Range    Sodium 140 133 - 144 mmol/L    Potassium 4.4 3.4 - 5.3 mmol/L    Chloride 109 94 - 109 mmol/L    Carbon Dioxide 25 20 - 32 mmol/L    Anion Gap 6 3 - 14 mmol/L    Glucose 106 (H) 70 - 99 mg/dL    Urea Nitrogen 10 7 - 30 mg/dL    Creatinine 0.67 0.66 - 1.25 mg/dL    GFR Estimate >90 >60 mL/min/[1.73_m2]    GFR Estimate If Black >90 >60 mL/min/[1.73_m2]    Calcium 9.4 8.5 - 10.1 mg/dL   Hepatic Panel [LAB20]    Collection Time: 09/04/19 12:04 PM   Result Value Ref Range    Bilirubin Direct 0.4 (H) 0.0 - 0.2 mg/dL    Bilirubin Total 1.5 (H) 0.2 - 1.3 mg/dL    Albumin 3.6 3.4 - 5.0 g/dL    Protein Total 7.0 6.8 - 8.8 g/dL    Alkaline Phosphatase 227 (H) 40 - 150 U/L    ALT 28 0 - 70 U/L    AST 40 0 - 45 U/L      He did have an ultrasound today that showed a stable hypoechoic lesion in the liver that has looked previously like a cyst.  His TIPS flow velocities are excellent and he has no ascites.      IMPRESSION:  My impression is Mr. Martinez is doing well.  His cirrhosis is very well compensated at this point in time.  He just has a mild trans He has an elevated bilirubin.   All complications are currently well addressed, and he will return in 6 months with an US, labs and a clinic visit.  He is up to date with regard to vaccines and cancer screening.      Finally, he does have CLL which appears to be stable.  He does follow with a hematologist-oncologist.     Thank you very much for allowing me to participate in this patient's care.  If you have any questions regarding my recommendations, please  contact me.       Juancho Willard MD      Professor of Medicine  [facility] [employer]      Executive Medical Director, Solid Organ Transplant Program  [facility]

## 2019-12-02 ENCOUNTER — TRANSFERRED RECORDS (OUTPATIENT)
Dept: HEALTH INFORMATION MANAGEMENT | Facility: CLINIC | Age: 64
End: 2019-12-02

## 2019-12-04 ENCOUNTER — TRANSFERRED RECORDS (OUTPATIENT)
Dept: HEALTH INFORMATION MANAGEMENT | Facility: CLINIC | Age: 64
End: 2019-12-04

## 2020-02-26 ENCOUNTER — TELEPHONE (OUTPATIENT)
Dept: GASTROENTEROLOGY | Facility: CLINIC | Age: 65
End: 2020-02-26

## 2020-02-26 NOTE — TELEPHONE ENCOUNTER
LVM with input per Dr. Willard as stated below.    Morena Crowley LPN  Hepatology Clinic      ----- Message from Juancho Willard MD sent at 2/25/2020  3:24 PM CST -----  Regarding: RE: Methotrexate  Should be fine.  ----- Message -----  From: Morena Crowley LPN  Sent: 2/25/2020  12:33 PM CST  To: Juancho Willard MD  Subject: Methotrexate                                     Pt just wanted to update you that he was started on Methotrexate 2.5 mg (6 tabs) once per week for RA and Folic Acid 1 mg daily. Let me know if you have any concerns to pass along to the patient?    Next appt 3/13/20

## 2020-03-10 ENCOUNTER — TELEPHONE (OUTPATIENT)
Dept: GASTROENTEROLOGY | Facility: CLINIC | Age: 65
End: 2020-03-10

## 2020-03-13 ENCOUNTER — ANCILLARY PROCEDURE (OUTPATIENT)
Dept: ULTRASOUND IMAGING | Facility: CLINIC | Age: 65
End: 2020-03-13
Attending: INTERNAL MEDICINE

## 2020-03-13 ENCOUNTER — OFFICE VISIT (OUTPATIENT)
Dept: GASTROENTEROLOGY | Facility: CLINIC | Age: 65
End: 2020-03-13
Attending: INTERNAL MEDICINE
Payer: COMMERCIAL

## 2020-03-13 VITALS
OXYGEN SATURATION: 95 % | DIASTOLIC BLOOD PRESSURE: 78 MMHG | HEART RATE: 73 BPM | TEMPERATURE: 97.8 F | SYSTOLIC BLOOD PRESSURE: 137 MMHG | BODY MASS INDEX: 28.12 KG/M2 | WEIGHT: 204.5 LBS

## 2020-03-13 DIAGNOSIS — Z95.828 S/P TIPS (TRANSJUGULAR INTRAHEPATIC PORTOSYSTEMIC SHUNT): ICD-10-CM

## 2020-03-13 DIAGNOSIS — K74.60 CIRRHOSIS OF LIVER WITHOUT ASCITES, UNSPECIFIED HEPATIC CIRRHOSIS TYPE (H): Primary | ICD-10-CM

## 2020-03-13 DIAGNOSIS — K74.60 CIRRHOSIS OF LIVER WITHOUT ASCITES, UNSPECIFIED HEPATIC CIRRHOSIS TYPE (H): ICD-10-CM

## 2020-03-13 LAB
AFP SERPL-MCNC: 4.2 UG/L (ref 0–8)
ALBUMIN SERPL-MCNC: 3.5 G/DL (ref 3.4–5)
ALP SERPL-CCNC: 210 U/L (ref 40–150)
ALT SERPL W P-5'-P-CCNC: 33 U/L (ref 0–70)
ANION GAP SERPL CALCULATED.3IONS-SCNC: 4 MMOL/L (ref 3–14)
AST SERPL W P-5'-P-CCNC: 41 U/L (ref 0–45)
BILIRUB DIRECT SERPL-MCNC: 0.4 MG/DL (ref 0–0.2)
BILIRUB SERPL-MCNC: 1.2 MG/DL (ref 0.2–1.3)
BUN SERPL-MCNC: 10 MG/DL (ref 7–30)
CALCIUM SERPL-MCNC: 9.1 MG/DL (ref 8.5–10.1)
CHLORIDE SERPL-SCNC: 112 MMOL/L (ref 94–109)
CO2 SERPL-SCNC: 26 MMOL/L (ref 20–32)
CREAT SERPL-MCNC: 0.72 MG/DL (ref 0.66–1.25)
ERYTHROCYTE [DISTWIDTH] IN BLOOD BY AUTOMATED COUNT: 14.6 % (ref 10–15)
GFR SERPL CREATININE-BSD FRML MDRD: >90 ML/MIN/{1.73_M2}
GLUCOSE SERPL-MCNC: 127 MG/DL (ref 70–99)
HCT VFR BLD AUTO: 47.3 % (ref 40–53)
HGB BLD-MCNC: 15.3 G/DL (ref 13.3–17.7)
INR PPP: 1.15 (ref 0.86–1.14)
MCH RBC QN AUTO: 29.8 PG (ref 26.5–33)
MCHC RBC AUTO-ENTMCNC: 32.3 G/DL (ref 31.5–36.5)
MCV RBC AUTO: 92 FL (ref 78–100)
PLATELET # BLD AUTO: 115 10E9/L (ref 150–450)
POTASSIUM SERPL-SCNC: 4.4 MMOL/L (ref 3.4–5.3)
PROT SERPL-MCNC: 6.3 G/DL (ref 6.8–8.8)
RBC # BLD AUTO: 5.14 10E12/L (ref 4.4–5.9)
SODIUM SERPL-SCNC: 142 MMOL/L (ref 133–144)
WBC # BLD AUTO: 25.3 10E9/L (ref 4–11)

## 2020-03-13 PROCEDURE — 80076 HEPATIC FUNCTION PANEL: CPT | Performed by: INTERNAL MEDICINE

## 2020-03-13 PROCEDURE — G0463 HOSPITAL OUTPT CLINIC VISIT: HCPCS | Mod: ZF

## 2020-03-13 PROCEDURE — 36415 COLL VENOUS BLD VENIPUNCTURE: CPT | Performed by: INTERNAL MEDICINE

## 2020-03-13 PROCEDURE — 82105 ALPHA-FETOPROTEIN SERUM: CPT | Performed by: INTERNAL MEDICINE

## 2020-03-13 PROCEDURE — 80048 BASIC METABOLIC PNL TOTAL CA: CPT | Performed by: INTERNAL MEDICINE

## 2020-03-13 PROCEDURE — 85027 COMPLETE CBC AUTOMATED: CPT | Performed by: INTERNAL MEDICINE

## 2020-03-13 PROCEDURE — 85610 PROTHROMBIN TIME: CPT | Performed by: INTERNAL MEDICINE

## 2020-03-13 RX ORDER — FOLIC ACID 1 MG/1
1 TABLET ORAL EVERY MORNING
COMMUNITY
Start: 2020-02-26

## 2020-03-13 ASSESSMENT — PAIN SCALES - GENERAL: PAINLEVEL: NO PAIN (0)

## 2020-03-13 NOTE — PROGRESS NOTES
I had the pleasure of seeing Mehdi Martinez for followup in the Liver Clinic at the [facility] on 03/13/2020.  Mr. 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  He is status post TIPS for diuretic refractory ascites.      He is doing actually quite well at this visit.  He does complain of some abdominal muscle tenderness.  He has been quite active around the yard and attributes his muscle discomfort to that.  He denies any itching or skin rash and has mild fatigue.  He denies any increased abdominal girth or lower extremity edema.      He denies any fevers or chills, cough or shortness of breath.  He denies any nausea or vomiting, diarrhea or constipation.  His appetite has been good, and his weight is up about 13 pounds over the past year.      He also does have CLL, for which he is seeing an oncologist at [facility].     Current Outpatient Medications   Medication     aMILoride (MIDAMOR) 5 MG tablet     metFORMIN (GLUCOPHAGE) 1000 MG tablet     methotrexate 2.5 MG tablet     rifaximin (XIFAXAN) 550 MG TABS tablet     zinc sulfate (ZINCATE) 220 MG capsule     aspirin (ASA) 325 MG tablet     cetirizine (ZYRTEC) 10 MG tablet     fluticasone (FLONASE) 50 MCG/ACT nasal spray     folic acid (FOLVITE) 1 MG tablet     furosemide (LASIX) 40 MG tablet     lactulose (CHRONULAC) 10 GM/15ML solution     Pseudoephedrine-APAP  MG TABS     Rivaroxaban (XARELTO PO)     sulfamethoxazole-trimethoprim (BACTRIM DS) 800-160 MG per tablet     No current facility-administered medications for this visit.      /78 (BP Location: Right arm, Patient Position: Sitting, Cuff Size: Adult Regular)   Pulse 73   Temp 97.8  F (36.6  C)   Wt 92.8 kg (204 lb 8 oz)   SpO2 95%   BMI 28.12 kg/m      PHYSICAL EXAMINATION:  In general, he looks well.  HEENT exam shows no scleral icterus or temporal muscle wasting.  His chest is clear.  His abdominal exam shows no  increase in girth.  No masses or tenderness to palpation is present.  His liver is 10 cm in span without left lobe enlargement.  No spleen tip is palpable.  Extremity exam shows no edema.  Skin exam shows no stigmata of chronic liver disease.  Neurologic exam shows no asterixis.     Recent Results (from the past 168 hour(s))   INR [[id number]]    Collection Time: 03/13/20  6:51 AM   Result Value Ref Range    INR 1.15 (H) 0.86 - 1.14   CBC with platelets [[id number]]    Collection Time: 03/13/20  6:51 AM   Result Value Ref Range    WBC 25.3 (H) 4.0 - 11.0 10e9/L    RBC Count 5.14 4.4 - 5.9 10e12/L    Hemoglobin 15.3 13.3 - 17.7 g/dL    Hematocrit 47.3 40.0 - 53.0 %    MCV 92 78 - 100 fl    MCH 29.8 26.5 - 33.0 pg    MCHC 32.3 31.5 - 36.5 g/dL    RDW 14.6 10.0 - 15.0 %    Platelet Count 115 (L) 150 - 450 10e9/L   Basic metabolic panel [LAB15]    Collection Time: 03/13/20  6:51 AM   Result Value Ref Range    Sodium 142 133 - 144 mmol/L    Potassium 4.4 3.4 - 5.3 mmol/L    Chloride 112 (H) 94 - 109 mmol/L    Carbon Dioxide 26 20 - 32 mmol/L    Anion Gap 4 3 - 14 mmol/L    Glucose 127 (H) 70 - 99 mg/dL    Urea Nitrogen 10 7 - 30 mg/dL    Creatinine 0.72 0.66 - 1.25 mg/dL    GFR Estimate >90 >60 mL/min/[1.73_m2]    GFR Estimate If Black >90 >60 mL/min/[1.73_m2]    Calcium 9.1 8.5 - 10.1 mg/dL   Hepatic Panel [LAB20]    Collection Time: 03/13/20  6:51 AM   Result Value Ref Range    Bilirubin Direct 0.4 (H) 0.0 - 0.2 mg/dL    Bilirubin Total 1.2 0.2 - 1.3 mg/dL    Albumin 3.5 3.4 - 5.0 g/dL    Protein Total 6.3 (L) 6.8 - 8.8 g/dL    Alkaline Phosphatase 210 (H) 40 - 150 U/L    ALT 33 0 - 70 U/L    AST 41 0 - 45 U/L      IMAGING:  I did review his abdominal ultrasound which shows good TIPS flow velocities.  No ascites is seen.  There are no mass lesions and no new abnormalities were seen.      IMPRESSION:  My impression is that Mr. 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  He is a sustained virologic  responder to hepatitis C treatment and is doing well.  I will not be making any change to his medical regimen as all complications are well addressed.  I did recommend he work on keeping his weight down.  I will see him back in the clinic in 6 months for repeat imaging and blood work.      Thank you very much for allowing me to participate in the care of this patient.  If you have any questions regarding my recommendations, please do not hesitate to contact me.        Juancho Willard MD      Professor of Medicine  [employer] [facility] [employer]      Executive Medical Director, Solid Organ Transplant Program  [facility]

## 2020-03-13 NOTE — LETTER
3/13/2020       RE: Mehdi Martinez  [address] [facility] [address]     Dear Colleague,    Thank you for referring your patient, Mehdi Martinez, to the [facility] HEPATOLOGY at [facility]. Please see a copy of my visit note below.    I had the pleasure of seeing Mehdi Martinez for followup in the Liver Clinic at the [facility] on 03/13/2020.  Mr. 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  He is status post TIPS for diuretic refractory ascites.      He is doing actually quite well at this visit.  He does complain of some abdominal muscle tenderness.  He has been quite active around the yard and attributes his muscle discomfort to that.  He denies any itching or skin rash and has mild fatigue.  He denies any increased abdominal girth or lower extremity edema.      He denies any fevers or chills, cough or shortness of breath.  He denies any nausea or vomiting, diarrhea or constipation.  His appetite has been good, and his weight is up about 13 pounds over the past year.      He also does have CLL, for which he is seeing an oncologist at [facility].     Current Outpatient Medications   Medication     aMILoride (MIDAMOR) 5 MG tablet     metFORMIN (GLUCOPHAGE) 1000 MG tablet     methotrexate 2.5 MG tablet     rifaximin (XIFAXAN) 550 MG TABS tablet     zinc sulfate (ZINCATE) 220 MG capsule     aspirin (ASA) 325 MG tablet     cetirizine (ZYRTEC) 10 MG tablet     fluticasone (FLONASE) 50 MCG/ACT nasal spray     folic acid (FOLVITE) 1 MG tablet     furosemide (LASIX) 40 MG tablet     lactulose (CHRONULAC) 10 GM/15ML solution     Pseudoephedrine-APAP  MG TABS     Rivaroxaban (XARELTO PO)     sulfamethoxazole-trimethoprim (BACTRIM DS) 800-160 MG per tablet     No current facility-administered medications for this visit.      /78 (BP Location: Right arm, Patient Position: Sitting, Cuff Size:  Adult Regular)   Pulse 73   Temp 97.8  F (36.6  C)   Wt 92.8 kg (204 lb 8 oz)   SpO2 95%   BMI 28.12 kg/m      PHYSICAL EXAMINATION:  In general, he looks well.  HEENT exam shows no scleral icterus or temporal muscle wasting.  His chest is clear.  His abdominal exam shows no increase in girth.  No masses or tenderness to palpation is present.  His liver is 10 cm in span without left lobe enlargement.  No spleen tip is palpable.  Extremity exam shows no edema.  Skin exam shows no stigmata of chronic liver disease.  Neurologic exam shows no asterixis.     Recent Results (from the past 168 hour(s))   INR [[id number]]    Collection Time: 03/13/20  6:51 AM   Result Value Ref Range    INR 1.15 (H) 0.86 - 1.14   CBC with platelets [[id number]]    Collection Time: 03/13/20  6:51 AM   Result Value Ref Range    WBC 25.3 (H) 4.0 - 11.0 10e9/L    RBC Count 5.14 4.4 - 5.9 10e12/L    Hemoglobin 15.3 13.3 - 17.7 g/dL    Hematocrit 47.3 40.0 - 53.0 %    MCV 92 78 - 100 fl    MCH 29.8 26.5 - 33.0 pg    MCHC 32.3 31.5 - 36.5 g/dL    RDW 14.6 10.0 - 15.0 %    Platelet Count 115 (L) 150 - 450 10e9/L   Basic metabolic panel [LAB15]    Collection Time: 03/13/20  6:51 AM   Result Value Ref Range    Sodium 142 133 - 144 mmol/L    Potassium 4.4 3.4 - 5.3 mmol/L    Chloride 112 (H) 94 - 109 mmol/L    Carbon Dioxide 26 20 - 32 mmol/L    Anion Gap 4 3 - 14 mmol/L    Glucose 127 (H) 70 - 99 mg/dL    Urea Nitrogen 10 7 - 30 mg/dL    Creatinine 0.72 0.66 - 1.25 mg/dL    GFR Estimate >90 >60 mL/min/[1.73_m2]    GFR Estimate If Black >90 >60 mL/min/[1.73_m2]    Calcium 9.1 8.5 - 10.1 mg/dL   Hepatic Panel [LAB20]    Collection Time: 03/13/20  6:51 AM   Result Value Ref Range    Bilirubin Direct 0.4 (H) 0.0 - 0.2 mg/dL    Bilirubin Total 1.2 0.2 - 1.3 mg/dL    Albumin 3.5 3.4 - 5.0 g/dL    Protein Total 6.3 (L) 6.8 - 8.8 g/dL    Alkaline Phosphatase 210 (H) 40 - 150 U/L    ALT 33 0 - 70 U/L    AST 41 0 - 45 U/L      IMAGING:  I did review his  abdominal ultrasound which shows good TIPS flow velocities.  No ascites is seen.  There are no mass lesions and no new abnormalities were seen.      IMPRESSION:  My impression is that Mr. 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  He is a sustained virologic responder to hepatitis C treatment and is doing well.  I will not be making any change to his medical regimen as all complications are well addressed.  I did recommend he work on keeping his weight down.  I will see him back in the clinic in 6 months for repeat imaging and blood work.      Thank you very much for allowing me to participate in the care of this patient.  If you have any questions regarding my recommendations, please do not hesitate to contact me.        Juancho Willard MD      Professor of Medicine  [employer]      Executive Medical Director, Solid Organ Transplant Program  [facility]

## 2020-03-13 NOTE — LETTER
3/13/2020      RE: Mehdi Martinez  [address] [facility] [address]       I had the pleasure of seeing Mehdi Martinez for followup in the Liver Clinic at the [facility] on 03/13/2020.  Mr. 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  He is status post TIPS for diuretic refractory ascites.      He is doing actually quite well at this visit.  He does complain of some abdominal muscle tenderness.  He has been quite active around the yard and attributes his muscle discomfort to that.  He denies any itching or skin rash and has mild fatigue.  He denies any increased abdominal girth or lower extremity edema.      He denies any fevers or chills, cough or shortness of breath.  He denies any nausea or vomiting, diarrhea or constipation.  His appetite has been good, and his weight is up about 13 pounds over the past year.      He also does have CLL, for which he is seeing an oncologist at [facility].     Current Outpatient Medications   Medication     aMILoride (MIDAMOR) 5 MG tablet     metFORMIN (GLUCOPHAGE) 1000 MG tablet     methotrexate 2.5 MG tablet     rifaximin (XIFAXAN) 550 MG TABS tablet     zinc sulfate (ZINCATE) 220 MG capsule     aspirin (ASA) 325 MG tablet     cetirizine (ZYRTEC) 10 MG tablet     fluticasone (FLONASE) 50 MCG/ACT nasal spray     folic acid (FOLVITE) 1 MG tablet     furosemide (LASIX) 40 MG tablet     lactulose (CHRONULAC) 10 GM/15ML solution     Pseudoephedrine-APAP  MG TABS     Rivaroxaban (XARELTO PO)     sulfamethoxazole-trimethoprim (BACTRIM DS) 800-160 MG per tablet     No current facility-administered medications for this visit.      /78 (BP Location: Right arm, Patient Position: Sitting, Cuff Size: Adult Regular)   Pulse 73   Temp 97.8  F (36.6  C)   Wt 92.8 kg (204 lb 8 oz)   SpO2 95%   BMI 28.12 kg/m      PHYSICAL EXAMINATION:  In general, he looks well.  HEENT exam shows no scleral  icterus or temporal muscle wasting.  His chest is clear.  His abdominal exam shows no increase in girth.  No masses or tenderness to palpation is present.  His liver is 10 cm in span without left lobe enlargement.  No spleen tip is palpable.  Extremity exam shows no edema.  Skin exam shows no stigmata of chronic liver disease.  Neurologic exam shows no asterixis.     Recent Results (from the past 168 hour(s))   INR [[id number]]    Collection Time: 03/13/20  6:51 AM   Result Value Ref Range    INR 1.15 (H) 0.86 - 1.14   CBC with platelets [[id number]]    Collection Time: 03/13/20  6:51 AM   Result Value Ref Range    WBC 25.3 (H) 4.0 - 11.0 10e9/L    RBC Count 5.14 4.4 - 5.9 10e12/L    Hemoglobin 15.3 13.3 - 17.7 g/dL    Hematocrit 47.3 40.0 - 53.0 %    MCV 92 78 - 100 fl    MCH 29.8 26.5 - 33.0 pg    MCHC 32.3 31.5 - 36.5 g/dL    RDW 14.6 10.0 - 15.0 %    Platelet Count 115 (L) 150 - 450 10e9/L   Basic metabolic panel [LAB15]    Collection Time: 03/13/20  6:51 AM   Result Value Ref Range    Sodium 142 133 - 144 mmol/L    Potassium 4.4 3.4 - 5.3 mmol/L    Chloride 112 (H) 94 - 109 mmol/L    Carbon Dioxide 26 20 - 32 mmol/L    Anion Gap 4 3 - 14 mmol/L    Glucose 127 (H) 70 - 99 mg/dL    Urea Nitrogen 10 7 - 30 mg/dL    Creatinine 0.72 0.66 - 1.25 mg/dL    GFR Estimate >90 >60 mL/min/[1.73_m2]    GFR Estimate If Black >90 >60 mL/min/[1.73_m2]    Calcium 9.1 8.5 - 10.1 mg/dL   Hepatic Panel [LAB20]    Collection Time: 03/13/20  6:51 AM   Result Value Ref Range    Bilirubin Direct 0.4 (H) 0.0 - 0.2 mg/dL    Bilirubin Total 1.2 0.2 - 1.3 mg/dL    Albumin 3.5 3.4 - 5.0 g/dL    Protein Total 6.3 (L) 6.8 - 8.8 g/dL    Alkaline Phosphatase 210 (H) 40 - 150 U/L    ALT 33 0 - 70 U/L    AST 41 0 - 45 U/L      IMAGING:  I did review his abdominal ultrasound which shows good TIPS flow velocities.  No ascites is seen.  There are no mass lesions and no new abnormalities were seen.      IMPRESSION:  My impression is that Mr. 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  He is a sustained virologic responder to hepatitis C treatment and is doing well.  I will not be making any change to his medical regimen as all complications are well addressed.  I did recommend he work on keeping his weight down.  I will see him back in the clinic in 6 months for repeat imaging and blood work.      Thank you very much for allowing me to participate in the care of this patient.  If you have any questions regarding my recommendations, please do not hesitate to contact me.        Juancho Willard MD      Professor of Medicine  [employer]      Executive Medical Director, Solid Organ Transplant Program  [facility]

## 2020-03-13 NOTE — NURSING NOTE
rfv  /78 (BP Location: Right arm, Patient Position: Sitting, Cuff Size: Adult Regular)   Pulse 73   Temp 97.8  F (36.6  C)   Wt 92.8 kg (204 lb 8 oz)   SpO2 95%   BMI 28.12 kg/m        Alfonso Mckinney, EMT

## 2020-04-30 DIAGNOSIS — K76.82 HEPATIC ENCEPHALOPATHY (H): ICD-10-CM

## 2020-06-03 ENCOUNTER — TRANSFERRED RECORDS (OUTPATIENT)
Dept: HEALTH INFORMATION MANAGEMENT | Facility: CLINIC | Age: 65
End: 2020-06-03

## 2020-09-18 ENCOUNTER — ANCILLARY PROCEDURE (OUTPATIENT)
Dept: ULTRASOUND IMAGING | Facility: CLINIC | Age: 65
End: 2020-09-18
Attending: INTERNAL MEDICINE

## 2020-09-18 ENCOUNTER — OFFICE VISIT (OUTPATIENT)
Dept: GASTROENTEROLOGY | Facility: CLINIC | Age: 65
End: 2020-09-18
Attending: INTERNAL MEDICINE
Payer: COMMERCIAL

## 2020-09-18 VITALS
TEMPERATURE: 97.7 F | DIASTOLIC BLOOD PRESSURE: 81 MMHG | WEIGHT: 201 LBS | SYSTOLIC BLOOD PRESSURE: 165 MMHG | OXYGEN SATURATION: 97 % | HEART RATE: 74 BPM | BODY MASS INDEX: 27.64 KG/M2

## 2020-09-18 DIAGNOSIS — K74.60 CIRRHOSIS OF LIVER WITHOUT ASCITES, UNSPECIFIED HEPATIC CIRRHOSIS TYPE (H): ICD-10-CM

## 2020-09-18 DIAGNOSIS — Z23 NEED FOR INFLUENZA VACCINATION: ICD-10-CM

## 2020-09-18 DIAGNOSIS — K74.60 CIRRHOSIS OF LIVER WITHOUT ASCITES, UNSPECIFIED HEPATIC CIRRHOSIS TYPE (H): Primary | ICD-10-CM

## 2020-09-18 LAB
AFP SERPL-MCNC: 3.3 UG/L (ref 0–8)
ALBUMIN SERPL-MCNC: 3.3 G/DL (ref 3.4–5)
ALP SERPL-CCNC: 197 U/L (ref 40–150)
ALT SERPL W P-5'-P-CCNC: 66 U/L (ref 0–70)
ANION GAP SERPL CALCULATED.3IONS-SCNC: 6 MMOL/L (ref 3–14)
AST SERPL W P-5'-P-CCNC: 60 U/L (ref 0–45)
BILIRUB DIRECT SERPL-MCNC: 0.5 MG/DL (ref 0–0.2)
BILIRUB SERPL-MCNC: 1.9 MG/DL (ref 0.2–1.3)
BUN SERPL-MCNC: 9 MG/DL (ref 7–30)
CALCIUM SERPL-MCNC: 9.2 MG/DL (ref 8.5–10.1)
CHLORIDE SERPL-SCNC: 111 MMOL/L (ref 94–109)
CO2 SERPL-SCNC: 28 MMOL/L (ref 20–32)
CREAT SERPL-MCNC: 0.71 MG/DL (ref 0.66–1.25)
ERYTHROCYTE [DISTWIDTH] IN BLOOD BY AUTOMATED COUNT: 14.8 % (ref 10–15)
GFR SERPL CREATININE-BSD FRML MDRD: >90 ML/MIN/{1.73_M2}
GLUCOSE SERPL-MCNC: 168 MG/DL (ref 70–99)
HCT VFR BLD AUTO: 46.7 % (ref 40–53)
HGB BLD-MCNC: 15.4 G/DL (ref 13.3–17.7)
INR PPP: 1.26 (ref 0.86–1.14)
MCH RBC QN AUTO: 31.7 PG (ref 26.5–33)
MCHC RBC AUTO-ENTMCNC: 33 G/DL (ref 31.5–36.5)
MCV RBC AUTO: 96 FL (ref 78–100)
PLATELET # BLD AUTO: 96 10E9/L (ref 150–450)
POTASSIUM SERPL-SCNC: 4.4 MMOL/L (ref 3.4–5.3)
PROT SERPL-MCNC: 6.2 G/DL (ref 6.8–8.8)
RBC # BLD AUTO: 4.86 10E12/L (ref 4.4–5.9)
SODIUM SERPL-SCNC: 144 MMOL/L (ref 133–144)
WBC # BLD AUTO: 26.9 10E9/L (ref 4–11)

## 2020-09-18 PROCEDURE — 82105 ALPHA-FETOPROTEIN SERUM: CPT | Performed by: INTERNAL MEDICINE

## 2020-09-18 PROCEDURE — 85610 PROTHROMBIN TIME: CPT | Performed by: INTERNAL MEDICINE

## 2020-09-18 PROCEDURE — 85027 COMPLETE CBC AUTOMATED: CPT | Performed by: INTERNAL MEDICINE

## 2020-09-18 PROCEDURE — 36415 COLL VENOUS BLD VENIPUNCTURE: CPT | Performed by: INTERNAL MEDICINE

## 2020-09-18 PROCEDURE — G0008 ADMIN INFLUENZA VIRUS VAC: HCPCS | Mod: ZF

## 2020-09-18 PROCEDURE — 80048 BASIC METABOLIC PNL TOTAL CA: CPT | Performed by: INTERNAL MEDICINE

## 2020-09-18 PROCEDURE — 90682 RIV4 VACC RECOMBINANT DNA IM: CPT | Mod: ZF | Performed by: INTERNAL MEDICINE

## 2020-09-18 PROCEDURE — G0463 HOSPITAL OUTPT CLINIC VISIT: HCPCS | Mod: 25,ZF

## 2020-09-18 PROCEDURE — 80076 HEPATIC FUNCTION PANEL: CPT | Performed by: INTERNAL MEDICINE

## 2020-09-18 PROCEDURE — 25000128 H RX IP 250 OP 636: Mod: ZF | Performed by: INTERNAL MEDICINE

## 2020-09-18 RX ADMIN — INFLUENZA A VIRUS A/HAWAII/70/2019 (H1N1) RECOMBINANT HEMAGGLUTININ ANTIGEN, INFLUENZA A VIRUS A/MINNESOTA/41/2019 (H3N2) RECOMBINANT HEMAGGLUTININ ANTIGEN, INFLUENZA B VIRUS B/WASHINGTON/02/2019 RECOMBINANT HEMAGGLUTININ ANTIGEN, AND INFLUENZA B VIRUS B/PHUKET/3073/2013 RECOMBINANT HEMAGGLUTININ ANTIGEN 0.5 ML: 45; 45; 45; 45 INJECTION INTRAMUSCULAR at 08:08

## 2020-09-18 ASSESSMENT — PAIN SCALES - GENERAL: PAINLEVEL: NO PAIN (0)

## 2020-09-18 NOTE — LETTER
9/18/2020         RE: Mehdi Martinez  [address] [facility] [address]      I had the pleasure of seeing Mehdi Martinez for followup in the Liver Clinic at the [facility] on 09/18/2020.  Mr. 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  He is a sustained virologic responder to hepatitis C treatment.      He is doing well at this visit.  He denies any abdominal pain, itching or skin rash, although he did have an episode of tinea capitis a while back.  He has moderate fatigue.  He denies any increased abdominal girth or lower extremity edema.  He has not had any gastrointestinal bleeding or any overt signs of hepatic encephalopathy.      He denies any fevers or chills, cough or shortness of breath.  He denies any nausea or vomiting, diarrhea or constipation.  His appetite has been good.  His weight is actually up significantly.      He does report that his diabetes has been under good control.     Current Outpatient Medications   Medication     aMILoride (MIDAMOR) 5 MG tablet     cetirizine (ZYRTEC) 10 MG tablet     fluticasone (FLONASE) 50 MCG/ACT nasal spray     folic acid (FOLVITE) 1 MG tablet     metFORMIN (GLUCOPHAGE) 1000 MG tablet     methotrexate 2.5 MG tablet     Pseudoephedrine-APAP  MG TABS     rifaximin (XIFAXAN) 550 MG TABS tablet     Rivaroxaban (XARELTO PO)     zinc sulfate (ZINCATE) 220 MG capsule     aspirin (ASA) 325 MG tablet     furosemide (LASIX) 40 MG tablet     lactulose (CHRONULAC) 10 GM/15ML solution     sulfamethoxazole-trimethoprim (BACTRIM DS) 800-160 MG per tablet     No current facility-administered medications for this visit.      BP (!) 165/81 (BP Location: Right arm, Patient Position: Sitting)   Pulse 74   Temp 97.7  F (36.5  C)   Wt 91.2 kg (201 lb)   SpO2 97%   BMI 27.64 kg/m      PHYSICAL EXAMINATION:  In general, he looks quite well.  HEENT exam shows no scleral icterus or temporal  muscle wasting.  Chest is clear.  Abdominal exam shows no increase in girth.  No masses or tenderness to palpation are present.  His liver is 10 cm in span without left lobe enlargement.  No spleen tip is palpable, and extremity exam shows no edema.  Skin exam shows no stigmata of chronic liver disease.  Neurologic exam shows no asterixis.     Recent Results (from the past 168 hour(s))   INR [[id number]]    Collection Time: 09/18/20  6:41 AM   Result Value Ref Range    INR 1.26 (H) 0.86 - 1.14   CBC with platelets [[id number]]    Collection Time: 09/18/20  6:41 AM   Result Value Ref Range    WBC 26.9 (H) 4.0 - 11.0 10e9/L    RBC Count 4.86 4.4 - 5.9 10e12/L    Hemoglobin 15.4 13.3 - 17.7 g/dL    Hematocrit 46.7 40.0 - 53.0 %    MCV 96 78 - 100 fl    MCH 31.7 26.5 - 33.0 pg    MCHC 33.0 31.5 - 36.5 g/dL    RDW 14.8 10.0 - 15.0 %    Platelet Count 96 (L) 150 - 450 10e9/L   Basic metabolic panel [LAB15]    Collection Time: 09/18/20  6:41 AM   Result Value Ref Range    Sodium 144 133 - 144 mmol/L    Potassium 4.4 3.4 - 5.3 mmol/L    Chloride 111 (H) 94 - 109 mmol/L    Carbon Dioxide 28 20 - 32 mmol/L    Anion Gap 6 3 - 14 mmol/L    Glucose 168 (H) 70 - 99 mg/dL    Urea Nitrogen 9 7 - 30 mg/dL    Creatinine 0.71 0.66 - 1.25 mg/dL    GFR Estimate >90 >60 mL/min/[1.73_m2]    GFR Estimate If Black >90 >60 mL/min/[1.73_m2]    Calcium 9.2 8.5 - 10.1 mg/dL   Hepatic Panel [LAB20]    Collection Time: 09/18/20  6:41 AM   Result Value Ref Range    Bilirubin Direct 0.5 (H) 0.0 - 0.2 mg/dL    Bilirubin Total 1.9 (H) 0.2 - 1.3 mg/dL    Albumin 3.3 (L) 3.4 - 5.0 g/dL    Protein Total 6.2 (L) 6.8 - 8.8 g/dL    Alkaline Phosphatase 197 (H) 40 - 150 U/L    ALT 66 0 - 70 U/L    AST 60 (H) 0 - 45 U/L     EXAMINATION: US ABDOMEN COMPLETE WITH TIPSS DOPPLER, 9/18/2020 7:50 AM     COMPARISON: None.     HISTORY: Cirrhosis of liver     TECHNIQUE:  Grey-scale, color Doppler and spectral flow analysis.     Findings:      Liver: Hepatic parenchyma is  coarse and nodular. Hypoechoic lesion in  the right lobe measuring 2.4 x 1.7 x 1.3 cm (6 image 14). The main  portal vein is patent with antegrade flow measuring 1.5 cm in  diameter.     Gallbladder: Surgically absent     Bile Ducts: Both the intra and extrahepatic biliary system are of  normal caliber with the common duct measuring 8 mm in diameter.     Pancreas: Visualized portions of the head and body of the pancreas are  unremarkable. No peripancreatic fluid is visualized. Pancreas is  partially obscured.     Kidneys: Both kidneys are of normal echotexture, without mass nor  hydronephrosis.   The craniocaudal dimensions are: right- 11.4 cm,  left- 12 cm.     Spleen: The spleen is borderline in size and measures 12.8 cm in  sagittal dimension.     Aorta and IVC: The visualized portions of the aorta and IVC are  unremarkable. Distal aorta is obscured.     Fluid: No free fluid.     DOPPLER:      There is flow with towards the liver in the main portal vein:  22 cm/sec in the main portal vein  Retrograde at 20 cm/sec in the right portal vein  Retrograde  at 22 cm/sec in the left portal vein     There is flow towards the liver in the hepatic artery:  85 cm/sec peak systolic flow  23 cm/sec minimum diastolic flow   0.73 resistive index      The stent velocities are  76 cm/sec at the portal vein  19 cm/sec in mid stent  119 cm/sec in the right hepatic vein distal shunt end.      The splenic vein is patent and flow is towards the liver.      The left and middle hepatic veins are patent with flow towards the  IVC.                                                               Impression:   1. Patent TIPS, with persistent decreased velocity at the portal  venous end.  2. Hepatic cirrhosis. Redemonstration of hypoechoic nodule in the  right lobe of liver, slightly increased in size measuring up to 2.4 cm  compared to prior ultrasound dated 3/13/2020. Recommend dedicated MRI  examination setting of apparent increase in size  of this lesion.  3. Borderline splenomegaly    IMPRESSION:  Mr. Martinez is status post TIPS for refractory ascites, which has been worked quite well.  He also has hepatitis C and is a sustained virologic responder to hepatitis C treatment.  He did get a flu shot today.  He is otherwise up to date with regard to vaccines and other cancer screening.  I will not be making any change to his medical regimen today.  We will see him back in the clinic in 6 months.      He also has CLL, which appears to be under fairly good control.  He saw his hematologist last in June.      Thank you very much for allowing me to participate in the care of this patient.  If you have any questions regarding my recommendations, please do not hesitate to contact me.             Juancho Willard MD

## 2020-09-18 NOTE — NURSING NOTE
Chief Complaint   Patient presents with     RECHECK     6 mos follow up     BP (!) 165/81 (BP Location: Right arm, Patient Position: Sitting)   Pulse 74   Temp 97.7  F (36.5  C)   Wt 91.2 kg (201 lb)   SpO2 97%   BMI 27.64 kg/m        Alfonso Mckinney, EMT

## 2020-09-18 NOTE — PROGRESS NOTES
I had the pleasure of seeing Mehdi Martinez for followup in the Liver Clinic at the [facility] on 09/18/2020.  Mr. 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  He is a sustained virologic responder to hepatitis C treatment.      He is doing well at this visit.  He denies any abdominal pain, itching or skin rash, although he did have an episode of tinea capitis a while back.  He has moderate fatigue.  He denies any increased abdominal girth or lower extremity edema.  He has not had any gastrointestinal bleeding or any overt signs of hepatic encephalopathy.      He denies any fevers or chills, cough or shortness of breath.  He denies any nausea or vomiting, diarrhea or constipation.  His appetite has been good.  His weight is actually up significantly.      He does report that his diabetes has been under good control.     Current Outpatient Medications   Medication     aMILoride (MIDAMOR) 5 MG tablet     cetirizine (ZYRTEC) 10 MG tablet     fluticasone (FLONASE) 50 MCG/ACT nasal spray     folic acid (FOLVITE) 1 MG tablet     metFORMIN (GLUCOPHAGE) 1000 MG tablet     methotrexate 2.5 MG tablet     Pseudoephedrine-APAP  MG TABS     rifaximin (XIFAXAN) 550 MG TABS tablet     Rivaroxaban (XARELTO PO)     zinc sulfate (ZINCATE) 220 MG capsule     aspirin (ASA) 325 MG tablet     furosemide (LASIX) 40 MG tablet     lactulose (CHRONULAC) 10 GM/15ML solution     sulfamethoxazole-trimethoprim (BACTRIM DS) 800-160 MG per tablet     No current facility-administered medications for this visit.      BP (!) 165/81 (BP Location: Right arm, Patient Position: Sitting)   Pulse 74   Temp 97.7  F (36.5  C)   Wt 91.2 kg (201 lb)   SpO2 97%   BMI 27.64 kg/m      PHYSICAL EXAMINATION:  In general, he looks quite well.  HEENT exam shows no scleral icterus or temporal muscle wasting.  Chest is clear.  Abdominal exam shows no increase in girth.  No masses  or tenderness to palpation are present.  His liver is 10 cm in span without left lobe enlargement.  No spleen tip is palpable, and extremity exam shows no edema.  Skin exam shows no stigmata of chronic liver disease.  Neurologic exam shows no asterixis.     Recent Results (from the past 168 hour(s))   INR [[id number]]    Collection Time: 09/18/20  6:41 AM   Result Value Ref Range    INR 1.26 (H) 0.86 - 1.14   CBC with platelets [[id number]]    Collection Time: 09/18/20  6:41 AM   Result Value Ref Range    WBC 26.9 (H) 4.0 - 11.0 10e9/L    RBC Count 4.86 4.4 - 5.9 10e12/L    Hemoglobin 15.4 13.3 - 17.7 g/dL    Hematocrit 46.7 40.0 - 53.0 %    MCV 96 78 - 100 fl    MCH 31.7 26.5 - 33.0 pg    MCHC 33.0 31.5 - 36.5 g/dL    RDW 14.8 10.0 - 15.0 %    Platelet Count 96 (L) 150 - 450 10e9/L   Basic metabolic panel [LAB15]    Collection Time: 09/18/20  6:41 AM   Result Value Ref Range    Sodium 144 133 - 144 mmol/L    Potassium 4.4 3.4 - 5.3 mmol/L    Chloride 111 (H) 94 - 109 mmol/L    Carbon Dioxide 28 20 - 32 mmol/L    Anion Gap 6 3 - 14 mmol/L    Glucose 168 (H) 70 - 99 mg/dL    Urea Nitrogen 9 7 - 30 mg/dL    Creatinine 0.71 0.66 - 1.25 mg/dL    GFR Estimate >90 >60 mL/min/[1.73_m2]    GFR Estimate If Black >90 >60 mL/min/[1.73_m2]    Calcium 9.2 8.5 - 10.1 mg/dL   Hepatic Panel [LAB20]    Collection Time: 09/18/20  6:41 AM   Result Value Ref Range    Bilirubin Direct 0.5 (H) 0.0 - 0.2 mg/dL    Bilirubin Total 1.9 (H) 0.2 - 1.3 mg/dL    Albumin 3.3 (L) 3.4 - 5.0 g/dL    Protein Total 6.2 (L) 6.8 - 8.8 g/dL    Alkaline Phosphatase 197 (H) 40 - 150 U/L    ALT 66 0 - 70 U/L    AST 60 (H) 0 - 45 U/L     EXAMINATION: US ABDOMEN COMPLETE WITH TIPSS DOPPLER, 9/18/2020 7:50 AM     COMPARISON: None.     HISTORY: Cirrhosis of liver     TECHNIQUE:  Grey-scale, color Doppler and spectral flow analysis.     Findings:      Liver: Hepatic parenchyma is coarse and nodular. Hypoechoic lesion in  the right lobe measuring 2.4 x 1.7 x 1.3 cm (6  image 14). The main  portal vein is patent with antegrade flow measuring 1.5 cm in  diameter.     Gallbladder: Surgically absent     Bile Ducts: Both the intra and extrahepatic biliary system are of  normal caliber with the common duct measuring 8 mm in diameter.     Pancreas: Visualized portions of the head and body of the pancreas are  unremarkable. No peripancreatic fluid is visualized. Pancreas is  partially obscured.     Kidneys: Both kidneys are of normal echotexture, without mass nor  hydronephrosis.   The craniocaudal dimensions are: right- 11.4 cm,  left- 12 cm.     Spleen: The spleen is borderline in size and measures 12.8 cm in  sagittal dimension.     Aorta and IVC: The visualized portions of the aorta and IVC are  unremarkable. Distal aorta is obscured.     Fluid: No free fluid.     DOPPLER:      There is flow with towards the liver in the main portal vein:  22 cm/sec in the main portal vein  Retrograde at 20 cm/sec in the right portal vein  Retrograde  at 22 cm/sec in the left portal vein     There is flow towards the liver in the hepatic artery:  85 cm/sec peak systolic flow  23 cm/sec minimum diastolic flow   0.73 resistive index      The stent velocities are  76 cm/sec at the portal vein  19 cm/sec in mid stent  119 cm/sec in the right hepatic vein distal shunt end.      The splenic vein is patent and flow is towards the liver.      The left and middle hepatic veins are patent with flow towards the  IVC.                                                               Impression:   1. Patent TIPS, with persistent decreased velocity at the portal  venous end.  2. Hepatic cirrhosis. Redemonstration of hypoechoic nodule in the  right lobe of liver, slightly increased in size measuring up to 2.4 cm  compared to prior ultrasound dated 3/13/2020. Recommend dedicated MRI  examination setting of apparent increase in size of this lesion.  3. Borderline splenomegaly    IMPRESSION:  Mr. Martinez is status post  TIPS for refractory ascites, which has been worked quite well.  He also has hepatitis C and is a sustained virologic responder to hepatitis C treatment.  He did get a flu shot today.  He is otherwise up to date with regard to vaccines and other cancer screening.  I will not be making any change to his medical regimen today.  We will see him back in the clinic in 6 months.      He also has CLL, which appears to be under fairly good control.  He saw his hematologist last in June.      Thank you very much for allowing me to participate in the care of this patient.  If you have any questions regarding my recommendations, please do not hesitate to contact me.

## 2020-09-18 NOTE — LETTER
9/18/2020         RE: Mehdi Martinez  [address] [facility] [place] [address]        Dear Colleague,    Thank you for referring your patient, Mehdi Martinez, to the [facility] HEPATOLOGY. Please see a copy of my visit note below.    I had the pleasure of seeing Mehdi Martinez for followup in the Liver Clinic at the [facility] on 09/18/2020.  Mr. 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  He is a sustained virologic responder to hepatitis C treatment.      He is doing well at this visit.  He denies any abdominal pain, itching or skin rash, although he did have an episode of tinea capitis a while back.  He has moderate fatigue.  He denies any increased abdominal girth or lower extremity edema.  He has not had any gastrointestinal bleeding or any overt signs of hepatic encephalopathy.      He denies any fevers or chills, cough or shortness of breath.  He denies any nausea or vomiting, diarrhea or constipation.  His appetite has been good.  His weight is actually up significantly.      He does report that his diabetes has been under good control.     Current Outpatient Medications   Medication     aMILoride (MIDAMOR) 5 MG tablet     cetirizine (ZYRTEC) 10 MG tablet     fluticasone (FLONASE) 50 MCG/ACT nasal spray     folic acid (FOLVITE) 1 MG tablet     metFORMIN (GLUCOPHAGE) 1000 MG tablet     methotrexate 2.5 MG tablet     Pseudoephedrine-APAP  MG TABS     rifaximin (XIFAXAN) 550 MG TABS tablet     Rivaroxaban (XARELTO PO)     zinc sulfate (ZINCATE) 220 MG capsule     aspirin (ASA) 325 MG tablet     furosemide (LASIX) 40 MG tablet     lactulose (CHRONULAC) 10 GM/15ML solution     sulfamethoxazole-trimethoprim (BACTRIM DS) 800-160 MG per tablet     No current facility-administered medications for this visit.      BP (!) 165/81 (BP Location: Right arm, Patient Position: Sitting)   Pulse 74   Temp 97.7  F (36.5  C)   Wt 91.2 kg  (201 lb)   SpO2 97%   BMI 27.64 kg/m      PHYSICAL EXAMINATION:  In general, he looks quite well.  HEENT exam shows no scleral icterus or temporal muscle wasting.  Chest is clear.  Abdominal exam shows no increase in girth.  No masses or tenderness to palpation are present.  His liver is 10 cm in span without left lobe enlargement.  No spleen tip is palpable, and extremity exam shows no edema.  Skin exam shows no stigmata of chronic liver disease.  Neurologic exam shows no asterixis.     Recent Results (from the past 168 hour(s))   INR [QYH0441]    Collection Time: 09/18/20  6:41 AM   Result Value Ref Range    INR 1.26 (H) 0.86 - 1.14   CBC with platelets [[id number]]    Collection Time: 09/18/20  6:41 AM   Result Value Ref Range    WBC 26.9 (H) 4.0 - 11.0 10e9/L    RBC Count 4.86 4.4 - 5.9 10e12/L    Hemoglobin 15.4 13.3 - 17.7 g/dL    Hematocrit 46.7 40.0 - 53.0 %    MCV 96 78 - 100 fl    MCH 31.7 26.5 - 33.0 pg    MCHC 33.0 31.5 - 36.5 g/dL    RDW 14.8 10.0 - 15.0 %    Platelet Count 96 (L) 150 - 450 10e9/L   Basic metabolic panel [LAB15]    Collection Time: 09/18/20  6:41 AM   Result Value Ref Range    Sodium 144 133 - 144 mmol/L    Potassium 4.4 3.4 - 5.3 mmol/L    Chloride 111 (H) 94 - 109 mmol/L    Carbon Dioxide 28 20 - 32 mmol/L    Anion Gap 6 3 - 14 mmol/L    Glucose 168 (H) 70 - 99 mg/dL    Urea Nitrogen 9 7 - 30 mg/dL    Creatinine 0.71 0.66 - 1.25 mg/dL    GFR Estimate >90 >60 mL/min/[1.73_m2]    GFR Estimate If Black >90 >60 mL/min/[1.73_m2]    Calcium 9.2 8.5 - 10.1 mg/dL   Hepatic Panel [LAB20]    Collection Time: 09/18/20  6:41 AM   Result Value Ref Range    Bilirubin Direct 0.5 (H) 0.0 - 0.2 mg/dL    Bilirubin Total 1.9 (H) 0.2 - 1.3 mg/dL    Albumin 3.3 (L) 3.4 - 5.0 g/dL    Protein Total 6.2 (L) 6.8 - 8.8 g/dL    Alkaline Phosphatase 197 (H) 40 - 150 U/L    ALT 66 0 - 70 U/L    AST 60 (H) 0 - 45 U/L     EXAMINATION: US ABDOMEN COMPLETE WITH TIPSS DOPPLER, 9/18/2020 7:50 AM     COMPARISON:  None.     HISTORY: Cirrhosis of liver     TECHNIQUE:  Grey-scale, color Doppler and spectral flow analysis.     Findings:      Liver: Hepatic parenchyma is coarse and nodular. Hypoechoic lesion in  the right lobe measuring 2.4 x 1.7 x 1.3 cm (6 image 14). The main  portal vein is patent with antegrade flow measuring 1.5 cm in  diameter.     Gallbladder: Surgically absent     Bile Ducts: Both the intra and extrahepatic biliary system are of  normal caliber with the common duct measuring 8 mm in diameter.     Pancreas: Visualized portions of the head and body of the pancreas are  unremarkable. No peripancreatic fluid is visualized. Pancreas is  partially obscured.     Kidneys: Both kidneys are of normal echotexture, without mass nor  hydronephrosis.   The craniocaudal dimensions are: right- 11.4 cm,  left- 12 cm.     Spleen: The spleen is borderline in size and measures 12.8 cm in  sagittal dimension.     Aorta and IVC: The visualized portions of the aorta and IVC are  unremarkable. Distal aorta is obscured.     Fluid: No free fluid.     DOPPLER:      There is flow with towards the liver in the main portal vein:  22 cm/sec in the main portal vein  Retrograde at 20 cm/sec in the right portal vein  Retrograde  at 22 cm/sec in the left portal vein     There is flow towards the liver in the hepatic artery:  85 cm/sec peak systolic flow  23 cm/sec minimum diastolic flow   0.73 resistive index      The stent velocities are  76 cm/sec at the portal vein  19 cm/sec in mid stent  119 cm/sec in the right hepatic vein distal shunt end.      The splenic vein is patent and flow is towards the liver.      The left and middle hepatic veins are patent with flow towards the  IVC.                                                               Impression:   1. Patent TIPS, with persistent decreased velocity at the portal  venous end.  2. Hepatic cirrhosis. Redemonstration of hypoechoic nodule in the  right lobe of liver, slightly  increased in size measuring up to 2.4 cm  compared to prior ultrasound dated 3/13/2020. Recommend dedicated MRI  examination setting of apparent increase in size of this lesion.  3. Borderline splenomegaly    IMPRESSION:  Mr. Martinez is status post TIPS for refractory ascites, which has been worked quite well.  He also has hepatitis C and is a sustained virologic responder to hepatitis C treatment.  He did get a flu shot today.  He is otherwise up to date with regard to vaccines and other cancer screening.  I will not be making any change to his medical regimen today.  We will see him back in the clinic in 6 months.      He also has CLL, which appears to be under fairly good control.  He saw his hematologist last in June.      Thank you very much for allowing me to participate in the care of this patient.  If you have any questions regarding my recommendations, please do not hesitate to contact me.           Again, thank you for allowing me to participate in the care of your patient.        Sincerely,        Juancho Willard MD

## 2021-02-17 ENCOUNTER — TELEPHONE (OUTPATIENT)
Dept: GASTROENTEROLOGY | Facility: CLINIC | Age: 66
End: 2021-02-17

## 2021-02-17 NOTE — CONFIDENTIAL NOTE
Prior Authorization Retail Medication Request    Medication/Dose: Xifaxan 550 mg  ICD code (if different than what is on RX):   Previously Tried and Failed:  Lactulose alone.  Rationale:      Insurance Name:    Insurance ID:        Pharmacy Information (if different than what is on RX)  Name:    Phone:

## 2021-02-17 NOTE — TELEPHONE ENCOUNTER
Central Prior Authorization Team   [phone]    PA Initiation    Medication: Xifaxan 550 mg  Insurance Company: [employer] - Phone [phone] Fax [phone]  Pharmacy Filling the Rx: [facility] - [place], [place] - [facility] [address]  Filling Pharmacy Phone: [phone]  Filling Pharmacy Fax: [phone]  Start Date: 2/17/2021

## 2021-02-17 NOTE — TELEPHONE ENCOUNTER
Prior Authorization Approval    Authorization Effective Date: 1/17/2021  Authorization Expiration Date: 2/17/2024  Medication: Xifaxan 550 mg-PA APPROVED   Approved Dose/Quantity:  Reference #:     Insurance Company: [employer] - Phone [phone] Fax [phone]  Expected CoPay:       CoPay Card Available:      Foundation Assistance Needed:    Which Pharmacy is filling the prescription (Not needed for infusion/clinic administered): [facility] - [address]  Pharmacy Notified: No  Patient Notified: Yes- Called patient and patient will contact their pharmacy to get the order set up/shipped.

## 2021-03-18 ENCOUNTER — ANCILLARY PROCEDURE (OUTPATIENT)
Dept: ULTRASOUND IMAGING | Facility: CLINIC | Age: 66
End: 2021-03-18
Attending: INTERNAL MEDICINE
Payer: COMMERCIAL

## 2021-03-18 ENCOUNTER — OFFICE VISIT (OUTPATIENT)
Dept: GASTROENTEROLOGY | Facility: CLINIC | Age: 66
End: 2021-03-18
Attending: INTERNAL MEDICINE
Payer: COMMERCIAL

## 2021-03-18 VITALS
BODY MASS INDEX: 28.06 KG/M2 | DIASTOLIC BLOOD PRESSURE: 65 MMHG | OXYGEN SATURATION: 96 % | TEMPERATURE: 97.9 F | WEIGHT: 204 LBS | SYSTOLIC BLOOD PRESSURE: 153 MMHG | HEART RATE: 90 BPM

## 2021-03-18 DIAGNOSIS — K74.60 CIRRHOSIS OF LIVER WITHOUT ASCITES, UNSPECIFIED HEPATIC CIRRHOSIS TYPE (H): Primary | ICD-10-CM

## 2021-03-18 DIAGNOSIS — K74.60 CIRRHOSIS OF LIVER WITHOUT ASCITES, UNSPECIFIED HEPATIC CIRRHOSIS TYPE (H): ICD-10-CM

## 2021-03-18 LAB
AFP SERPL-MCNC: 4.3 UG/L (ref 0–8)
ALBUMIN SERPL-MCNC: 3.4 G/DL (ref 3.4–5)
ALP SERPL-CCNC: 189 U/L (ref 40–150)
ALT SERPL W P-5'-P-CCNC: 70 U/L (ref 0–70)
ANION GAP SERPL CALCULATED.3IONS-SCNC: 4 MMOL/L (ref 3–14)
AST SERPL W P-5'-P-CCNC: 84 U/L (ref 0–45)
BILIRUB DIRECT SERPL-MCNC: 0.4 MG/DL (ref 0–0.2)
BILIRUB SERPL-MCNC: 1.4 MG/DL (ref 0.2–1.3)
BUN SERPL-MCNC: 10 MG/DL (ref 7–30)
CALCIUM SERPL-MCNC: 9.4 MG/DL (ref 8.5–10.1)
CHLORIDE SERPL-SCNC: 114 MMOL/L (ref 94–109)
CO2 SERPL-SCNC: 25 MMOL/L (ref 20–32)
CREAT SERPL-MCNC: 0.7 MG/DL (ref 0.66–1.25)
ERYTHROCYTE [DISTWIDTH] IN BLOOD BY AUTOMATED COUNT: 14.6 % (ref 10–15)
GFR SERPL CREATININE-BSD FRML MDRD: >90 ML/MIN/{1.73_M2}
GLUCOSE SERPL-MCNC: 117 MG/DL (ref 70–99)
HCT VFR BLD AUTO: 46.4 % (ref 40–53)
HGB BLD-MCNC: 15.3 G/DL (ref 13.3–17.7)
INR PPP: 1.22 (ref 0.86–1.14)
MCH RBC QN AUTO: 30.4 PG (ref 26.5–33)
MCHC RBC AUTO-ENTMCNC: 33 G/DL (ref 31.5–36.5)
MCV RBC AUTO: 92 FL (ref 78–100)
PLATELET # BLD AUTO: 102 10E9/L (ref 150–450)
POTASSIUM SERPL-SCNC: 4.6 MMOL/L (ref 3.4–5.3)
PROT SERPL-MCNC: 6.1 G/DL (ref 6.8–8.8)
RBC # BLD AUTO: 5.03 10E12/L (ref 4.4–5.9)
SODIUM SERPL-SCNC: 143 MMOL/L (ref 133–144)
WBC # BLD AUTO: 22.1 10E9/L (ref 4–11)

## 2021-03-18 PROCEDURE — G0463 HOSPITAL OUTPT CLINIC VISIT: HCPCS

## 2021-03-18 PROCEDURE — 85610 PROTHROMBIN TIME: CPT | Performed by: PATHOLOGY

## 2021-03-18 PROCEDURE — 99215 OFFICE O/P EST HI 40 MIN: CPT | Performed by: INTERNAL MEDICINE

## 2021-03-18 PROCEDURE — 80076 HEPATIC FUNCTION PANEL: CPT | Performed by: PATHOLOGY

## 2021-03-18 PROCEDURE — 99000 SPECIMEN HANDLING OFFICE-LAB: CPT | Performed by: PATHOLOGY

## 2021-03-18 PROCEDURE — 85027 COMPLETE CBC AUTOMATED: CPT | Performed by: PATHOLOGY

## 2021-03-18 PROCEDURE — 82105 ALPHA-FETOPROTEIN SERUM: CPT | Mod: 90 | Performed by: PATHOLOGY

## 2021-03-18 PROCEDURE — 93975 VASCULAR STUDY: CPT | Mod: GC | Performed by: RADIOLOGY

## 2021-03-18 PROCEDURE — 36415 COLL VENOUS BLD VENIPUNCTURE: CPT | Performed by: PATHOLOGY

## 2021-03-18 PROCEDURE — 80048 BASIC METABOLIC PNL TOTAL CA: CPT | Performed by: PATHOLOGY

## 2021-03-18 ASSESSMENT — PAIN SCALES - GENERAL: PAINLEVEL: MILD PAIN (3)

## 2021-03-18 NOTE — PROGRESS NOTES
I had the pleasure of seeing Mehdi Martinez for followup in the Liver Clinic at the [facility] on 03/18/2021.  Mr. 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  He is status post TIPS for diuretic refractory ascites.      He is largely unchanged.  He is complaining of some unsteadiness on his feet and did wonder about whether it might be related to hepatic encephalopathy.  However, he denies any tremor and at least here in clinic, his movements and speech are crisp.      He denies any abdominal pain.  He still has some left chest wall pain where he had broken some ribs previously.  He does have some itching, which he has a cream to put on it that manages that problem well.  He does report some mild fatigue.  He denies any increased abdominal girth.  He has some mild lower extremity edema.  He has not had any gastrointestinal bleeding or any overt signs of hepatic encephalopathy.      He denies any fevers or chills, cough or shortness of breath.  He denies any nausea, vomiting, diarrhea or constipation.  His appetite has been good, and his weight is up about 5-10 pounds.  He has been fairly active.      He also does have CLL, which he is following with an oncologist at [facility], and that problem appears to be stable.     Current Outpatient Medications   Medication     aMILoride (MIDAMOR) 5 MG tablet     cetirizine (ZYRTEC) 10 MG tablet     fluticasone (FLONASE) 50 MCG/ACT nasal spray     folic acid (FOLVITE) 1 MG tablet     metFORMIN (GLUCOPHAGE) 1000 MG tablet     methotrexate 2.5 MG tablet     Pseudoephedrine-APAP  MG TABS     rifaximin (XIFAXAN) 550 MG TABS tablet     zinc sulfate (ZINCATE) 220 MG capsule     aspirin (ASA) 325 MG tablet     furosemide (LASIX) 40 MG tablet     lactulose (CHRONULAC) 10 GM/15ML solution     Rivaroxaban (XARELTO PO)     sulfamethoxazole-trimethoprim (BACTRIM DS) 800-160 MG per tablet     No current  facility-administered medications for this visit.      BP (!) 153/65   Pulse 90   Temp 97.9  F (36.6  C)   Wt 92.5 kg (204 lb)   SpO2 96%   BMI 28.06 kg/m      PHYSICAL EXAMINATION:  In general, he looks well.  HEENT exam shows no scleral icterus or temporal muscle wasting.  His chest is clear.  Abdominal exam shows no obvious ascites.  No masses or tenderness to palpation are present.  His liver is 10 cm in span without left lobe enlargement.  A spleen tip is palpable.  Extremity exam shows no edema.  Skin exam shows no stigmata of chronic liver disease.  Neurologic exam shows no asterixis.     Recent Results (from the past 168 hour(s))   INR [[id number]]    Collection Time: 03/18/21  8:22 AM   Result Value Ref Range    INR 1.22 (H) 0.86 - 1.14   CBC with platelets [[id number]]    Collection Time: 03/18/21  8:22 AM   Result Value Ref Range    WBC 22.1 (H) 4.0 - 11.0 10e9/L    RBC Count 5.03 4.4 - 5.9 10e12/L    Hemoglobin 15.3 13.3 - 17.7 g/dL    Hematocrit 46.4 40.0 - 53.0 %    MCV 92 78 - 100 fl    MCH 30.4 26.5 - 33.0 pg    MCHC 33.0 31.5 - 36.5 g/dL    RDW 14.6 10.0 - 15.0 %    Platelet Count 102 (L) 150 - 450 10e9/L   Basic metabolic panel [LAB15]    Collection Time: 03/18/21  8:22 AM   Result Value Ref Range    Sodium 143 133 - 144 mmol/L    Potassium 4.6 3.4 - 5.3 mmol/L    Chloride 114 (H) 94 - 109 mmol/L    Carbon Dioxide 25 20 - 32 mmol/L    Anion Gap 4 3 - 14 mmol/L    Glucose 117 (H) 70 - 99 mg/dL    Urea Nitrogen 10 7 - 30 mg/dL    Creatinine 0.70 0.66 - 1.25 mg/dL    GFR Estimate >90 >60 mL/min/[1.73_m2]    GFR Estimate If Black >90 >60 mL/min/[1.73_m2]    Calcium 9.4 8.5 - 10.1 mg/dL   Hepatic Panel [LAB20]    Collection Time: 03/18/21  8:22 AM   Result Value Ref Range    Bilirubin Direct 0.4 (H) 0.0 - 0.2 mg/dL    Bilirubin Total 1.4 (H) 0.2 - 1.3 mg/dL    Albumin 3.4 3.4 - 5.0 g/dL    Protein Total 6.1 (L) 6.8 - 8.8 g/dL    Alkaline Phosphatase 189 (H) 40 - 150 U/L    ALT 70 0 - 70 U/L    AST 84 (H)  0 - 45 U/L     EXAMINATION: US ABDOMEN COMPLETE WITH TIPSS DOPPLER, 3/18/2021 8:18 AM     COMPARISON: 9/18/2020 TIPS ultrasound     HISTORY:  Cirrhosis of liver without ascites      TECHNIQUE:  Grey-scale, color Doppler and spectral flow analysis.     Findings:      Liver: Hepatic parenchyma is of increased heterogeneous echogenicity  with nodular hepatic capsule. The main portal vein is patent with  antegrade flow measuring 1.5 cm in diameter. Within the right hepatic  lobe near the termination of the TIPS stent there is a poorly  circumscribed, hypoechoic lesion measuring 2.3 x 1.5 x 1.1 cm there is  about abnormal color Doppler flow.     Gallbladder: The gallbladder is surgically absent     Bile Ducts: Both the intra and extrahepatic biliary system are of  normal caliber with the common duct measuring 7.4 mm in diameter.     Pancreas: Within the pancreatic head there is a well-circumscribed,  slightly hypoechoic lobulated lesion measuring 3.1 by 1.6 by 0.8 cm  without abnormal color Doppler flow. Remaining portions of the  visualized pancreatic head and neck are unremarkable.     Visualized portions of the head and body of the pancreas are  unremarkable. No peripancreatic fluid is visualized.     Kidneys: Both kidneys are of normal echotexture, without mass nor  hydronephrosis.   The craniocaudal dimensions are: right- 11.4 cm,  left- 11.9 cm.     Spleen: The spleen is unremarkable and measures 14.0 cm in sagittal  dimension.     Aorta and IVC: The visualized portions of the aorta and IVC are  unremarkable.     Fluid: No free fluid.     DOPPLER:      There is flow antegrade to the liver in the main portal vein:  18 cm/sec in the main portal vein  Antegrade at 18 cm/sec in the right portal vein  Retrograde  at 12 cm/sec in the left portal vein     There is flow antegrade the liver in the hepatic artery:  133 cm/sec peak systolic flow  37 cm/sec minimum diastolic flow   0.72 resistive index      The stent  velocities are  33-53 cm/sec at the portal vein  88 cm/sec in mid stent  123 cm/sec in the hepatic vein distal shunt end.      The splenic vein is patent and flow is towards the liver.      The left, middle, and right hepatic veins are patent with flow towards  the IVC.                                                                 Impression:   1. Patent TIPS with progression of decreased velocities at the portal  venous end. Antegrade flow in the right portal vein may be an early  sign of shunt dysfunction. (previously retrograde which is expected  with a tips.)  2. Cirrhotic appearing liver with increase size of hypoechoic lesion  in the right lobe. Additionally, new and lobulated hypoechoic lesion  within the head of the pancreas. Recommend dedicated MRI for further  characterization of both lesions.  3. Splenomegaly without focal lesion.    IMPRESSION:  Mr. 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  He is a sustained virologic responder to hepatitis C treatment.  His liver function is excellent, his ultrasound shows no ascites, and his TIPS flow velocities are unchanged.  I will get an MRI to follow-up liver lesion as well as a new pancreatic lesion.  I do believe all complications are well addressed, and I will not be making any change to his medical regimen.      He has not yet had the COVID-19 vaccine, which I have encouraged him to do so.  He is on a list through his primary care provider.      Otherwise, my plan will be to see him back in the clinic in 6 months for repeat imaging and blood work.      Thank you very much for allowing me to participate in the care of this patient.  If you have any questions regarding my recommendations, please do not hesitate to contact me.         Juancho Willard MD      Professor of Medicine  [employer]      Executive Medical Director, Solid Organ Transplant Program  [facility]

## 2021-03-18 NOTE — NURSING NOTE
Chief Complaint   Patient presents with     RECHECK     6 MONTH FOLLOW UP     BP (!) 153/65   Pulse 90   Temp 97.9  F (36.6  C)   Wt 92.5 kg (204 lb)   SpO2 96%   BMI 28.06 kg/m        Amena CRUZ CMA

## 2021-03-18 NOTE — LETTER
3/18/2021         RE: Mehdi Martinez  [address] [facility] [address]        Dear Colleague,    Thank you for referring your patient, Mehdi Martinez, to the [facility] HEPATOLOGY [facility]IN[facility] [place]. Please see a copy of my visit note below.    I had the pleasure of seeing Mehdi Martinez for followup in the Liver Clinic at the [facility] on 03/18/2021.  Mr. 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  He is status post TIPS for diuretic refractory ascites.      He is largely unchanged.  He is complaining of some unsteadiness on his feet and did wonder about whether it might be related to hepatic encephalopathy.  However, he denies any tremor and at least here in clinic, his movements and speech are crisp.      He denies any abdominal pain.  He still has some left chest wall pain where he had broken some ribs previously.  He does have some itching, which he has a cream to put on it that manages that problem well.  He does report some mild fatigue.  He denies any increased abdominal girth.  He has some mild lower extremity edema.  He has not had any gastrointestinal bleeding or any overt signs of hepatic encephalopathy.      He denies any fevers or chills, cough or shortness of breath.  He denies any nausea, vomiting, diarrhea or constipation.  His appetite has been good, and his weight is up about 5-10 pounds.  He has been fairly active.      He also does have CLL, which he is following with an oncologist at [facility], and that problem appears to be stable.     Current Outpatient Medications   Medication     aMILoride (MIDAMOR) 5 MG tablet     cetirizine (ZYRTEC) 10 MG tablet     fluticasone (FLONASE) 50 MCG/ACT nasal spray     folic acid (FOLVITE) 1 MG tablet     metFORMIN (GLUCOPHAGE) 1000 MG tablet     methotrexate 2.5 MG tablet     Pseudoephedrine-APAP  MG TABS     rifaximin (XIFAXAN) 550 MG TABS tablet     zinc sulfate  (ZINCATE) 220 MG capsule     aspirin (ASA) 325 MG tablet     furosemide (LASIX) 40 MG tablet     lactulose (CHRONULAC) 10 GM/15ML solution     Rivaroxaban (XARELTO PO)     sulfamethoxazole-trimethoprim (BACTRIM DS) 800-160 MG per tablet     No current facility-administered medications for this visit.      BP (!) 153/65   Pulse 90   Temp 97.9  F (36.6  C)   Wt 92.5 kg (204 lb)   SpO2 96%   BMI 28.06 kg/m      PHYSICAL EXAMINATION:  In general, he looks well.  HEENT exam shows no scleral icterus or temporal muscle wasting.  His chest is clear.  Abdominal exam shows no obvious ascites.  No masses or tenderness to palpation are present.  His liver is 10 cm in span without left lobe enlargement.  A spleen tip is palpable.  Extremity exam shows no edema.  Skin exam shows no stigmata of chronic liver disease.  Neurologic exam shows no asterixis.     Recent Results (from the past 168 hour(s))   INR [[id number]]    Collection Time: 03/18/21  8:22 AM   Result Value Ref Range    INR 1.22 (H) 0.86 - 1.14   CBC with platelets [[id number]]    Collection Time: 03/18/21  8:22 AM   Result Value Ref Range    WBC 22.1 (H) 4.0 - 11.0 10e9/L    RBC Count 5.03 4.4 - 5.9 10e12/L    Hemoglobin 15.3 13.3 - 17.7 g/dL    Hematocrit 46.4 40.0 - 53.0 %    MCV 92 78 - 100 fl    MCH 30.4 26.5 - 33.0 pg    MCHC 33.0 31.5 - 36.5 g/dL    RDW 14.6 10.0 - 15.0 %    Platelet Count 102 (L) 150 - 450 10e9/L   Basic metabolic panel [LAB15]    Collection Time: 03/18/21  8:22 AM   Result Value Ref Range    Sodium 143 133 - 144 mmol/L    Potassium 4.6 3.4 - 5.3 mmol/L    Chloride 114 (H) 94 - 109 mmol/L    Carbon Dioxide 25 20 - 32 mmol/L    Anion Gap 4 3 - 14 mmol/L    Glucose 117 (H) 70 - 99 mg/dL    Urea Nitrogen 10 7 - 30 mg/dL    Creatinine 0.70 0.66 - 1.25 mg/dL    GFR Estimate >90 >60 mL/min/[1.73_m2]    GFR Estimate If Black >90 >60 mL/min/[1.73_m2]    Calcium 9.4 8.5 - 10.1 mg/dL   Hepatic Panel [LAB20]    Collection Time: 03/18/21  8:22 AM   Result  Value Ref Range    Bilirubin Direct 0.4 (H) 0.0 - 0.2 mg/dL    Bilirubin Total 1.4 (H) 0.2 - 1.3 mg/dL    Albumin 3.4 3.4 - 5.0 g/dL    Protein Total 6.1 (L) 6.8 - 8.8 g/dL    Alkaline Phosphatase 189 (H) 40 - 150 U/L    ALT 70 0 - 70 U/L    AST 84 (H) 0 - 45 U/L     EXAMINATION: US ABDOMEN COMPLETE WITH TIPSS DOPPLER, 3/18/2021 8:18 AM     COMPARISON: 9/18/2020 TIPS ultrasound     HISTORY:  Cirrhosis of liver without ascites      TECHNIQUE:  Grey-scale, color Doppler and spectral flow analysis.     Findings:      Liver: Hepatic parenchyma is of increased heterogeneous echogenicity  with nodular hepatic capsule. The main portal vein is patent with  antegrade flow measuring 1.5 cm in diameter. Within the right hepatic  lobe near the termination of the TIPS stent there is a poorly  circumscribed, hypoechoic lesion measuring 2.3 x 1.5 x 1.1 cm there is  about abnormal color Doppler flow.     Gallbladder: The gallbladder is surgically absent     Bile Ducts: Both the intra and extrahepatic biliary system are of  normal caliber with the common duct measuring 7.4 mm in diameter.     Pancreas: Within the pancreatic head there is a well-circumscribed,  slightly hypoechoic lobulated lesion measuring 3.1 by 1.6 by 0.8 cm  without abnormal color Doppler flow. Remaining portions of the  visualized pancreatic head and neck are unremarkable.     Visualized portions of the head and body of the pancreas are  unremarkable. No peripancreatic fluid is visualized.     Kidneys: Both kidneys are of normal echotexture, without mass nor  hydronephrosis.   The craniocaudal dimensions are: right- 11.4 cm,  left- 11.9 cm.     Spleen: The spleen is unremarkable and measures 14.0 cm in sagittal  dimension.     Aorta and IVC: The visualized portions of the aorta and IVC are  unremarkable.     Fluid: No free fluid.     DOPPLER:      There is flow antegrade to the liver in the main portal vein:  18 cm/sec in the main portal vein  Antegrade at 18  cm/sec in the right portal vein  Retrograde  at 12 cm/sec in the left portal vein     There is flow antegrade the liver in the hepatic artery:  133 cm/sec peak systolic flow  37 cm/sec minimum diastolic flow   0.72 resistive index      The stent velocities are  33-53 cm/sec at the portal vein  88 cm/sec in mid stent  123 cm/sec in the hepatic vein distal shunt end.      The splenic vein is patent and flow is towards the liver.      The left, middle, and right hepatic veins are patent with flow towards  the IVC.                                                                 Impression:   1. Patent TIPS with progression of decreased velocities at the portal  venous end. Antegrade flow in the right portal vein may be an early  sign of shunt dysfunction. (previously retrograde which is expected  with a tips.)  2. Cirrhotic appearing liver with increase size of hypoechoic lesion  in the right lobe. Additionally, new and lobulated hypoechoic lesion  within the head of the pancreas. Recommend dedicated MRI for further  characterization of both lesions.  3. Splenomegaly without focal lesion.    IMPRESSION:  Mr. 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  He is a sustained virologic responder to hepatitis C treatment.  His liver function is excellent, his ultrasound shows no ascites, and his TIPS flow velocities are unchanged.  I will get an MRI to follow-up liver lesion as well as a new pancreatic lesion.  I do believe all complications are well addressed, and I will not be making any change to his medical regimen.      He has not yet had the COVID-19 vaccine, which I have encouraged him to do so.  He is on a list through his primary care provider.      Otherwise, my plan will be to see him back in the clinic in 6 months for repeat imaging and blood work.      Thank you very much for allowing me to participate in the care of this patient.  If you have any questions regarding my recommendations, please do not  hesitate to contact me.         Juancho Willard MD      Professor of Medicine  [employer]      Executive Medical Director, Solid Organ Transplant Program  [facility]

## 2021-08-02 DIAGNOSIS — K76.82 HEPATIC ENCEPHALOPATHY (H): ICD-10-CM

## 2021-09-16 ENCOUNTER — LAB (OUTPATIENT)
Dept: LAB | Facility: CLINIC | Age: 66
End: 2021-09-16
Payer: COMMERCIAL

## 2021-09-16 ENCOUNTER — ANCILLARY PROCEDURE (OUTPATIENT)
Dept: ULTRASOUND IMAGING | Facility: CLINIC | Age: 66
End: 2021-09-16
Attending: INTERNAL MEDICINE
Payer: COMMERCIAL

## 2021-09-16 ENCOUNTER — OFFICE VISIT (OUTPATIENT)
Dept: GASTROENTEROLOGY | Facility: CLINIC | Age: 66
End: 2021-09-16
Attending: INTERNAL MEDICINE
Payer: COMMERCIAL

## 2021-09-16 VITALS
BODY MASS INDEX: 27.07 KG/M2 | SYSTOLIC BLOOD PRESSURE: 157 MMHG | DIASTOLIC BLOOD PRESSURE: 94 MMHG | OXYGEN SATURATION: 95 % | HEART RATE: 70 BPM | WEIGHT: 196.8 LBS

## 2021-09-16 DIAGNOSIS — K74.60 CIRRHOSIS OF LIVER WITHOUT ASCITES, UNSPECIFIED HEPATIC CIRRHOSIS TYPE (H): ICD-10-CM

## 2021-09-16 DIAGNOSIS — K74.60 CIRRHOSIS OF LIVER WITHOUT ASCITES, UNSPECIFIED HEPATIC CIRRHOSIS TYPE (H): Primary | ICD-10-CM

## 2021-09-16 LAB
AFP SERPL-MCNC: 5.9 UG/L (ref 0–8)
ALBUMIN SERPL-MCNC: 3.3 G/DL (ref 3.4–5)
ALP SERPL-CCNC: 214 U/L (ref 40–150)
ALT SERPL W P-5'-P-CCNC: 54 U/L (ref 0–70)
ANION GAP SERPL CALCULATED.3IONS-SCNC: 8 MMOL/L (ref 3–14)
AST SERPL W P-5'-P-CCNC: 66 U/L (ref 0–45)
BILIRUB DIRECT SERPL-MCNC: 0.5 MG/DL (ref 0–0.2)
BILIRUB SERPL-MCNC: 1.6 MG/DL (ref 0.2–1.3)
BUN SERPL-MCNC: 7 MG/DL (ref 7–30)
CALCIUM SERPL-MCNC: 9 MG/DL (ref 8.5–10.1)
CHLORIDE BLD-SCNC: 111 MMOL/L (ref 94–109)
CO2 SERPL-SCNC: 26 MMOL/L (ref 20–32)
CREAT SERPL-MCNC: 0.68 MG/DL (ref 0.66–1.25)
ERYTHROCYTE [DISTWIDTH] IN BLOOD BY AUTOMATED COUNT: 14.8 % (ref 10–15)
GFR SERPL CREATININE-BSD FRML MDRD: >90 ML/MIN/1.73M2
GLUCOSE BLD-MCNC: 119 MG/DL (ref 70–99)
HCT VFR BLD AUTO: 45.2 % (ref 40–53)
HGB BLD-MCNC: 15.4 G/DL (ref 13.3–17.7)
INR PPP: 1.21 (ref 0.85–1.15)
MCH RBC QN AUTO: 31.5 PG (ref 26.5–33)
MCHC RBC AUTO-ENTMCNC: 34.1 G/DL (ref 31.5–36.5)
MCV RBC AUTO: 92 FL (ref 78–100)
PLATELET # BLD AUTO: 93 10E3/UL (ref 150–450)
POTASSIUM BLD-SCNC: 4.1 MMOL/L (ref 3.4–5.3)
PROT SERPL-MCNC: 6.1 G/DL (ref 6.8–8.8)
RBC # BLD AUTO: 4.89 10E6/UL (ref 4.4–5.9)
SODIUM SERPL-SCNC: 145 MMOL/L (ref 133–144)
WBC # BLD AUTO: 21.9 10E3/UL (ref 4–11)

## 2021-09-16 PROCEDURE — 80053 COMPREHEN METABOLIC PANEL: CPT | Performed by: PATHOLOGY

## 2021-09-16 PROCEDURE — 36415 COLL VENOUS BLD VENIPUNCTURE: CPT | Performed by: PATHOLOGY

## 2021-09-16 PROCEDURE — 93975 VASCULAR STUDY: CPT | Mod: GC | Performed by: RADIOLOGY

## 2021-09-16 PROCEDURE — 85610 PROTHROMBIN TIME: CPT | Performed by: PATHOLOGY

## 2021-09-16 PROCEDURE — G0463 HOSPITAL OUTPT CLINIC VISIT: HCPCS

## 2021-09-16 PROCEDURE — 85027 COMPLETE CBC AUTOMATED: CPT | Performed by: PATHOLOGY

## 2021-09-16 PROCEDURE — 99214 OFFICE O/P EST MOD 30 MIN: CPT | Performed by: INTERNAL MEDICINE

## 2021-09-16 PROCEDURE — 82248 BILIRUBIN DIRECT: CPT | Performed by: PATHOLOGY

## 2021-09-16 PROCEDURE — 82105 ALPHA-FETOPROTEIN SERUM: CPT | Mod: 90 | Performed by: PATHOLOGY

## 2021-09-16 NOTE — NURSING NOTE
Chief Complaint   Patient presents with     RECHECK     6 mos follow up         BP (!) 157/94 (BP Location: Right arm, Patient Position: Sitting, Cuff Size: Adult Regular)   Pulse 70   Wt 89.3 kg (196 lb 12.8 oz)   SpO2 95%   BMI 27.07 kg/m        Alfonso Mckinney, EMT

## 2021-09-16 NOTE — LETTER
9/16/2021         RE: Mehdi Martinez  [address] [facility] [address]        Dear Colleague,    Thank you for referring your patient, Mehdi Martinez, to the [facility] HEPATOLOGY CLIN[facility] [place]. Please see a copy of my visit note below.    We discussed participation in the [facility] patient-related outcomes study.  The patient understands that the study may later link survey data with clinical data.  The patient would like to be contacted to participate in the study.    HISTORY OF PRESENT ILLNESS:  I had the pleasure of seeing Mehdi Martinez for followup in the Liver Clinic at the [facility] on 09/16/2021.  Mr. 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  He is a sustained virologic responder to hepatitis C treatment.  He is also status post TIPS for diuretic-refractory ascites.    He feels well at this visit.  He denies any abdominal pain, itching, skin rash, or fatigue.  He denies any increased abdominal girth or lower extremity edema.  He has not had any gastrointestinal bleeding or any overt signs of hepatic encephalopathy.    He denies any fevers or chills, cough or shortness of breath.  He has been fully vaccinated against COVID-19.  He denies any nausea or vomiting, diarrhea or constipation.  His appetite has been good, and his weight has remained stable.      Current Outpatient Medications   Medication     aMILoride (MIDAMOR) 5 MG tablet     cetirizine (ZYRTEC) 10 MG tablet     fluticasone (FLONASE) 50 MCG/ACT nasal spray     folic acid (FOLVITE) 1 MG tablet     metFORMIN (GLUCOPHAGE) 1000 MG tablet     methotrexate 2.5 MG tablet     Pseudoephedrine-APAP  MG TABS     rifaximin (XIFAXAN) 550 MG TABS tablet     zinc sulfate (ZINCATE) 220 MG capsule     aspirin (ASA) 325 MG tablet     furosemide (LASIX) 40 MG tablet     lactulose (CHRONULAC) 10 GM/15ML solution     Rivaroxaban (XARELTO PO)     No current  facility-administered medications for this visit.     BP (!) 157/94 (BP Location: Right arm, Patient Position: Sitting, Cuff Size: Adult Regular)   Pulse 70   Wt 89.3 kg (196 lb 12.8 oz)   SpO2 95%   BMI 27.07 kg/m      PHYSICAL EXAMINATION:    GENERAL:  He looks well.  HEENT:  Exam shows no scleral icterus or temporal muscle wasting.  CHEST:  Clear.  ABDOMEN:  No increase in girth.  No masses or tenderness to palpation are present.  His liver is 10 cm in span without left lobe enlargement.  No spleen tip is palpable.  EXTREMITIES:  No edema.  SKIN:  No stigmata of chronic liver disease.  NEUROLOGIC:  Exam shows no asterixis.    Recent Results (from the past 168 hour(s))   INR [[id number]]    Collection Time: 09/16/21  7:01 AM   Result Value Ref Range    INR 1.21 (H) 0.85 - 1.15   CBC with platelets [[id number]]    Collection Time: 09/16/21  7:01 AM   Result Value Ref Range    WBC Count 21.9 (H) 4.0 - 11.0 10e3/uL    RBC Count 4.89 4.40 - 5.90 10e6/uL    Hemoglobin 15.4 13.3 - 17.7 g/dL    Hematocrit 45.2 40.0 - 53.0 %    MCV 92 78 - 100 fL    MCH 31.5 26.5 - 33.0 pg    MCHC 34.1 31.5 - 36.5 g/dL    RDW 14.8 10.0 - 15.0 %    Platelet Count 93 (L) 150 - 450 10e3/uL   Basic metabolic panel [LAB15]    Collection Time: 09/16/21  7:01 AM   Result Value Ref Range    Sodium 145 (H) 133 - 144 mmol/L    Potassium 4.1 3.4 - 5.3 mmol/L    Chloride 111 (H) 94 - 109 mmol/L    Carbon Dioxide (CO2) 26 20 - 32 mmol/L    Anion Gap 8 3 - 14 mmol/L    Urea Nitrogen 7 7 - 30 mg/dL    Creatinine 0.68 0.66 - 1.25 mg/dL    Calcium 9.0 8.5 - 10.1 mg/dL    Glucose 119 (H) 70 - 99 mg/dL    GFR Estimate >90 >60 mL/min/1.73m2   Hepatic Panel [LAB20]    Collection Time: 09/16/21  7:01 AM   Result Value Ref Range    Bilirubin Total 1.6 (H) 0.2 - 1.3 mg/dL    Bilirubin Direct 0.5 (H) 0.0 - 0.2 mg/dL    Protein Total 6.1 (L) 6.8 - 8.8 g/dL    Albumin 3.3 (L) 3.4 - 5.0 g/dL    Alkaline Phosphatase 214 (H) 40 - 150 U/L    AST 66 (H) 0 - 45 U/L    ALT  54 0 - 70 U/L      IMPRESSION:  Mr. Martinez has well-compensated cirrhosis.  He is status post TIPS with an excellent response without any evidence of ascites.  He does have some abnormalities on his ultrasound, which we investigated with an MRI 2 years ago and did not show anything.  These observations though have increased in size and I will get another MRI now.  Otherwise, I will not be making any other change to his medical regimen.  I will see him back in the clinic for repeat imaging and blood work in 6 months.    Thank you very much for allowing me to participate in the care of this patient.  If you have any questions regarding my recommendations, please do not hesitate to contact me.         Juancho Willard MD      Professor of Medicine  [employer]      Executive Medical Director, Solid Organ Transplant Program  [facility]            Again, thank you for allowing me to participate in the care of your patient.        Sincerely,        Juancho Willard MD

## 2021-09-16 NOTE — PROGRESS NOTES
We discussed participation in the [facility] patient-related outcomes study.  The patient understands that the study may later link survey data with clinical data.  The patient would like to be contacted to participate in the study.    HISTORY OF PRESENT ILLNESS:  I had the pleasure of seeing Mehdi Martinez for followup in the Liver Clinic at the [facility] on 09/16/2021.  Mr. 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  He is a sustained virologic responder to hepatitis C treatment.  He is also status post TIPS for diuretic-refractory ascites.    He feels well at this visit.  He denies any abdominal pain, itching, skin rash, or fatigue.  He denies any increased abdominal girth or lower extremity edema.  He has not had any gastrointestinal bleeding or any overt signs of hepatic encephalopathy.    He denies any fevers or chills, cough or shortness of breath.  He has been fully vaccinated against COVID-19.  He denies any nausea or vomiting, diarrhea or constipation.  His appetite has been good, and his weight has remained stable.      Current Outpatient Medications   Medication     aMILoride (MIDAMOR) 5 MG tablet     cetirizine (ZYRTEC) 10 MG tablet     fluticasone (FLONASE) 50 MCG/ACT nasal spray     folic acid (FOLVITE) 1 MG tablet     metFORMIN (GLUCOPHAGE) 1000 MG tablet     methotrexate 2.5 MG tablet     Pseudoephedrine-APAP  MG TABS     rifaximin (XIFAXAN) 550 MG TABS tablet     zinc sulfate (ZINCATE) 220 MG capsule     aspirin (ASA) 325 MG tablet     furosemide (LASIX) 40 MG tablet     lactulose (CHRONULAC) 10 GM/15ML solution     Rivaroxaban (XARELTO PO)     No current facility-administered medications for this visit.     BP (!) 157/94 (BP Location: Right arm, Patient Position: Sitting, Cuff Size: Adult Regular)   Pulse 70   Wt 89.3 kg (196 lb 12.8 oz)   SpO2 95%   BMI 27.07 kg/m      PHYSICAL EXAMINATION:    GENERAL:  He looks well.  HEENT:   Exam shows no scleral icterus or temporal muscle wasting.  CHEST:  Clear.  ABDOMEN:  No increase in girth.  No masses or tenderness to palpation are present.  His liver is 10 cm in span without left lobe enlargement.  No spleen tip is palpable.  EXTREMITIES:  No edema.  SKIN:  No stigmata of chronic liver disease.  NEUROLOGIC:  Exam shows no asterixis.    Recent Results (from the past 168 hour(s))   INR [[id number]]    Collection Time: 09/16/21  7:01 AM   Result Value Ref Range    INR 1.21 (H) 0.85 - 1.15   CBC with platelets [[id number]]    Collection Time: 09/16/21  7:01 AM   Result Value Ref Range    WBC Count 21.9 (H) 4.0 - 11.0 10e3/uL    RBC Count 4.89 4.40 - 5.90 10e6/uL    Hemoglobin 15.4 13.3 - 17.7 g/dL    Hematocrit 45.2 40.0 - 53.0 %    MCV 92 78 - 100 fL    MCH 31.5 26.5 - 33.0 pg    MCHC 34.1 31.5 - 36.5 g/dL    RDW 14.8 10.0 - 15.0 %    Platelet Count 93 (L) 150 - 450 10e3/uL   Basic metabolic panel [LAB15]    Collection Time: 09/16/21  7:01 AM   Result Value Ref Range    Sodium 145 (H) 133 - 144 mmol/L    Potassium 4.1 3.4 - 5.3 mmol/L    Chloride 111 (H) 94 - 109 mmol/L    Carbon Dioxide (CO2) 26 20 - 32 mmol/L    Anion Gap 8 3 - 14 mmol/L    Urea Nitrogen 7 7 - 30 mg/dL    Creatinine 0.68 0.66 - 1.25 mg/dL    Calcium 9.0 8.5 - 10.1 mg/dL    Glucose 119 (H) 70 - 99 mg/dL    GFR Estimate >90 >60 mL/min/1.73m2   Hepatic Panel [LAB20]    Collection Time: 09/16/21  7:01 AM   Result Value Ref Range    Bilirubin Total 1.6 (H) 0.2 - 1.3 mg/dL    Bilirubin Direct 0.5 (H) 0.0 - 0.2 mg/dL    Protein Total 6.1 (L) 6.8 - 8.8 g/dL    Albumin 3.3 (L) 3.4 - 5.0 g/dL    Alkaline Phosphatase 214 (H) 40 - 150 U/L    AST 66 (H) 0 - 45 U/L    ALT 54 0 - 70 U/L      IMPRESSION:  Mr. Martinez has well-compensated cirrhosis.  He is status post TIPS with an excellent response without any evidence of ascites.  He does have some abnormalities on his ultrasound, which we investigated with an MRI 2 years ago and did not show anything.   These observations though have increased in size and I will get another MRI now.  Otherwise, I will not be making any other change to his medical regimen.  I will see him back in the clinic for repeat imaging and blood work in 6 months.    Thank you very much for allowing me to participate in the care of this patient.  If you have any questions regarding my recommendations, please do not hesitate to contact me.         Juancho Willard MD      Professor of Medicine  [employer] [employer] [facility] [employer]      Executive Medical Director, Solid Organ Transplant Program  [facility]

## 2021-09-28 ENCOUNTER — HOSPITAL ENCOUNTER (OUTPATIENT)
Dept: MRI IMAGING | Facility: CLINIC | Age: 66
Discharge: HOME | End: 2021-09-28
Attending: INTERNAL MEDICINE | Admitting: INTERNAL MEDICINE
Payer: COMMERCIAL

## 2021-09-28 DIAGNOSIS — K74.60 CIRRHOSIS OF LIVER WITHOUT ASCITES, UNSPECIFIED HEPATIC CIRRHOSIS TYPE (H): ICD-10-CM

## 2021-09-28 PROCEDURE — 74183 MRI ABD W/O CNTR FLWD CNTR: CPT | Mod: 26 | Performed by: RADIOLOGY

## 2021-09-28 PROCEDURE — A9585 GADOBUTROL INJECTION: HCPCS | Performed by: INTERNAL MEDICINE

## 2021-09-28 PROCEDURE — 74183 MRI ABD W/O CNTR FLWD CNTR: CPT

## 2021-09-28 PROCEDURE — 255N000002 HC RX 255 OP 636: Performed by: INTERNAL MEDICINE

## 2021-09-28 RX ORDER — GADOBUTROL 604.72 MG/ML
8.5 INJECTION INTRAVENOUS ONCE
Status: COMPLETED | OUTPATIENT
Start: 2021-09-28 | End: 2021-09-28

## 2021-09-28 RX ADMIN — GADOBUTROL 8.5 ML: 604.72 INJECTION INTRAVENOUS at 07:27

## 2021-10-01 ENCOUNTER — DOCUMENTATION ONLY (OUTPATIENT)
Dept: TRANSPLANT | Facility: CLINIC | Age: 66
End: 2021-10-01

## 2021-10-01 ENCOUNTER — REFERRAL (OUTPATIENT)
Dept: TRANSPLANT | Facility: CLINIC | Age: 66
End: 2021-10-01

## 2021-10-01 DIAGNOSIS — Z86.39 PERSONAL HISTORY OF OTHER ENDOCRINE, NUTRITIONAL AND METABOLIC DISEASE: ICD-10-CM

## 2021-10-01 DIAGNOSIS — E11.9 DIABETES MELLITUS, TYPE 2 (H): ICD-10-CM

## 2021-10-01 DIAGNOSIS — C22.0 HEPATOCELLULAR CARCINOMA (H): ICD-10-CM

## 2021-10-01 DIAGNOSIS — C22.0 HCC (HEPATOCELLULAR CARCINOMA) (H): Primary | ICD-10-CM

## 2021-10-01 DIAGNOSIS — Z11.59 ENCOUNTER FOR SCREENING FOR OTHER VIRAL DISEASES: ICD-10-CM

## 2021-10-01 DIAGNOSIS — B19.20 HEPATITIS C: ICD-10-CM

## 2021-10-01 DIAGNOSIS — Z01.810 ENCOUNTER FOR PREPROCEDURAL CARDIOVASCULAR EXAMINATION: ICD-10-CM

## 2021-10-01 DIAGNOSIS — M81.0 AGE-RELATED OSTEOPOROSIS WITHOUT CURRENT PATHOLOGICAL FRACTURE: ICD-10-CM

## 2021-10-01 DIAGNOSIS — Z12.5 ENCOUNTER FOR SCREENING FOR MALIGNANT NEOPLASM OF PROSTATE: ICD-10-CM

## 2021-10-01 DIAGNOSIS — Z13.6 ENCOUNTER FOR SCREENING FOR CARDIOVASCULAR DISORDERS: ICD-10-CM

## 2021-10-01 DIAGNOSIS — Z79.84 LONG TERM (CURRENT) USE OF ORAL HYPOGLYCEMIC DRUGS: ICD-10-CM

## 2021-10-01 DIAGNOSIS — M81.8 OTHER OSTEOPOROSIS WITHOUT CURRENT PATHOLOGICAL FRACTURE: ICD-10-CM

## 2021-10-01 NOTE — PROGRESS NOTES
Patient discussed at interdisciplinary tumor conference, imaging from 9/28 reviewed    MRI per report:   3.1 cm heterogeneously arterially enhancing lesion in the central  hepatic segment 4A/B with washout, pseudocapsule formation and mild  diffusion restriction, LIRADS 5.  3. Subcentimeter arterially enhancing observation in hepatic segment  6, LIRADS 3.    Will begin transplant evaluation

## 2021-10-01 NOTE — Clinical Note
Intake- Please start a transplant referral on this patient per Dr. Willard, HCC diagnosis per tumor conference today.  Please do not contact this patient until I have confirmation from Dr. Willard if he would like to discuss with patient first.  Thanks.    Roxanna ROBERTSON, starting transplant referral/eval on this patient.

## 2021-10-01 NOTE — LETTER
10/29/2021    Mehdi Martinez  [facility] [address] [place] [address]      Dear Mehdi,    Thank you for your interest in the Transplant Center at [facility]. We look forward to being a part of your care team and assisting you through the transplant process.    As we discussed, your transplant coordinator is Evy Tamez (Liver).  You may call your coordinator at any time with questions or concerns call [phone].    Please complete the following.    1. Fill out and return the enclosed forms    Authorization for Electronic Communication    Authorization to Discuss Protected Health Information    Authorization for Release of Protected Health Information    Authorization for Care Everywhere Release of Information    2. Sign up for:    [employer], access to your electronic medical record (see enclosed pamphlet)    [email or web address], a transplant education website    You can use these tools to learn more about your transplant, communicate with your care team, and track your medical details      Sincerely,  Solid Organ Transplant  [facility]    cc: Referring Physician and PCP

## 2021-10-07 DIAGNOSIS — C22.0 HCC (HEPATOCELLULAR CARCINOMA) (H): Primary | ICD-10-CM

## 2021-10-07 NOTE — PROGRESS NOTES
New dx HCC, Dr. Willard discussed with patient.    Per Dr. Willard's message, patient to see IR first then determine if proceeding with full transplant eval.  Will re-assess after that appointment.  Order placed for IR referral.

## 2021-10-10 NOTE — TELEPHONE ENCOUNTER
DIAGNOSIS: new hcv   DATE RECEIVED: 10.26.21   NOTES STATUS DETAILS   OFFICE NOTE from referring provider Internal 10.7.21 MICHELLE Willard [facility] SOT   More in Epic if needed   OFFICE NOTE from other specialist N/A    DISCHARGE SUMMARY from hospital N/A    DISCHARGE REPORT from the ER N/A    OPERATIVE REPORT N/A    MEDICATION LIST Internal    XRAYS (IMAGES & REPORTS) Internal 9.28.21 MR ab   9.16.21 US ab  3.18.21 US ab  More in Epic if needed   PATHOLOGY  Slides & report N/A

## 2021-10-26 ENCOUNTER — PRE VISIT (OUTPATIENT)
Dept: RADIOLOGY | Facility: CLINIC | Age: 66
End: 2021-10-26

## 2021-10-27 ENCOUNTER — VIRTUAL VISIT (OUTPATIENT)
Dept: VASCULAR SURGERY | Facility: CLINIC | Age: 66
End: 2021-10-27
Payer: COMMERCIAL

## 2021-10-27 DIAGNOSIS — C22.0 HCC (HEPATOCELLULAR CARCINOMA) (H): Primary | ICD-10-CM

## 2021-10-27 PROCEDURE — 99214 OFFICE O/P EST MOD 30 MIN: CPT | Mod: 95 | Performed by: RADIOLOGY

## 2021-10-27 NOTE — PROGRESS NOTES
"[facility]      Type of Visit: Virtual: Unable to complete video visit    Patient is here for a new visit to discuss Consultation for liver disease.     Vitals - Patient Reported  Weight (Patient Reported): 87.5 kg (193 lb)  Height (Patient Reported): 181.6 cm (5' 11.5\")  BMI (Based on Pt Reported Ht/Wt): 26.54  Pain Score: No Pain (0)          Questions patient would like addressed today are: NA    Refills are needed: No    How would you like to obtain your AVS? Mail a copy    Josephine Parish CMA  "

## 2021-10-27 NOTE — LETTER
"10/27/2021       RE: Mehdi Martinez  [address]  [place] [address]     Dear Colleague,    Thank you for referring your patient, Mehdi Martinez, to the [facility] at [facility]. Please see a copy of my visit note below.    [facility]      Type of Visit: Virtual: Unable to complete video visit    Patient is here for a new visit to discuss Consultation for liver disease.     Vitals - Patient Reported  Weight (Patient Reported): 87.5 kg (193 lb)  Height (Patient Reported): 181.6 cm (5' 11.5\")  BMI (Based on Pt Reported Ht/Wt): 26.54  Pain Score: No Pain (0)          Questions patient would like addressed today are: NA    Refills are needed: No    How would you like to obtain your AVS? Mail a copy    Josephine Parish CMA    Mr. Mehdi Martinez is interviewed via telephone virtual visit for discussion of liver directed therapy for newly discovered HCC on MRI. He has cirrhosis due to chronic hepatitis C. I placed a TIPS for Mr. Martinez back in 2016 for refractory ascites, which has functioned very well for him controlling his ascites as he has not required any therapeutic paracentesis for quite some time.     Overall, he is doing well with no major complaints. No abdominal pain, nausea/diarrhea, GI bleeding, or overt signs of encephalopathy. He completes all his activities of daily living independently and lives with his spouse. It appears he is being considered for placement on the liver transplant list given the recent discovery of HCC in his liver.    PE: deffered/virtual visit    Labs:    Na: 145  Cr: 0.68  Albumin: 3.3  Total protein: 1.6  Alk Phos: 214  ALT: 54  AST: 66  Total bilirubin: 1.6    AFP: 5.9    WBC: 22  Platelet: 93    INR: 1.21    Imaging: I reviewed the MRI from 9/28/21 which demonstrates a 2.9 cm heterogenously enhancing mass in segment 4 of the liver.    A/P:   MELD 10  CPT 6(A)    67 yo cirrhotic " with BCLC A, not a surgical candidate, hopefully will be on transplant list, with unifocal ~3 cm HCC in segment 4. AFP not elevated. Has a functional TIPS. cTACE not a great option given presence of functioning TIPS and theoretical risk of hepatic ischemia. Ablation would be a risk for central bile duct injury. Therefore I believe transarterial radio embolization is the best choice for liver directed therapy for tumor control and treatment. We talked about possible outcomes in regards to tumor response, bridge to transplant, and preventing intrahepatic and extrahepatic spread. We talked about most common side effects post treatment.     Mr. Martinez would like to schedule treatment after Thanksgiving. We will plan to perform TARE of the segment 4 lesion with Sirspheres.        Again, thank you for allowing me to participate in the care of your patient.      Sincerely,    Rohan Lai MD

## 2021-10-28 ENCOUNTER — TELEPHONE (OUTPATIENT)
Dept: VASCULAR SURGERY | Facility: CLINIC | Age: 66
End: 2021-10-28

## 2021-10-28 NOTE — TELEPHONE ENCOUNTER
Called pt to fup on him s/p consult for Y90.    We talked about insurance and consent.  I do have pt's verbal consent to start the process.    We talked about scheduling.  He would prefer after Thanksgiving due to deer hunting season.    I will see what I can do to start the process and then get back to him on what his insurance states.    Also explained that he will need to get covid testing and a  to go home for both days.    He verbalized understanding.     We will remain in touch.    .  Jana LIZAMA RN, BSN  Interventional Radiology/Vascular  Nurse Coordinator   Phone: [phone]  Fax: [phone]

## 2021-10-29 VITALS — BODY MASS INDEX: 27.02 KG/M2 | HEIGHT: 71 IN | WEIGHT: 193 LBS

## 2021-10-29 ASSESSMENT — MIFFLIN-ST. JEOR: SCORE: 1682.57

## 2021-10-29 NOTE — TELEPHONE ENCOUNTER
Patient was asked the following questions during liver intake call.     Referring Provider: Dr. Marlo Hoang   Referring Diagnosis: HCC  PCP: Dr. David Heller     1)Do you know why you have liver disease: Yes        If Alcoholic Cirrhosis is present when was your last drink: 9/2015       Have you ever been through treatment for alcohol: No  2) Presence of Ascites: No Paracentesis: No  3) Presence of Hepatic Encephalopathy: No Medications: No  4) History of GI Bleeding: No  5) Oxygen Use: None  6) EGD: Yes Where: [facility] When: 2016  7) Colonoscopy: Yes Where: [facility] When: 2021  8) MELD Score: 13  9) Labs available for review from PCP/GI:   10) HCC Diagnosis: Yes                    Abdominal CT: Requested    MRI:Yes, in chart     Bone Scan: requested    PET: Requested    Chest CT: Requested    Treatment Notes w/ Images: Requested  11)Insurance information: Aetna/Medicare       Policy bone: Self       Subscriber/policy/ID number: [id number]      Group Number: [id number]    Referral intake process completed.  Patient is aware that after financial approval is received, medical records will be requested.   Patient confirmed for a callback from transplant coordinator on 11/4/2021.  Tentative evaluation date TBD.    Confirmed coordinator will discuss evaluation process in more detail at the time of their call.   Patient is aware of the need to arrange age appropriate cancer screening, vaccinations, and dental care.  Reminded patient to complete questionnaire, complete medical records release, and review packet prior to evaluation visit .  Assessed patient for special needs (ie--wheelchair, assistance, guardian, and ):  None  Patient instructed to call [phone] with questions.     Patient gave verbal consent during intake call to obtain medical records and documents outside of [facility]:  Yes     SEYMOUR Barahona, LPN       Solid Organ Transplant

## 2021-11-01 DIAGNOSIS — K70.31 ALCOHOLIC CIRRHOSIS OF LIVER WITH ASCITES (H): Primary | ICD-10-CM

## 2021-11-01 DIAGNOSIS — C22.0 HCC (HEPATOCELLULAR CARCINOMA) (H): ICD-10-CM

## 2021-11-03 NOTE — PROGRESS NOTES
Mr. Mehdi Martinez is interviewed via telephone virtual visit for discussion of liver directed therapy for newly discovered HCC on MRI. He has cirrhosis due to chronic hepatitis C. I placed a TIPS for Mr. Martinez back in 2016 for refractory ascites, which has functioned very well for him controlling his ascites as he has not required any therapeutic paracentesis for quite some time.     Overall, he is doing well with no major complaints. No abdominal pain, nausea/diarrhea, GI bleeding, or overt signs of encephalopathy. He completes all his activities of daily living independently and lives with his spouse. It appears he is being considered for placement on the liver transplant list given the recent discovery of HCC in his liver.    PE: deffered/virtual visit    Labs:    Na: 145  Cr: 0.68  Albumin: 3.3  Total protein: 1.6  Alk Phos: 214  ALT: 54  AST: 66  Total bilirubin: 1.6    AFP: 5.9    WBC: 22  Platelet: 93    INR: 1.21    Imaging: I reviewed the MRI from 9/28/21 which demonstrates a 2.9 cm heterogenously enhancing mass in segment 4 of the liver.    A/P:   MELD 10  CPT 6(A)    65 yo cirrhotic with BCLC A, not a surgical candidate, hopefully will be on transplant list, with unifocal ~3 cm HCC in segment 4. AFP not elevated. Has a functional TIPS. cTACE not a great option given presence of functioning TIPS and theoretical risk of hepatic ischemia. Ablation would be a risk for central bile duct injury. Therefore I believe transarterial radio embolization is the best choice for liver directed therapy for tumor control and treatment. We talked about possible outcomes in regards to tumor response, bridge to transplant, and preventing intrahepatic and extrahepatic spread. We talked about most common side effects post treatment.     Mr. Martinez would like to schedule treatment after Thanksgiving. We will plan to perform TARE of the segment 4 lesion with Sirspheres.

## 2021-11-04 NOTE — TELEPHONE ENCOUNTER
LIVER DISEASE HCC, Hep C        Current patient of Dr. Willard  -----------------------------------------------------------------------------------------------------------------------------  MELD 10 (HCC)    ABO O    HISTORY OF LIVER DISEASE  Hep C dx 6-7 years ago, evidence of cirrhosis at this that time.  Had significant ascites, TIPS procedure about 6 years ago; was well compensated but them started having a little balance issues, slight confusion and new lesion on MRI Sept 2021, consistent with HCC    MRI 9/28/21 3.1 cm lesion LIRADS 5, planned for radioembolization in December with IR here.        Ascites  On furosemide, s/p TIPS 5-6 years ago, no issues with ascites since TIPS    HE- on rifaximin, well controlled;  Lactulose is rx'd but patient is not taking  Kidney function  Variceal screening Last EGD 2016, none since    Alcohol use no etoh since Hep C dx 6+ years ago    Hospitalizations none reported except for hip and shoulder surgeries  ---------------------------------------------------------------------------------------------------------------------------------  PMH  New tremors, not been worked up to date  Diabetes yes, DM2 on metformin   Abdominal surgery - only TIPS  Colonoscopy last done Sept 2021, repeat in 5 years  Quit smoking at 19 years old other than still occasional cigar use, advised to stop   Vaccinated for Hep A/B and covid    LUIZX  , lives with wife and one adult grandson in [place], [place]; retired , worked with chemicals; other family mostly in [place]  ---------------------------------------------------------------------------------------------------------------------------------  LDLT Discussed yes    PLAN eval 11/30, patient has conflict with Tuesdays before that date and this date works best;

## 2021-11-09 ENCOUNTER — TELEPHONE (OUTPATIENT)
Dept: VASCULAR SURGERY | Facility: CLINIC | Age: 66
End: 2021-11-09
Payer: COMMERCIAL

## 2021-11-09 ENCOUNTER — TEAM CONFERENCE (OUTPATIENT)
Dept: VASCULAR SURGERY | Facility: CLINIC | Age: 66
End: 2021-11-09
Payer: COMMERCIAL

## 2021-11-09 NOTE — TELEPHONE ENCOUNTER
Terence Pate at [facility] the predetermination request for patient: [id number] has been approved. Please see the  approval details below:     PREDETERMINATION APPROVDE: [employer]  Approval#: [id number]  Date Span: 11/04/2021-0/505/2022  CPT Code: 86256, 00925, 42191, 70054, 64594, 81766, 37870, 11146  , , , 15153, 78867, 21234, 02630, 28495,  54348, 86553, 87340, 40126 All x 3     PLEASE NOTE: The [employer] Pre-determination Service is provided as a courtesy and solely for informational  purposes. It does not constitute a guarantee of payment. Payor decision to cover the treatment claim can only be  made by the payor and when all claim information is received and reviewed by the payor in accordance with all  terms, conditions, and provisions of the patients health plan including, but not limited to, exclusions, and benefits  covered by the applicable health insurance plan.

## 2021-11-09 NOTE — TELEPHONE ENCOUNTER
Called pt and informed him that we have him approved for his Y90 treatment    Informed him that I will mail out letter    Informed him   Mon 12/13 Mapping to check in at 7am     Delivery Tues 12/14 to check in at 7am for a 9am start time    He is to make sure he has a  and to also make sure he has covid testing 4 days prior to    Will send out letter again and if he has any questions, he is to call me.             Jana LIZAMA RN, BSN  Interventional Radiology/Vascular  Nurse Coordinator   Phone: [phone]  Fax: [phone]

## 2021-11-09 NOTE — TELEPHONE ENCOUNTER
Called pt to fu on his Y90 approval  Left a msg  Informed him that he is approved.    I do have more information for him and asked that he return my call.    Left direct line    Jana LIZAMA RN, BSN  Interventional Radiology/Vascular  Nurse Coordinator   Phone: [phone]  Fax: [phone]

## 2021-11-10 NOTE — TELEPHONE ENCOUNTER
RECORDS RECEIVED FROM:   DATE RECEIVED:   NOTES STATUS DETAILS   OFFICE NOTE from referring provider    Internal SOT   OFFICE NOTE from other cardiologist    N/A    DISCHARGE SUMMARY from hospital    N/A    DISCHARGE REPORT from the ER   N/A    OPERATIVE REPORT    N/A    MEDICATION LIST   Internal    LABS     BMP   Internal 9-16-21   CBC   Internal 9-16-21   CMP   N/A    Lipids   N/A    TSH   N/A    DIAGNOSTIC PROCEDURES     EKG   In process Scheduled 11/30   Monitor Reports   N/A    IMAGING (DISC & REPORT)      Echo   In process Scheduled 11/30   Stress Tests   In process Scheduled 11/30   Cath   N/A    MRI/MRA   N/A    CT/CTA   In process Scheduled 11/30

## 2021-11-14 DIAGNOSIS — Z11.59 ENCOUNTER FOR SCREENING FOR OTHER VIRAL DISEASES: ICD-10-CM

## 2021-11-15 DIAGNOSIS — Z11.59 ENCOUNTER FOR SCREENING FOR OTHER VIRAL DISEASES: ICD-10-CM

## 2021-11-29 ENCOUNTER — HOSPITAL ENCOUNTER (OUTPATIENT)
Dept: CARDIOLOGY | Facility: CLINIC | Age: 66
End: 2021-11-29
Attending: INTERNAL MEDICINE
Payer: COMMERCIAL

## 2021-11-29 ENCOUNTER — HOSPITAL ENCOUNTER (OUTPATIENT)
Dept: NUCLEAR MEDICINE | Facility: CLINIC | Age: 66
Setting detail: NUCLEAR MEDICINE
End: 2021-11-29
Attending: INTERNAL MEDICINE
Payer: COMMERCIAL

## 2021-11-29 ENCOUNTER — HOSPITAL ENCOUNTER (OUTPATIENT)
Dept: ULTRASOUND IMAGING | Facility: CLINIC | Age: 66
End: 2021-11-29
Attending: INTERNAL MEDICINE
Payer: COMMERCIAL

## 2021-11-29 ENCOUNTER — HOSPITAL ENCOUNTER (OUTPATIENT)
Dept: NUCLEAR MEDICINE | Facility: CLINIC | Age: 66
Setting detail: NUCLEAR MEDICINE
Discharge: HOME | End: 2021-11-29
Attending: INTERNAL MEDICINE | Admitting: INTERNAL MEDICINE
Payer: COMMERCIAL

## 2021-11-29 ENCOUNTER — HOSPITAL ENCOUNTER (OUTPATIENT)
Dept: CT IMAGING | Facility: CLINIC | Age: 66
End: 2021-11-29
Attending: INTERNAL MEDICINE
Payer: COMMERCIAL

## 2021-11-29 DIAGNOSIS — C22.0 HEPATOCELLULAR CARCINOMA (H): ICD-10-CM

## 2021-11-29 DIAGNOSIS — B19.20 HEPATITIS C: ICD-10-CM

## 2021-11-29 DIAGNOSIS — C22.0 HCC (HEPATOCELLULAR CARCINOMA) (H): ICD-10-CM

## 2021-11-29 DIAGNOSIS — Z01.810 ENCOUNTER FOR PREPROCEDURAL CARDIOVASCULAR EXAMINATION: ICD-10-CM

## 2021-11-29 DIAGNOSIS — E11.9 DIABETES MELLITUS, TYPE 2 (H): ICD-10-CM

## 2021-11-29 PROCEDURE — 255N000002 HC RX 255 OP 636: Performed by: STUDENT IN AN ORGANIZED HEALTH CARE EDUCATION/TRAINING PROGRAM

## 2021-11-29 PROCEDURE — 76700 US EXAM ABDOM COMPLETE: CPT

## 2021-11-29 PROCEDURE — 71250 CT THORAX DX C-: CPT | Mod: 26 | Performed by: RADIOLOGY

## 2021-11-29 PROCEDURE — 93975 VASCULAR STUDY: CPT | Mod: 26 | Performed by: STUDENT IN AN ORGANIZED HEALTH CARE EDUCATION/TRAINING PROGRAM

## 2021-11-29 PROCEDURE — A9503 TC99M MEDRONATE: HCPCS | Performed by: INTERNAL MEDICINE

## 2021-11-29 PROCEDURE — 78306 BONE IMAGING WHOLE BODY: CPT

## 2021-11-29 PROCEDURE — 250N000009 HC RX 250: Performed by: STUDENT IN AN ORGANIZED HEALTH CARE EDUCATION/TRAINING PROGRAM

## 2021-11-29 PROCEDURE — 93350 STRESS TTE ONLY: CPT | Mod: 26 | Performed by: STUDENT IN AN ORGANIZED HEALTH CARE EDUCATION/TRAINING PROGRAM

## 2021-11-29 PROCEDURE — 71250 CT THORAX DX C-: CPT

## 2021-11-29 PROCEDURE — 343N000001 HC RX 343: Performed by: INTERNAL MEDICINE

## 2021-11-29 PROCEDURE — 93018 CV STRESS TEST I&R ONLY: CPT | Performed by: STUDENT IN AN ORGANIZED HEALTH CARE EDUCATION/TRAINING PROGRAM

## 2021-11-29 PROCEDURE — 93325 DOPPLER ECHO COLOR FLOW MAPG: CPT | Mod: 26 | Performed by: STUDENT IN AN ORGANIZED HEALTH CARE EDUCATION/TRAINING PROGRAM

## 2021-11-29 PROCEDURE — 93016 CV STRESS TEST SUPVJ ONLY: CPT | Performed by: STUDENT IN AN ORGANIZED HEALTH CARE EDUCATION/TRAINING PROGRAM

## 2021-11-29 PROCEDURE — 78306 BONE IMAGING WHOLE BODY: CPT | Mod: 26 | Performed by: RADIOLOGY

## 2021-11-29 PROCEDURE — C8928 TTE W OR W/O FOL W/CON,STRES: HCPCS

## 2021-11-29 PROCEDURE — 250N000011 HC RX IP 250 OP 636: Performed by: STUDENT IN AN ORGANIZED HEALTH CARE EDUCATION/TRAINING PROGRAM

## 2021-11-29 PROCEDURE — 93321 DOPPLER ECHO F-UP/LMTD STD: CPT | Mod: 26 | Performed by: STUDENT IN AN ORGANIZED HEALTH CARE EDUCATION/TRAINING PROGRAM

## 2021-11-29 RX ORDER — TC 99M MEDRONATE 20 MG/10ML
25 INJECTION, POWDER, LYOPHILIZED, FOR SOLUTION INTRAVENOUS ONCE
Status: COMPLETED | OUTPATIENT
Start: 2021-11-29 | End: 2021-11-29

## 2021-11-29 RX ORDER — METOPROLOL TARTRATE 1 MG/ML
1-20 INJECTION, SOLUTION INTRAVENOUS
Status: DISCONTINUED | OUTPATIENT
Start: 2021-11-29 | End: 2021-11-29 | Stop reason: HOSPADM

## 2021-11-29 RX ORDER — SODIUM CHLORIDE 9 MG/ML
INJECTION, SOLUTION INTRAVENOUS CONTINUOUS
Status: DISCONTINUED | OUTPATIENT
Start: 2021-11-29 | End: 2021-11-29 | Stop reason: HOSPADM

## 2021-11-29 RX ORDER — ATROPINE SULFATE 0.4 MG/ML
.2-2 AMPUL (ML) INJECTION
Status: DISCONTINUED | OUTPATIENT
Start: 2021-11-29 | End: 2021-11-30 | Stop reason: HOSPADM

## 2021-11-29 RX ORDER — DOBUTAMINE HYDROCHLORIDE 200 MG/100ML
10-50 INJECTION INTRAVENOUS CONTINUOUS
Status: DISCONTINUED | OUTPATIENT
Start: 2021-11-29 | End: 2021-11-29 | Stop reason: HOSPADM

## 2021-11-29 RX ADMIN — DOBUTAMINE IN DEXTROSE 10 MCG/KG/MIN: 200 INJECTION, SOLUTION INTRAVENOUS at 12:57

## 2021-11-29 RX ADMIN — METOPROLOL TARTRATE 5 MG: 1 INJECTION, SOLUTION INTRAVENOUS at 13:14

## 2021-11-29 RX ADMIN — TC 99M MEDRONATE 25.4 MCI.: 20 INJECTION, POWDER, LYOPHILIZED, FOR SOLUTION INTRAVENOUS at 11:34

## 2021-11-29 RX ADMIN — HUMAN ALBUMIN MICROSPHERES AND PERFLUTREN 7 ML: 10; .22 INJECTION, SOLUTION INTRAVENOUS at 13:13

## 2021-11-29 NOTE — PROGRESS NOTES
Pt here for dobutamine stress test.  Test, meds and side effects reviewed with patient.  Achieved target HR at 40 mcg Dobutamine and a total of 0.6mg IV atropine.  Gave a total of 5mg IV Metoprolol to bring HR back to baseline.  Post monitoring complete and VSS.  Pt escorted out to the gold waiting room.  P

## 2021-11-29 NOTE — PROGRESS NOTES
[facility] Solid Organ Transplant  Outpatient MNT: Liver Transplant Evaluation    Current BMI: 28.2 (HT 71 in,  lbs/89 kg)  BMI is within recommendation of <45 for liver transplant    Fried Frailty-- Not Frail (1/5 points) scored for reduced      FraiLT, screenshot-- Frail-->question this result as pt seemed rather robust, although he did report 'off balance' which may have impacted chair stand time  [email or web address]         Time Spent: 15 minutes  Visit Type: Initial   Referring Physician: Barbra   Pt accompanied by: self     History of previous txp: none     Nutrition Assessment  - Appetite: good/baseline   - Food allergies/intolerances: none   - Meal prep & grocery shopping: wife does   - Issues chewing or swallowing: no   - N/V/D/C: no   - Food access concerns: no     Vitamins, Supplements, Pertinent Meds: zinc, folic acid   Herbal Medicines/Supplements: none   Protein supplement: protein bars    Edema: none     Weight hx: overall stable     Diet Recall  Breakfast Cheerios or eggs w/ 1 mercado and toast    Lunch Leftovers    Dinner Tacos; burritos; enchiladas; roast w/ veggies; tuna fish s/w w/ chips   Snacks Frozen yogurt    Beverages Water, coffee   Dining out Not regularly      Physical Activity  [place] river and back daily 1.5 hours at least    Nutrition Diagnosis  No nutrition diagnosis identified at this time    Nutrition Intervention  Nutrition education provided:  Discussed sodium intake (low sodium foods and drinks, seasoning food without salt and tips for low sodium diet).    Reviewed adequate protein intake. Encouraged receiving protein from both animal and plant based sources.     Reviewed post txp diet guidelines in brief (will review in further detail post txp):  (1) Review of proper food safety measures d/t immunosuppressant therapy post-op and increased risk for food-borne illness    (2) Avoid the following post txp d/t risk for rejection, unknown  effects on the organs, and/or potential interactions with immunosuppressants:  - Herbal, Chinese, holistic, chiropractic, natural, alternative medicines and supplements  - Detoxes and cleanses  - Weight loss pills  - Protein powders or other products with extracts or herbs (ie green tea extract)    (3) Med regimen and possible side effects    Patient Understanding: Pt verbalized understanding of education provided.  Expected Engagement: Good  Follow-Up Plans: PRN     Nutrition Goals  No nutrition goals identified at this time    Cassandra Blackmon, RD, LD, CCTD

## 2021-11-30 ENCOUNTER — OFFICE VISIT (OUTPATIENT)
Dept: CARDIOLOGY | Facility: CLINIC | Age: 66
End: 2021-11-30
Attending: INTERNAL MEDICINE
Payer: COMMERCIAL

## 2021-11-30 ENCOUNTER — LAB (OUTPATIENT)
Dept: LAB | Facility: CLINIC | Age: 66
End: 2021-11-30
Attending: STUDENT IN AN ORGANIZED HEALTH CARE EDUCATION/TRAINING PROGRAM
Payer: COMMERCIAL

## 2021-11-30 ENCOUNTER — COMMITTEE REVIEW (OUTPATIENT)
Dept: TRANSPLANT | Facility: CLINIC | Age: 66
End: 2021-11-30

## 2021-11-30 ENCOUNTER — PRE VISIT (OUTPATIENT)
Dept: CARDIOLOGY | Facility: CLINIC | Age: 66
End: 2021-11-30
Payer: COMMERCIAL

## 2021-11-30 ENCOUNTER — ALLIED HEALTH/NURSE VISIT (OUTPATIENT)
Dept: TRANSPLANT | Facility: CLINIC | Age: 66
End: 2021-11-30
Attending: INTERNAL MEDICINE
Payer: COMMERCIAL

## 2021-11-30 VITALS
OXYGEN SATURATION: 96 % | HEIGHT: 69 IN | HEART RATE: 80 BPM | SYSTOLIC BLOOD PRESSURE: 131 MMHG | DIASTOLIC BLOOD PRESSURE: 62 MMHG | WEIGHT: 193.8 LBS | BODY MASS INDEX: 28.71 KG/M2

## 2021-11-30 VITALS
SYSTOLIC BLOOD PRESSURE: 155 MMHG | DIASTOLIC BLOOD PRESSURE: 70 MMHG | HEART RATE: 74 BPM | WEIGHT: 197 LBS | OXYGEN SATURATION: 96 % | HEIGHT: 70 IN | BODY MASS INDEX: 28.2 KG/M2

## 2021-11-30 DIAGNOSIS — B19.20 HEPATITIS C: ICD-10-CM

## 2021-11-30 DIAGNOSIS — Z13.6 ENCOUNTER FOR SCREENING FOR CARDIOVASCULAR DISORDERS: Primary | ICD-10-CM

## 2021-11-30 DIAGNOSIS — C22.0 HCC (HEPATOCELLULAR CARCINOMA) (H): ICD-10-CM

## 2021-11-30 DIAGNOSIS — C22.0 HEPATOCELLULAR CARCINOMA (H): ICD-10-CM

## 2021-11-30 DIAGNOSIS — M81.8 OTHER OSTEOPOROSIS WITHOUT CURRENT PATHOLOGICAL FRACTURE: ICD-10-CM

## 2021-11-30 DIAGNOSIS — Z11.59 ENCOUNTER FOR SCREENING FOR OTHER VIRAL DISEASES: ICD-10-CM

## 2021-11-30 DIAGNOSIS — E11.9 TYPE 2 DIABETES MELLITUS WITHOUT COMPLICATION, WITH LONG-TERM CURRENT USE OF INSULIN (H): ICD-10-CM

## 2021-11-30 DIAGNOSIS — Z86.39 PERSONAL HISTORY OF OTHER ENDOCRINE, NUTRITIONAL AND METABOLIC DISEASE: ICD-10-CM

## 2021-11-30 DIAGNOSIS — Z79.84 LONG TERM (CURRENT) USE OF ORAL HYPOGLYCEMIC DRUGS: ICD-10-CM

## 2021-11-30 DIAGNOSIS — B19.20 HEPATITIS C VIRUS INFECTION WITHOUT HEPATIC COMA, UNSPECIFIED CHRONICITY: ICD-10-CM

## 2021-11-30 DIAGNOSIS — K70.31 ALCOHOLIC CIRRHOSIS OF LIVER WITH ASCITES (H): Primary | ICD-10-CM

## 2021-11-30 DIAGNOSIS — Z79.4 TYPE 2 DIABETES MELLITUS WITHOUT COMPLICATION, WITH LONG-TERM CURRENT USE OF INSULIN (H): ICD-10-CM

## 2021-11-30 DIAGNOSIS — Z12.5 ENCOUNTER FOR SCREENING FOR MALIGNANT NEOPLASM OF PROSTATE: ICD-10-CM

## 2021-11-30 LAB
ABO/RH(D): NORMAL
AFP SERPL-MCNC: 7 UG/L (ref 0–8)
ALBUMIN SERPL-MCNC: 3.2 G/DL (ref 3.4–5)
ALBUMIN UR-MCNC: NEGATIVE MG/DL
ALP SERPL-CCNC: 230 U/L (ref 40–150)
ALT SERPL W P-5'-P-CCNC: 41 U/L (ref 0–70)
ANION GAP SERPL CALCULATED.3IONS-SCNC: 7 MMOL/L (ref 3–14)
APPEARANCE UR: CLEAR
AST SERPL W P-5'-P-CCNC: 67 U/L (ref 0–45)
BILIRUB DIRECT SERPL-MCNC: 0.5 MG/DL (ref 0–0.2)
BILIRUB SERPL-MCNC: 1.8 MG/DL (ref 0.2–1.3)
BILIRUB UR QL STRIP: NEGATIVE
BUN SERPL-MCNC: 10 MG/DL (ref 7–30)
CALCIUM SERPL-MCNC: 9.1 MG/DL (ref 8.5–10.1)
CAOX CRY #/AREA URNS HPF: ABNORMAL /HPF
CHLORIDE BLD-SCNC: 110 MMOL/L (ref 94–109)
CHOLEST SERPL-MCNC: 182 MG/DL
CMV IGG SERPL IA-ACNC: >10 U/ML
CMV IGG SERPL IA-ACNC: ABNORMAL
CO2 SERPL-SCNC: 26 MMOL/L (ref 20–32)
COLOR UR AUTO: YELLOW
CREAT SERPL-MCNC: 0.74 MG/DL (ref 0.66–1.25)
CREAT UR-MCNC: 204 MG/DL
DEPRECATED CALCIDIOL+CALCIFEROL SERPL-MC: 26 UG/L (ref 20–75)
EBV VCA IGG SER IA-ACNC: >750 U/ML
EBV VCA IGG SER IA-ACNC: POSITIVE
ERYTHROCYTE [DISTWIDTH] IN BLOOD BY AUTOMATED COUNT: 14.3 % (ref 10–15)
FASTING STATUS PATIENT QL REPORTED: YES
FERRITIN SERPL-MCNC: 37 NG/ML (ref 26–388)
GFR SERPL CREATININE-BSD FRML MDRD: >90 ML/MIN/1.73M2
GLUCOSE BLD-MCNC: 133 MG/DL (ref 70–99)
GLUCOSE UR STRIP-MCNC: NEGATIVE MG/DL
HAV IGG SER QL IA: NONREACTIVE
HBA1C MFR BLD: 6.4 % (ref 0–5.6)
HBV CORE AB SERPL QL IA: NONREACTIVE
HBV SURFACE AB SERPL IA-ACNC: 102.1 M[IU]/ML
HBV SURFACE AG SERPL QL IA: NONREACTIVE
HCT VFR BLD AUTO: 45.8 % (ref 40–53)
HDLC SERPL-MCNC: 72 MG/DL
HGB BLD-MCNC: 15.5 G/DL (ref 13.3–17.7)
HGB UR QL STRIP: NEGATIVE
HIV 1+2 AB+HIV1 P24 AG SERPL QL IA: NONREACTIVE
HOLD SPECIMEN: NORMAL
INR PPP: 1.25 (ref 0.85–1.15)
IRON SATN MFR SERPL: 35 % (ref 15–46)
IRON SERPL-MCNC: 116 UG/DL (ref 35–180)
KETONES UR STRIP-MCNC: NEGATIVE MG/DL
LDLC SERPL CALC-MCNC: 90 MG/DL
LEUKOCYTE ESTERASE UR QL STRIP: NEGATIVE
MCH RBC QN AUTO: 31.7 PG (ref 26.5–33)
MCHC RBC AUTO-ENTMCNC: 33.8 G/DL (ref 31.5–36.5)
MCV RBC AUTO: 94 FL (ref 78–100)
MUCOUS THREADS #/AREA URNS LPF: PRESENT /LPF
NITRATE UR QL: NEGATIVE
NONHDLC SERPL-MCNC: 110 MG/DL
PH UR STRIP: 5 [PH] (ref 5–7)
PHOSPHATE SERPL-MCNC: 3.4 MG/DL (ref 2.5–4.5)
PLATELET # BLD AUTO: 99 10E3/UL (ref 150–450)
POTASSIUM BLD-SCNC: 4.4 MMOL/L (ref 3.4–5.3)
PROT SERPL-MCNC: 6.2 G/DL (ref 6.8–8.8)
PROT UR-MCNC: <0.05 G/L
PROT/CREAT 24H UR: NORMAL MG/G{CREAT}
PSA SERPL-MCNC: 1.93 UG/L (ref 0–4)
RBC # BLD AUTO: 4.89 10E6/UL (ref 4.4–5.9)
RBC URINE: 1 /HPF
SODIUM SERPL-SCNC: 143 MMOL/L (ref 133–144)
SP GR UR STRIP: 1.02 (ref 1–1.03)
SPECIMEN EXPIRATION DATE: NORMAL
T PALLIDUM AB SER QL: NONREACTIVE
TIBC SERPL-MCNC: 336 UG/DL (ref 240–430)
TRANSFERRIN SERPL-MCNC: 268 MG/DL (ref 210–360)
TRIGL SERPL-MCNC: 99 MG/DL
TSH SERPL DL<=0.005 MIU/L-ACNC: 1.49 MU/L (ref 0.4–4)
UROBILINOGEN UR STRIP-MCNC: NORMAL MG/DL
WBC # BLD AUTO: 18.6 10E3/UL (ref 4–11)
WBC URINE: 1 /HPF

## 2021-11-30 PROCEDURE — 86665 EPSTEIN-BARR CAPSID VCA: CPT | Performed by: INTERNAL MEDICINE

## 2021-11-30 PROCEDURE — 80053 COMPREHEN METABOLIC PANEL: CPT | Performed by: PATHOLOGY

## 2021-11-30 PROCEDURE — 85610 PROTHROMBIN TIME: CPT | Performed by: PATHOLOGY

## 2021-11-30 PROCEDURE — 84443 ASSAY THYROID STIM HORMONE: CPT | Performed by: PATHOLOGY

## 2021-11-30 PROCEDURE — 86481 TB AG RESPONSE T-CELL SUSP: CPT | Performed by: INTERNAL MEDICINE

## 2021-11-30 PROCEDURE — 86708 HEPATITIS A ANTIBODY: CPT | Performed by: INTERNAL MEDICINE

## 2021-11-30 PROCEDURE — 83550 IRON BINDING TEST: CPT | Performed by: PATHOLOGY

## 2021-11-30 PROCEDURE — 86803 HEPATITIS C AB TEST: CPT | Performed by: INTERNAL MEDICINE

## 2021-11-30 PROCEDURE — 82728 ASSAY OF FERRITIN: CPT | Performed by: PATHOLOGY

## 2021-11-30 PROCEDURE — 36415 COLL VENOUS BLD VENIPUNCTURE: CPT | Performed by: PATHOLOGY

## 2021-11-30 PROCEDURE — G0103 PSA SCREENING: HCPCS | Performed by: PATHOLOGY

## 2021-11-30 PROCEDURE — 84100 ASSAY OF PHOSPHORUS: CPT | Performed by: PATHOLOGY

## 2021-11-30 PROCEDURE — 86704 HEP B CORE ANTIBODY TOTAL: CPT | Performed by: INTERNAL MEDICINE

## 2021-11-30 PROCEDURE — 85027 COMPLETE CBC AUTOMATED: CPT | Performed by: PATHOLOGY

## 2021-11-30 PROCEDURE — G0463 HOSPITAL OUTPT CLINIC VISIT: HCPCS | Mod: 25

## 2021-11-30 PROCEDURE — 87340 HEPATITIS B SURFACE AG IA: CPT | Performed by: INTERNAL MEDICINE

## 2021-11-30 PROCEDURE — 83036 HEMOGLOBIN GLYCOSYLATED A1C: CPT | Performed by: PATHOLOGY

## 2021-11-30 PROCEDURE — 86644 CMV ANTIBODY: CPT | Performed by: INTERNAL MEDICINE

## 2021-11-30 PROCEDURE — 80321 ALCOHOLS BIOMARKERS 1OR 2: CPT | Mod: 90 | Performed by: PATHOLOGY

## 2021-11-30 PROCEDURE — 99000 SPECIMEN HANDLING OFFICE-LAB: CPT | Performed by: PATHOLOGY

## 2021-11-30 PROCEDURE — 86900 BLOOD TYPING SEROLOGIC ABO: CPT | Performed by: STUDENT IN AN ORGANIZED HEALTH CARE EDUCATION/TRAINING PROGRAM

## 2021-11-30 PROCEDURE — 99204 OFFICE O/P NEW MOD 45 MIN: CPT | Performed by: INTERNAL MEDICINE

## 2021-11-30 PROCEDURE — 86780 TREPONEMA PALLIDUM: CPT | Performed by: INTERNAL MEDICINE

## 2021-11-30 PROCEDURE — 99207 PR NO CHARGE COORDINATED CARE PS: CPT

## 2021-11-30 PROCEDURE — 82248 BILIRUBIN DIRECT: CPT | Performed by: PATHOLOGY

## 2021-11-30 PROCEDURE — 84466 ASSAY OF TRANSFERRIN: CPT | Performed by: INTERNAL MEDICINE

## 2021-11-30 PROCEDURE — 99205 OFFICE O/P NEW HI 60 MIN: CPT | Performed by: TRANSPLANT SURGERY

## 2021-11-30 PROCEDURE — 82105 ALPHA-FETOPROTEIN SERUM: CPT | Performed by: INTERNAL MEDICINE

## 2021-11-30 PROCEDURE — 80061 LIPID PANEL: CPT | Performed by: PATHOLOGY

## 2021-11-30 PROCEDURE — 82306 VITAMIN D 25 HYDROXY: CPT | Performed by: INTERNAL MEDICINE

## 2021-11-30 PROCEDURE — 86706 HEP B SURFACE ANTIBODY: CPT | Performed by: INTERNAL MEDICINE

## 2021-11-30 ASSESSMENT — MIFFLIN-ST. JEOR
SCORE: 1684.84
SCORE: 1652.83

## 2021-11-30 ASSESSMENT — PAIN SCALES - GENERAL: PAINLEVEL: NO PAIN (0)

## 2021-11-30 NOTE — PROGRESS NOTES
"Psychosocial Assessment for Liver Transplant Evaluation  Mehdi Martinez was seen in the Transplant Center as part of his evaluation as a potential liver transplant recipient.  He attended his appointments alone today.  Living Situation: Mehdi lives with his wife Tameka in a house in [place], [place].  Their nineteen year old grandson Abel moved in with them three months ago.  Mehdi reports he manages his own medications and personal cares.  He denies non-compliance with medication.   Education/Employment:  Mehdi graduated high school and completed some college (one to two years) at the [employer].  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  Mehdi retired around the age of [age].  Financial /Income: Mehdi reports having multiple sources of income, including a pension from [employer].  Mehdi and his wife both receive Social Security intermediate benefits.  Health Insurance:  Aetna Medicare Advantage.   Mehdi uses [facility] for his prescriptions.  This writer talked with Mehdi about the financial risks of transplant, particularly about the high cost of transplant related medications and the importance of maintaining adequate health insurance coverage.  Family/Social Support: Mehdi and his wife Peggy \"Tameka\" were  in .  Mehdi did identify her as his primary care giver, but reports she did not want to come to today's appointments.  Mehdi and his wife have two daughters.  Marielle and Dixie both live in [place] and work from [employer].    Mehdi' parents are .  His mother  when he was [age] years old, and father  when he was [age] years old.  Mehdi has one brother and three sisters who all live out [facility] ([place], [place] and [place]).  This writer stressed the importance of having a stable and involved support network before and after transplant.  Provided Mehdi  with education about the relationship " between a stable support system and better surgical and post-transplant outcomes compared to patients with a limited support system.    Functional Status: There are no functional concerns.  Mehdi reports he walks daily, five to ten miles per day on average.    Chemical Dependency:  Mehdi quit smoking cigarettes eighteen years ago.  He has been smoking a cigar approximately once weekly.  He understands our team's recommendation to abstain from cigars.  Mehdi reports he had been using marijuana once or twice a week, and quit 1-2 months ago.  He denies any other illicit drug use.    Mehdi has abstained from alcohol for six years and three months.  He reports he had been consuming a case of beer per week from the ages of [age].  This was his heaviest period of alcohol consumption.  Mehdi has no history of chemical dependency treatment or legal consequences of his use.  Mental Health: Mehdi denies any mental health history.  He denies any history of suicidal ideation or hospitalization for mental health treatment.  REGINALD-7 score today: 0  PHQ-9 score today: 0  Adjustment to Illness: Mehdi was diagnosed with hepatitis C six years ago, and hepatocellular carcinoma in September of 2021.  Mehdi agreeable to having a liver transplant if it is recommended by our team.  This writer provided Mehdi  with supportive counseling throughout this interview.  This writer also encouraged Mehdi to attend the liver transplant support group for additional support and encouragement.   Impression/Recommendations:   Mehdi verbalizes understanding the psychosocial risks of transplant and teaching provided during this evaluation.  Mehdi came alone today.  It will be important to engage his wife Emmie in future appointments and discussions.  Mehdi has identified her as his primary care giver.  I will reach out to her to provide care giver education and verify her willingness and ability to provide post transplant care giving.  Mehdi  reports he has abstained from alcohol for the past six years and three months.  He does report consuming twenty-four beers per week from the ages of [age].  Mehdi has no history of chemical dependency treatment.  A substance use assessment will not be required unless patient's PEth test is positive.    Mehdi has adequate finances and health insurance for transplant.  This writer will remain available to assist patient throughout the evaluation process and will follow patient through transplant if he is listed.  It was a pleasure to evaluate this patient for liver transplant.   Teaching completed during assessment:  1.     Housing and relocation needs post transplant.  2.     Caregiver needs post transplant.  3.     Financial issues related to transplant.  4.     Risks of alcohol use post transplant.  5.     Common psychosocial stressors pre/post transplant.          6.     Liver Transplant support group availability.          7.     [employer] Directive-form brought to clinic today, forwarded to [facility]             Psychosocial Risks of Transplant Reviewed:  1.     Increased stress related to your emotional, family, social, employment, or   financial situation.  2.     Affect on work and/or disability benefits.  3.     Affect on future health and life insurance.  4.     Transplant outcome expectations may not be met.  5.     Mental Health risks: anxiety, depression, PTSD, guilt, grief and chronic fatigue.     JUAN Braga

## 2021-11-30 NOTE — LETTER
"  11/30/2021     RE: Mehdi Martinez  [address] [facility] [address]    Dear Colleague,    Thank you for referring your patient, Mehdi Martinez, to the [facility] TRANSPLANT [facility]. Please see a copy of my visit note below.    \"Much or all of the text in this note was generated through the use of Dragon Dictate voice to text software. Errors in spelling or words which appear to be out of context are unintentional, may be present due having escaped editing\"    Assessment and Plan:  1. liver transplant evaluation - patient is a good candidate overall. Benefits and surgical risks of a liver transplantation were discussed.  2.  End stage liver disease due to Laennec's    Surgical evaluation:  1. Portal Vein:Patent  2. Hepatic Artery: Open  3. TIPS: absent  4. Previous Abdominal Surgery: Yes laparoscopy and umbilical hernia  5. Hepatocellular Carcinoma: Yes - within Dwight criteria  6. Ascites: Present - minimal  7. Costal Angle: narrow  8. Portopulmonary Hypertension: absent  9. Hepatopulmonary Syndrome: absent  10. Cardiac Evaluation: needs stress echocardiogram and a full cardiac catheterization  11. Nutritional Status: Good  12. Diabetes: yes, for many years  13.Hypertension no  14. Smoker:in the past  14: Fraility index:no  15. Meets guidelines to receive Living Donor  Yes -  ....  16. Potential Living donors No    Recommendations and assessment:    Patient is an excellent surgical candidate.  He has patent vasculature.  He needs his hepatocellular carcinoma ablated.  He does have longstanding diabetes and needs a full cardiac work-up including a cardiac catheterization.  He needs hematology consultation..      Patients overall evaluation will be discussed at the Liver Transplant selection committee meeting with a final recommendation on the patients suitability for transplant to be made at that time.    Consult Full  Details:  Mehdi Martinez was seen in consultation at the request of Dr. Willard.  " For evaluation as a potential liver transplant recipient.    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    HPI:  Presenting complaint: Liver cancer    Patient has been diagnosed to have cirrhosis of the liver about 5 years ago.  The etiology is thought to be Laënnec cirrhosis versus chronic hepatitis C.  He is currently HCVRNA negative.  He had refractory ascites and received a TIPS.  His ascites has since greatly improved.  In September of this year he was diagnosed to have a 3.1 cm liver cancer.  He is scheduled to receive a liver directed therapy.    He has had diabetes for several years no history of ischemic heart disease and does have some aortic calcification.        Past Medical History:   Diagnosis Date     Alcohol abuse     quit September 2015     Allergic rhinitis      Aortic calcification (H)      Asthma      Cancer (H)      Chronic hepatitis C with cirrhosis (H)      Diabetes mellitus, type II (H)      Diverticulosis      HTN (hypertension)      Portal hypertension (H)      SBP (spontaneous bacterial peritonitis) (H) Jan 2016     Splenomegaly      Past Surgical History:   Procedure Laterality Date     ARTHROSCOPY SHOULDER  90s     HC ESOPHAGOSCOPY, DIAGNOSTIC  Jan 2015    small varices, portal HTN gastropathy in the stomach, otherwise normal     HERNIA REPAIR      inguinal     HERNIORRHAPHY UMBILICAL N/A 8/18/2016    Procedure: HERNIORRHAPHY UMBILICAL;  Surgeon: Yadira Lutz MD;  Location: [facility] OR     KNEE SURGERY  80s     LAPAROSCOPY DIAGNOSTIC (GENERAL) N/A 1/28/2016    Procedure: LAPAROSCOPY DIAGNOSTIC (GENERAL);  Surgeon: Jeannine Schneider MD;  Location: [facility] OR     laproscopic cholecystectomy  Nov 2015    liver biopsy performed at the same time     Past Surgical History:   Procedure Laterality Date     ARTHROSCOPY SHOULDER  90s     HC ESOPHAGOSCOPY, DIAGNOSTIC  Jan  2015    small varices, portal HTN gastropathy in the stomach, otherwise normal     HERNIA REPAIR      inguinal     HERNIORRHAPHY UMBILICAL N/A 8/18/2016    Procedure: HERNIORRHAPHY UMBILICAL;  Surgeon: Yadira Lutz MD;  Location: [facility] OR     KNEE SURGERY  80s     LAPAROSCOPY DIAGNOSTIC (GENERAL) N/A 1/28/2016    Procedure: LAPAROSCOPY DIAGNOSTIC (GENERAL);  Surgeon: Jeannine Schneider MD;  Location: [facility] OR     laproscopic cholecystectomy  Nov 2015    liver biopsy performed at the same time     Family History   Problem Relation Age of Onset     Colon Cancer Mother      Ovarian Cancer Sister      Allergies   Allergen Reactions     Artemisia Absinthium Difficulty breathing     Acetaminophen Other (See Comments)     Other reaction(s): Avoids due to cirrhosis, hepatitis C  Avoids acetaminophen due to cirrhosis, hepatitis c       Mold      Molds & Smuts      Other reaction(s): Other, see comments  No reaction documented     Other Environmental Allergy Other (See Comments)     Ragweed, no reactions were documented     Ragweeds      Sulfa Drugs Itching     Terfenadine Itching     Itchy rash     Prior to Admission medications    Medication Sig Start Date End Date Taking? Authorizing Provider   aMILoride (MIDAMOR) 5 MG tablet Take 2 tablets (10 mg) by mouth daily 3/28/18  Yes Juancho Willard MD   cetirizine (ZYRTEC) 10 MG tablet Take 10 mg by mouth Reported on 3/6/2017   Yes Reported, Patient   fluticasone (FLONASE) 50 MCG/ACT nasal spray 2 sprays Reported on 3/6/2017   Yes Reported, Patient   folic acid (FOLVITE) 1 MG tablet 1 mg daily 2/26/20  Yes Reported, Patient   metFORMIN (GLUCOPHAGE) 1000 MG tablet Take 1,000 mg by mouth daily (with dinner)    Yes Unknown, Entered By History   methotrexate 2.5 MG tablet 2.5 mg once a week 6 tablets once per week 2/20/20  Yes Reported, Patient   Pseudoephedrine-APAP  MG TABS Take 300 mg by mouth daily   Yes Reported, Patient   rifaximin (XIFAXAN) 550 MG TABS tablet  "Take 1 tablet (550 mg) by mouth 2 times daily 8/2/21  Yes Juancho Willard MD   tiZANidine (ZANAFLEX) 4 MG tablet Take 2-8 mg by mouth 6/23/20  Yes Reported, Patient   zinc sulfate (ZINCATE) 220 MG capsule Take 50 mg by mouth daily   Yes Reported, Patient   aspirin (ASA) 325 MG tablet Take 650 mg by mouth  Patient not taking: Reported on 9/16/2021 8/10/18   Reported, Patient   furosemide (LASIX) 40 MG tablet Take 2 tablets (80 mg) by mouth daily  Patient not taking: Reported on 3/19/2019 9/6/16   Juancho Willard MD   lactulose (CHRONULAC) 10 GM/15ML solution Take 30 mLs (20 g) by mouth 3 times daily Ensure you have 3 loose BMs/day.  Patient not taking: Reported on 3/20/2018 11/3/16   Juancho Willard MD   Rivaroxaban (XARELTO PO) Take 10 mg by mouth daily  Patient not taking: Reported on 9/16/2021    Reported, Patient       Previous Transplant Hx: No    Cardiovascular Hx:       h/o Cardiac Issues: No       Exercise Tolerance: no chest pain or shortness of breath with exertion.    Potential Donor(s): No    ROS:    REVIEW OF SYSTEMS (check box if normal)  [x]                GENERAL  [x]                  PULMONARY [x]                 GENITOURINARY  [x]                 CNS                 [x]                  CARDIAC  [x]                  ENDOCRINE  [x]                 EARS,NOSE,THROAT [x]                  GASTROINTESTINAL [x]                  NEUROLOGIC    [x]                 MUSCLOSKELTAL  [x]                   HEMATOLOGY    Examination:     Vitals:  BP (!) 155/70   Pulse 74   Ht 1.778 m (5' 10\")   Wt 89.4 kg (197 lb)   SpO2 96%   BMI 28.27 kg/m      GENERAL APPEARANCE: alert and no distress  EYES: PERRL  HENT: mouth without ulcers or lesions  NECK: supple, no adenopathy  RESP: lungs clear to auscultation - no rales, rhonchi or wheezes  CV: regular rhythm, normal rate, no rub   ABDOMEN:  soft, nontender, no HSM or masses and bowel sounds normal; thank you very appreciated Mehdi taylor  MS: extremities normal- no " gross deformities noted, no evidence of inflammation in joints, no muscle tenderness  SKIN: no rash  NEURO: Normal strength and tone, sensory exam grossly normal, mentation intact and speech normal  PSYCH: mentation appears normal. and affect normal/bright      Results:   Recent Results (from the past 168 hour(s))   Lipid Profile    Collection Time: 11/30/21  7:52 AM   Result Value Ref Range    Cholesterol 182 <200 mg/dL    Triglycerides 99 <150 mg/dL    Direct Measure HDL 72 >=40 mg/dL    LDL Cholesterol Calculated 90 <=100 mg/dL    Non HDL Cholesterol 110 <130 mg/dL    Patient Fasting > 8hrs? Yes    Basic metabolic panel    Collection Time: 11/30/21  7:52 AM   Result Value Ref Range    Sodium 143 133 - 144 mmol/L    Potassium 4.4 3.4 - 5.3 mmol/L    Chloride 110 (H) 94 - 109 mmol/L    Carbon Dioxide (CO2) 26 20 - 32 mmol/L    Anion Gap 7 3 - 14 mmol/L    Urea Nitrogen 10 7 - 30 mg/dL    Creatinine 0.74 0.66 - 1.25 mg/dL    Calcium 9.1 8.5 - 10.1 mg/dL    Glucose 133 (H) 70 - 99 mg/dL    GFR Estimate >90 >60 mL/min/1.73m2   Hepatic panel    Collection Time: 11/30/21  7:52 AM   Result Value Ref Range    Bilirubin Total 1.8 (H) 0.2 - 1.3 mg/dL    Bilirubin Direct 0.5 (H) 0.0 - 0.2 mg/dL    Protein Total 6.2 (L) 6.8 - 8.8 g/dL    Albumin 3.2 (L) 3.4 - 5.0 g/dL    Alkaline Phosphatase 230 (H) 40 - 150 U/L    AST 67 (H) 0 - 45 U/L    ALT 41 0 - 70 U/L   Ferritin    Collection Time: 11/30/21  7:52 AM   Result Value Ref Range    Ferritin 37 26 - 388 ng/mL   Hemoglobin A1c    Collection Time: 11/30/21  7:52 AM   Result Value Ref Range    Hemoglobin A1C 6.4 (H) 0.0 - 5.6 %   Iron and iron binding capacity    Collection Time: 11/30/21  7:52 AM   Result Value Ref Range    Iron 116 35 - 180 ug/dL    Iron Binding Capacity 336 240 - 430 ug/dL    Iron Sat Index 35 15 - 46 %   Phosphorus    Collection Time: 11/30/21  7:52 AM   Result Value Ref Range    Phosphorus 3.4 2.5 - 4.5 mg/dL   Prostate spec antigen screen    Collection  Time: 11/30/21  7:52 AM   Result Value Ref Range    Prostate Specific Antigen Screen 1.93 0.00 - 4.00 ug/L   TSH with free T4 reflex    Collection Time: 11/30/21  7:52 AM   Result Value Ref Range    TSH 1.49 0.40 - 4.00 mU/L   Transferrin    Collection Time: 11/30/21  7:52 AM   Result Value Ref Range    Transferrin 268 210 - 360 mg/dL   INR    Collection Time: 11/30/21  7:52 AM   Result Value Ref Range    INR 1.25 (H) 0.85 - 1.15   CBC with platelets    Collection Time: 11/30/21  7:52 AM   Result Value Ref Range    WBC Count 18.6 (H) 4.0 - 11.0 10e3/uL    RBC Count 4.89 4.40 - 5.90 10e6/uL    Hemoglobin 15.5 13.3 - 17.7 g/dL    Hematocrit 45.8 40.0 - 53.0 %    MCV 94 78 - 100 fL    MCH 31.7 26.5 - 33.0 pg    MCHC 33.8 31.5 - 36.5 g/dL    RDW 14.3 10.0 - 15.0 %    Platelet Count 99 (L) 150 - 450 10e3/uL   UA reflex to Microscopic and Culture    Collection Time: 11/30/21  7:52 AM    Specimen: Urine, Midstream   Result Value Ref Range    Color Urine Yellow Colorless, Straw, Light Yellow, Yellow    Appearance Urine Clear Clear    Glucose Urine Negative Negative mg/dL    Bilirubin Urine Negative Negative    Ketones Urine Negative Negative mg/dL    Specific Gravity Urine 1.021 1.003 - 1.035    Blood Urine Negative Negative    pH Urine 5.0 5.0 - 7.0    Protein Albumin Urine Negative Negative mg/dL    Urobilinogen Urine Normal Normal, 2.0 mg/dL    Nitrite Urine Negative Negative    Leukocyte Esterase Urine Negative Negative    Mucus Urine Present (A) None Seen /LPF    Calcium Oxalate Crystals Urine Few (A) None Seen /HPF    RBC Urine 1 <=2 /HPF    WBC Urine 1 <=5 /HPF   Protein  random urine with Creat Ratio    Collection Time: 11/30/21  7:52 AM   Result Value Ref Range    Total Protein Random Urine g/L <0.05 g/L    Total Protein Urine g/gr Creatinine      Creatinine Urine mg/dL 204 mg/dL   ABO and Rh    Collection Time: 11/30/21  7:55 AM   Result Value Ref Range    ABO/RH(D) O POS     SPECIMEN EXPIRATION DATE  [id number]    To be careful because  I had a long discussion with the patient regarding liver transplantation which included but was not limited to  the following points:    1. Liver transplant selection committee process.  2. The federal rules for cadaveric waiting list, the size and blood type matching of the organ. The availability of living-related donor transplantation.  3. The types of donors: brain death donors, non-heart beating donors, partial liver grafts: splits and living donor grafts  4. Extended criteria  Donors (older age, steasosis) and the increased  risk of primary non-function using the extended criteria donors  5. The CDC high risk donors,  Risk of donor transmitted infections and donor transmitted malignancy  6. The liver transplant operation and the associated risks and technical complications which can include intraoperative death, post operative death,  Primary non-function, bleeding requiring re-operations, arterial and biliary complications, bowel perforations, and intra abdominal abscess. Some of these complicaitons may require a second operation.  7. The postoperative course, the ICU stay and risk of postoperative complications which can include sepsis, MI, stroke, brain injury, pneumonia, pleural effusions, and renal dysfunction.  8. The current 1 year and 5 year graft and patient survivals.  9. The need for life long immunosuppressive therapy and the side effects of these medications, including the possibility of toxicity, opportunistic infections, risk of cancer including lymphoma, and the possibility of rejection even if the patient is taking the medication exactly as prescribed.  10. The need for compliance with medications and follow-up visits in the clinic and thereafter.  11. The patient and family understand these risks and wish to proceed to transplantation     Review of prior external note(s) from - Outside records from [facility] local notes  60 minutes spent on the date of the  encounter doing chart review, history and exam, documentation and further activities per the note    Again, thank you for allowing me to participate in the care of your patient.      Sincerely,    Sae Belle MD

## 2021-11-30 NOTE — NURSING NOTE
Chief Complaint   Patient presents with     New Patient     new - liver transplant eval     Vitals were taken and medications reconciled.    Abel Moya, EMT  11:57 AM

## 2021-11-30 NOTE — LETTER
11/30/2021      RE: Mehdi Martinez  [address] [facility] [address]       Dear Colleague,    Thank you for the opportunity to participate in the care of your patient, Mehdi Martinez, at the [facility] HEART CL[facility] [place] at [facility]. Please see a copy of my visit note below.       SUBJECTIVE:  Mehdi Martinez is a 66 year old male who presents for liver transplant evaluation.  End-stage liver disease due to alcoholic cirrhosis.  Status post TIPS.  Patient also has hepatocellular carcinoma, chronic hepatitis C, chronic lymphocytic leukemia cirrhosis and history of type 2 diabetes on Metformin for past 4 years.  Currently patient is not on any cardiac medication apart from diuretics.  Denied chest pain or shortness of breath.  According to him he is very active and even going for deer hunting.  Denied prior cardiac illness or current symptoms.  He is smoked during school days.  No significant family history of premature coronary artery disease.    Patient Active Problem List    Diagnosis Date Noted     HCC (hepatocellular carcinoma) (H) 11/01/2021     Priority: Medium     Umbilical hernia 08/18/2016     Priority: Medium     Alcoholic cirrhosis of liver with ascites (H) 04/16/2016     Priority: Medium     Chronic hepatitis C without hepatic coma (H) 04/06/2016     Priority: Medium     CLL (chronic lymphocytic leukemia) (H) 02/03/2016     Priority: Medium     Cirrhosis (H) 01/19/2016     Priority: Medium     Type II diabetes mellitus (H) 01/18/2014     Priority: Medium     Overview:   Type II or unspecified type diabetes mellitus without mention of complication, not stated as uncontrolled (HRC)      .  Current Outpatient Medications   Medication Sig     aMILoride (MIDAMOR) 5 MG tablet Take 2 tablets (10 mg) by mouth daily     cetirizine (ZYRTEC) 10 MG tablet Take 10 mg by mouth Reported on 3/6/2017     fluticasone (FLONASE) 50 MCG/ACT nasal spray 2 sprays  Reported on 3/6/2017     folic acid (FOLVITE) 1 MG tablet 1 mg daily     metFORMIN (GLUCOPHAGE) 1000 MG tablet Take 1,000 mg by mouth daily (with dinner)      methotrexate 2.5 MG tablet 2.5 mg once a week 6 tablets once per week     rifaximin (XIFAXAN) 550 MG TABS tablet Take 1 tablet (550 mg) by mouth 2 times daily     tiZANidine (ZANAFLEX) 4 MG tablet Take 2-8 mg by mouth     zinc sulfate (ZINCATE) 220 MG capsule Take 50 mg by mouth daily     aspirin (ASA) 325 MG tablet Take 650 mg by mouth (Patient not taking: Reported on 9/16/2021)     furosemide (LASIX) 40 MG tablet Take 2 tablets (80 mg) by mouth daily (Patient not taking: Reported on 3/19/2019)     lactulose (CHRONULAC) 10 GM/15ML solution Take 30 mLs (20 g) by mouth 3 times daily Ensure you have 3 loose BMs/day. (Patient not taking: Reported on 3/20/2018)     Pseudoephedrine-APAP  MG TABS Take 300 mg by mouth daily (Patient not taking: Reported on 11/30/2021)     Rivaroxaban (XARELTO PO) Take 10 mg by mouth daily (Patient not taking: Reported on 9/16/2021)     No current facility-administered medications for this visit.     Past Medical History:   Diagnosis Date     Alcohol abuse     quit September 2015     Allergic rhinitis      Aortic calcification (H)      Asthma      Cancer (H)      Chronic hepatitis C with cirrhosis (H)      Diabetes mellitus, type II (H)      Diverticulosis      HTN (hypertension)      Portal hypertension (H)      SBP (spontaneous bacterial peritonitis) (H) Jan 2016     Splenomegaly      Past Surgical History:   Procedure Laterality Date     ARTHROSCOPY SHOULDER  90s     HC ESOPHAGOSCOPY, DIAGNOSTIC  Jan 2015    small varices, portal HTN gastropathy in the stomach, otherwise normal     HERNIA REPAIR      inguinal     HERNIORRHAPHY UMBILICAL N/A 8/18/2016    Procedure: HERNIORRHAPHY UMBILICAL;  Surgeon: Yadira Lutz MD;  Location: [facility] OR     KNEE SURGERY  80s     LAPAROSCOPY DIAGNOSTIC (GENERAL) N/A 1/28/2016     Procedure: LAPAROSCOPY DIAGNOSTIC (GENERAL);  Surgeon: Jeannine Schneider MD;  Location: [facility] OR     laproscopic cholecystectomy  Nov 2015    liver biopsy performed at the same time     Allergies   Allergen Reactions     Artemisia Absinthium Difficulty breathing     Acetaminophen Other (See Comments)     Other reaction(s): Avoids due to cirrhosis, hepatitis C  Avoids acetaminophen due to cirrhosis, hepatitis c       Mold      Molds & Smuts      Other reaction(s): Other, see comments  No reaction documented     Other Environmental Allergy Other (See Comments)     Ragweed, no reactions were documented     Ragweeds      Sulfa Drugs Itching     Terfenadine Itching     Itchy rash     Social History     Socioeconomic History     Marital status:      Spouse name: Not on file     Number of children: Not on file     Years of education: Not on file     Highest education level: Not on file   Occupational History     Not on file   Tobacco Use     Smoking status: Current Some Day Smoker     Types: Cigars     Smokeless tobacco: Never Used   Substance and Sexual Activity     Alcohol use: No     Alcohol/week: 0.0 standard drinks     Comment: Hx of heavy beer drinking, None since Sept 2015     Drug use: No     Sexual activity: Not on file   Other Topics Concern     Parent/sibling w/ CABG, MI or angioplasty before 65F 55M? Not Asked   Social History Narrative     Not on file     Social Determinants of Health     Financial Resource Strain: Not on file   Food Insecurity: Not on file   Transportation Needs: Not on file   Physical Activity: Not on file   Stress: Not on file   Social Connections: Not on file   Intimate Partner Violence: Not on file   Housing Stability: Not on file     Family History   Problem Relation Age of Onset     Colon Cancer Mother      Ovarian Cancer Sister            REVIEW OF SYSTEMS:  General: negative, fever, chills, night sweats  Skin: negative, acne, rash and scaling  Eyes: negative, double vision, eye  "pain and photophobia  Ears/Nose/Throat: negative, nasal congestion and purulent rhinorrhea  Respiratory: No dyspnea on exertion, No cough, No hemoptysis and negative  Cardiovascular: negative, palpitations, tachycardia, irregular heart beat, chest pain, exertional chest pain or pressure, paroxysmal nocturnal dyspnea, dyspnea on exertion and orthopnea       OBJECTIVE:   Blood pressure 131/62, pulse 80, height 1.75 m (5' 8.9\"), weight 87.9 kg (193 lb 12.8 oz), SpO2 96 %.  General Appearance: alert, active and no distress  Head: Normocephalic. No masses, lesions, tenderness or abnormalities  Eyes: conjuctiva clear, PERRL, EOM intact  Ears: External ears normal. Canals clear. TM's normal.  Nose: Nares normal  Mouth: normal  Neck: Supple, no cervical adenopathy, no thyromegaly  Lungs: clear to auscultation  Cardiac: regular rate and rhythm, normal S1 and S2, no murmur         ASSESSMENT/PLAN:  Patient here for liver transplant evaluation.  End-stage liver disease due to alcoholic cirrhosis.  History of chronic hep C as well as hepatoma.  Patient also has a diagnosis of CLL.  According to patient he is very active with no cardiac symptoms.  Denied prior cardiac history.  His cardiac risk factors are remote history of smoking during school days and type 2 diabetes on treatment for 4 years.  His EKG reviewed normal sinus rhythm normal EKG.  Patient's dobutamine stress echocardiogram reviewed this was completely normal at target heart rate.  Normal pulmonary artery pressure.  His recent chest CT showed some minor coronary artery calcification.  In presence of normal dobutamine stress echo with calcification is not causing hemodynamic significance.  Patient may proceed with transplant.  If coronary artery calcification is a concern for the transplant team, will be able to arrange a coronary angiogram though it may not be necessary.  Per orders.   Return to Clinic as needed.  Total visit duration 45 minutes.  This include " face-to-face interview, physical exam, chart review, review of EKG echocardiogram, CT chest  and documentation.      Please do not hesitate to contact me if you have any questions/concerns.     Sincerely,     JOHANNY June MD

## 2021-11-30 NOTE — COMMITTEE REVIEW
Abdominal Committee Review Note     Evaluation Date: 11/30/2021  Committee Review Date: 11/30/2021    Organ being evaluated for: Liver    Transplant Phase: Evaluation  Transplant Status: Active    Transplant Coordinator: Evy Tamez  Transplant Surgeon:       Referring Physician: Marlo Hoang    Primary Diagnosis:   Secondary Diagnosis:     Committee Review Members:  Nutrition Cassandra Blackmon, RD   Pharmacist Trenton Rodarte, [facility]    - Clinical Astrid Lucero, EMILY, Lolis Lange, [facility]   Transplant Ella Chapa, DANNY, Swathi Lee LPN, Evy Tamez, RN, Kaylee Dupree, RN, Nighat Simon MD, Emilia Espinosa, APRN CNP, Tima Glaser MD, Tima Saenz MD, Theron Tenorio RN   Transplant Hepatology  Juancho Willard MD, Jana Winter MD, Kristy Jimenez MD, Thomas M. Leventhal, MD   Transplant Surgery Aleks Dillon MD, Sae Belle MD, Bo Bal MD, Akanksha Boateng MD       Transplant Eligibility: HCV, HCC    Committee Review Decision: Approved    Relative Contraindications:     Absolute Contraindications:     Committee Chair Jana Winter MD verbally attested to the committee's decision.    Committee Discussion Details:     - approved pending full eval including cardiac work-up    - will follow up with Heme here for transplant implications with well controlled CLL, should not delay listing

## 2021-11-30 NOTE — PROGRESS NOTES
SUBJECTIVE:  Mehdi Martinez is a 66 year old male who presents for liver transplant evaluation.  End-stage liver disease due to alcoholic cirrhosis.  Status post TIPS.  Patient also has hepatocellular carcinoma, chronic hepatitis C, chronic lymphocytic leukemia cirrhosis and history of type 2 diabetes on Metformin for past 4 years.  Currently patient is not on any cardiac medication apart from diuretics.  Denied chest pain or shortness of breath.  According to him he is very active and even going for deer hunting.  Denied prior cardiac illness or current symptoms.  He is smoked during school days.  No significant family history of premature coronary artery disease.    Patient Active Problem List    Diagnosis Date Noted     HCC (hepatocellular carcinoma) (H) 11/01/2021     Priority: Medium     Umbilical hernia 08/18/2016     Priority: Medium     Alcoholic cirrhosis of liver with ascites (H) 04/16/2016     Priority: Medium     Chronic hepatitis C without hepatic coma (H) 04/06/2016     Priority: Medium     CLL (chronic lymphocytic leukemia) (H) 02/03/2016     Priority: Medium     Cirrhosis (H) 01/19/2016     Priority: Medium     Type II diabetes mellitus (H) 01/18/2014     Priority: Medium     Overview:   Type II or unspecified type diabetes mellitus without mention of complication, not stated as uncontrolled (HRC)      .  Current Outpatient Medications   Medication Sig     aMILoride (MIDAMOR) 5 MG tablet Take 2 tablets (10 mg) by mouth daily     cetirizine (ZYRTEC) 10 MG tablet Take 10 mg by mouth Reported on 3/6/2017     fluticasone (FLONASE) 50 MCG/ACT nasal spray 2 sprays Reported on 3/6/2017     folic acid (FOLVITE) 1 MG tablet 1 mg daily     metFORMIN (GLUCOPHAGE) 1000 MG tablet Take 1,000 mg by mouth daily (with dinner)      methotrexate 2.5 MG tablet 2.5 mg once a week 6 tablets once per week     rifaximin (XIFAXAN) 550 MG TABS tablet Take 1 tablet (550 mg) by mouth 2 times daily     tiZANidine (ZANAFLEX)  4 MG tablet Take 2-8 mg by mouth     zinc sulfate (ZINCATE) 220 MG capsule Take 50 mg by mouth daily     aspirin (ASA) 325 MG tablet Take 650 mg by mouth (Patient not taking: Reported on 9/16/2021)     furosemide (LASIX) 40 MG tablet Take 2 tablets (80 mg) by mouth daily (Patient not taking: Reported on 3/19/2019)     lactulose (CHRONULAC) 10 GM/15ML solution Take 30 mLs (20 g) by mouth 3 times daily Ensure you have 3 loose BMs/day. (Patient not taking: Reported on 3/20/2018)     Pseudoephedrine-APAP  MG TABS Take 300 mg by mouth daily (Patient not taking: Reported on 11/30/2021)     Rivaroxaban (XARELTO PO) Take 10 mg by mouth daily (Patient not taking: Reported on 9/16/2021)     No current facility-administered medications for this visit.     Past Medical History:   Diagnosis Date     Alcohol abuse     quit September 2015     Allergic rhinitis      Aortic calcification (H)      Asthma      Cancer (H)      Chronic hepatitis C with cirrhosis (H)      Diabetes mellitus, type II (H)      Diverticulosis      HTN (hypertension)      Portal hypertension (H)      SBP (spontaneous bacterial peritonitis) (H) Jan 2016     Splenomegaly      Past Surgical History:   Procedure Laterality Date     ARTHROSCOPY SHOULDER  90s     HC ESOPHAGOSCOPY, DIAGNOSTIC  Jan 2015    small varices, portal HTN gastropathy in the stomach, otherwise normal     HERNIA REPAIR      inguinal     HERNIORRHAPHY UMBILICAL N/A 8/18/2016    Procedure: HERNIORRHAPHY UMBILICAL;  Surgeon: Yadira Lutz MD;  Location: [facility] OR     KNEE SURGERY  80s     LAPAROSCOPY DIAGNOSTIC (GENERAL) N/A 1/28/2016    Procedure: LAPAROSCOPY DIAGNOSTIC (GENERAL);  Surgeon: Jeannine Schneider MD;  Location: [facility] OR     laproscopic cholecystectomy  Nov 2015    liver biopsy performed at the same time     Allergies   Allergen Reactions     Artemisia Absinthium Difficulty breathing     Acetaminophen Other (See Comments)     Other reaction(s): Avoids due to  cirrhosis, hepatitis C  Avoids acetaminophen due to cirrhosis, hepatitis c       Mold      Molds & Smuts      Other reaction(s): Other, see comments  No reaction documented     Other Environmental Allergy Other (See Comments)     Ragweed, no reactions were documented     Ragweeds      Sulfa Drugs Itching     Terfenadine Itching     Itchy rash     Social History     Socioeconomic History     Marital status:      Spouse name: Not on file     Number of children: Not on file     Years of education: Not on file     Highest education level: Not on file   Occupational History     Not on file   Tobacco Use     Smoking status: Current Some Day Smoker     Types: Cigars     Smokeless tobacco: Never Used   Substance and Sexual Activity     Alcohol use: No     Alcohol/week: 0.0 standard drinks     Comment: Hx of heavy beer drinking, None since Sept 2015     Drug use: No     Sexual activity: Not on file   Other Topics Concern     Parent/sibling w/ CABG, MI or angioplasty before 65F 55M? Not Asked   Social History Narrative     Not on file     Social Determinants of Health     Financial Resource Strain: Not on file   Food Insecurity: Not on file   Transportation Needs: Not on file   Physical Activity: Not on file   Stress: Not on file   Social Connections: Not on file   Intimate Partner Violence: Not on file   Housing Stability: Not on file     Family History   Problem Relation Age of Onset     Colon Cancer Mother      Ovarian Cancer Sister            REVIEW OF SYSTEMS:  General: negative, fever, chills, night sweats  Skin: negative, acne, rash and scaling  Eyes: negative, double vision, eye pain and photophobia  Ears/Nose/Throat: negative, nasal congestion and purulent rhinorrhea  Respiratory: No dyspnea on exertion, No cough, No hemoptysis and negative  Cardiovascular: negative, palpitations, tachycardia, irregular heart beat, chest pain, exertional chest pain or pressure, paroxysmal nocturnal dyspnea, dyspnea on exertion  "and orthopnea       OBJECTIVE:   Blood pressure 131/62, pulse 80, height 1.75 m (5' 8.9\"), weight 87.9 kg (193 lb 12.8 oz), SpO2 96 %.  General Appearance: alert, active and no distress  Head: Normocephalic. No masses, lesions, tenderness or abnormalities  Eyes: conjuctiva clear, PERRL, EOM intact  Ears: External ears normal. Canals clear. TM's normal.  Nose: Nares normal  Mouth: normal  Neck: Supple, no cervical adenopathy, no thyromegaly  Lungs: clear to auscultation  Cardiac: regular rate and rhythm, normal S1 and S2, no murmur         ASSESSMENT/PLAN:  Patient here for liver transplant evaluation.  End-stage liver disease due to alcoholic cirrhosis.  History of chronic hep C as well as hepatoma.  Patient also has a diagnosis of CLL.  According to patient he is very active with no cardiac symptoms.  Denied prior cardiac history.  His cardiac risk factors are remote history of smoking during school days and type 2 diabetes on treatment for 4 years.  His EKG reviewed normal sinus rhythm normal EKG.  Patient's dobutamine stress echocardiogram reviewed this was completely normal at target heart rate.  Normal pulmonary artery pressure.  His recent chest CT showed some minor coronary artery calcification.  In presence of normal dobutamine stress echo with calcification is not causing hemodynamic significance.  Patient may proceed with transplant.  If coronary artery calcification is a concern for the transplant team, will be able to arrange a coronary angiogram though it may not be necessary.  Per orders.   Return to Clinic as needed.  Total visit duration 45 minutes.  This include face-to-face interview, physical exam, chart review, review of EKG echocardiogram, CT chest  and documentation.  "

## 2021-11-30 NOTE — PROGRESS NOTES
"HPI      ROS      Physical Exam    \"Much or all of the text in this note was generated through the use of Dragon Dictate voice to text software. Errors in spelling or words which appear to be out of context are unintentional, may be present due having escaped editing\"    Assessment and Plan:  1. liver transplant evaluation - patient is a good candidate overall. Benefits and surgical risks of a liver transplantation were discussed.  2.  End stage liver disease due to Laennec's    Surgical evaluation:  1. Portal Vein:Patent  2. Hepatic Artery: Open  3. TIPS: absent  4. Previous Abdominal Surgery: Yes laparoscopy and umbilical hernia  5. Hepatocellular Carcinoma: Yes - within Mehrdad criteria  6. Ascites: Present - minimal  7. Costal Angle: narrow  8. Portopulmonary Hypertension: absent  9. Hepatopulmonary Syndrome: absent  10. Cardiac Evaluation: needs stress echocardiogram and a full cardiac catheterization  11. Nutritional Status: Good  12. Diabetes: yes, for many years  13.Hypertension no  14. Smoker:in the past  14: Fraility index:no  15. Meets guidelines to receive Living Donor  Yes -  ....  16. Potential Living donors No    Recommendations and assessment:    Patient is an excellent surgical candidate.  He has patent vasculature.  He needs his hepatocellular carcinoma ablated.  He does have longstanding diabetes and needs a full cardiac work-up including a cardiac catheterization.  He needs hematology consultation..      Patients overall evaluation will be discussed at the Liver Transplant selection committee meeting with a final recommendation on the patients suitability for transplant to be made at that time.    Consult Full  Details:  Mehdi Martinez was seen in consultation at the request of Dr. Willard.  For evaluation as a potential liver transplant recipient.    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    HPI:  Presenting complaint: Liver cancer    Patient has been diagnosed to have cirrhosis of the liver about 5 years ago.  The etiology is thought to be Laënnec cirrhosis versus chronic hepatitis C.  He is currently HCVRNA negative.  He had refractory ascites and received a TIPS.  His ascites has since greatly improved.  In September of this year he was diagnosed to have a 3.1 cm liver cancer.  He is scheduled to receive a liver directed therapy.    He has had diabetes for several years no history of ischemic heart disease and does have some aortic calcification.        Past Medical History:   Diagnosis Date     Alcohol abuse     quit September 2015     Allergic rhinitis      Aortic calcification (H)      Asthma      Cancer (H)      Chronic hepatitis C with cirrhosis (H)      Diabetes mellitus, type II (H)      Diverticulosis      HTN (hypertension)      Portal hypertension (H)      SBP (spontaneous bacterial peritonitis) (H) Jan 2016     Splenomegaly      Past Surgical History:   Procedure Laterality Date     ARTHROSCOPY SHOULDER  90s     HC ESOPHAGOSCOPY, DIAGNOSTIC  Jan 2015    small varices, portal HTN gastropathy in the stomach, otherwise normal     HERNIA REPAIR      inguinal     HERNIORRHAPHY UMBILICAL N/A 8/18/2016    Procedure: HERNIORRHAPHY UMBILICAL;  Surgeon: Yadira Lutz MD;  Location: [facility] OR     KNEE SURGERY  80s     LAPAROSCOPY DIAGNOSTIC (GENERAL) N/A 1/28/2016    Procedure: LAPAROSCOPY DIAGNOSTIC (GENERAL);  Surgeon: Jeannine Schneider MD;  Location: [facility] OR     laproscopic cholecystectomy  Nov 2015    liver biopsy performed at the same time     Past Surgical History:   Procedure Laterality Date     ARTHROSCOPY SHOULDER  90s     HC ESOPHAGOSCOPY, DIAGNOSTIC  Jan 2015    small varices, portal HTN gastropathy in the stomach, otherwise normal     HERNIA REPAIR      inguinal     HERNIORRHAPHY UMBILICAL N/A 8/18/2016    Procedure: HERNIORRHAPHY UMBILICAL;  Surgeon:  Yadira Lutz MD;  Location: [facility] OR     KNEE SURGERY  80s     LAPAROSCOPY DIAGNOSTIC (GENERAL) N/A 1/28/2016    Procedure: LAPAROSCOPY DIAGNOSTIC (GENERAL);  Surgeon: Jeannine Schneider MD;  Location: [facility] OR     laproscopic cholecystectomy  Nov 2015    liver biopsy performed at the same time     Family History   Problem Relation Age of Onset     Colon Cancer Mother      Ovarian Cancer Sister      Allergies   Allergen Reactions     Artemisia Absinthium Difficulty breathing     Acetaminophen Other (See Comments)     Other reaction(s): Avoids due to cirrhosis, hepatitis C  Avoids acetaminophen due to cirrhosis, hepatitis c       Mold      Molds & Smuts      Other reaction(s): Other, see comments  No reaction documented     Other Environmental Allergy Other (See Comments)     Ragweed, no reactions were documented     Ragweeds      Sulfa Drugs Itching     Terfenadine Itching     Itchy rash     Prior to Admission medications    Medication Sig Start Date End Date Taking? Authorizing Provider   aMILoride (MIDAMOR) 5 MG tablet Take 2 tablets (10 mg) by mouth daily 3/28/18  Yes Juancho Willard MD   cetirizine (ZYRTEC) 10 MG tablet Take 10 mg by mouth Reported on 3/6/2017   Yes Reported, Patient   fluticasone (FLONASE) 50 MCG/ACT nasal spray 2 sprays Reported on 3/6/2017   Yes Reported, Patient   folic acid (FOLVITE) 1 MG tablet 1 mg daily 2/26/20  Yes Reported, Patient   metFORMIN (GLUCOPHAGE) 1000 MG tablet Take 1,000 mg by mouth daily (with dinner)    Yes Unknown, Entered By History   methotrexate 2.5 MG tablet 2.5 mg once a week 6 tablets once per week 2/20/20  Yes Reported, Patient   Pseudoephedrine-APAP  MG TABS Take 300 mg by mouth daily   Yes Reported, Patient   rifaximin (XIFAXAN) 550 MG TABS tablet Take 1 tablet (550 mg) by mouth 2 times daily 8/2/21  Yes Juancho Willard MD   tiZANidine (ZANAFLEX) 4 MG tablet Take 2-8 mg by mouth 6/23/20  Yes Reported, Patient   zinc sulfate (ZINCATE) 220 MG capsule  "Take 50 mg by mouth daily   Yes Reported, Patient   aspirin (ASA) 325 MG tablet Take 650 mg by mouth  Patient not taking: Reported on 9/16/2021 8/10/18   Reported, Patient   furosemide (LASIX) 40 MG tablet Take 2 tablets (80 mg) by mouth daily  Patient not taking: Reported on 3/19/2019 9/6/16   Juancho Willard MD   lactulose (CHRONULAC) 10 GM/15ML solution Take 30 mLs (20 g) by mouth 3 times daily Ensure you have 3 loose BMs/day.  Patient not taking: Reported on 3/20/2018 11/3/16   Juancho Willard MD   Rivaroxaban (XARELTO PO) Take 10 mg by mouth daily  Patient not taking: Reported on 9/16/2021    Reported, Patient       Previous Transplant Hx: No    Cardiovascular Hx:       h/o Cardiac Issues: No       Exercise Tolerance: no chest pain or shortness of breath with exertion.    Potential Donor(s): No    ROS:    REVIEW OF SYSTEMS (check box if normal)  [x]                GENERAL  [x]                  PULMONARY [x]                 GENITOURINARY  [x]                 CNS                 [x]                  CARDIAC  [x]                  ENDOCRINE  [x]                 EARS,NOSE,THROAT [x]                  GASTROINTESTINAL [x]                  NEUROLOGIC    [x]                 MUSCLOSKELTAL  [x]                   HEMATOLOGY    Examination:     Vitals:  BP (!) 155/70   Pulse 74   Ht 1.778 m (5' 10\")   Wt 89.4 kg (197 lb)   SpO2 96%   BMI 28.27 kg/m      GENERAL APPEARANCE: alert and no distress  EYES: PERRL  HENT: mouth without ulcers or lesions  NECK: supple, no adenopathy  RESP: lungs clear to auscultation - no rales, rhonchi or wheezes  CV: regular rhythm, normal rate, no rub   ABDOMEN:  soft, nontender, no HSM or masses and bowel sounds normal; thank you very appreciated Mehdi taylor  MS: extremities normal- no gross deformities noted, no evidence of inflammation in joints, no muscle tenderness  SKIN: no rash  NEURO: Normal strength and tone, sensory exam grossly normal, mentation intact and speech normal  PSYCH: " mentation appears normal. and affect normal/bright      Results:   Recent Results (from the past 168 hour(s))   Lipid Profile    Collection Time: 11/30/21  7:52 AM   Result Value Ref Range    Cholesterol 182 <200 mg/dL    Triglycerides 99 <150 mg/dL    Direct Measure HDL 72 >=40 mg/dL    LDL Cholesterol Calculated 90 <=100 mg/dL    Non HDL Cholesterol 110 <130 mg/dL    Patient Fasting > 8hrs? Yes    Basic metabolic panel    Collection Time: 11/30/21  7:52 AM   Result Value Ref Range    Sodium 143 133 - 144 mmol/L    Potassium 4.4 3.4 - 5.3 mmol/L    Chloride 110 (H) 94 - 109 mmol/L    Carbon Dioxide (CO2) 26 20 - 32 mmol/L    Anion Gap 7 3 - 14 mmol/L    Urea Nitrogen 10 7 - 30 mg/dL    Creatinine 0.74 0.66 - 1.25 mg/dL    Calcium 9.1 8.5 - 10.1 mg/dL    Glucose 133 (H) 70 - 99 mg/dL    GFR Estimate >90 >60 mL/min/1.73m2   Hepatic panel    Collection Time: 11/30/21  7:52 AM   Result Value Ref Range    Bilirubin Total 1.8 (H) 0.2 - 1.3 mg/dL    Bilirubin Direct 0.5 (H) 0.0 - 0.2 mg/dL    Protein Total 6.2 (L) 6.8 - 8.8 g/dL    Albumin 3.2 (L) 3.4 - 5.0 g/dL    Alkaline Phosphatase 230 (H) 40 - 150 U/L    AST 67 (H) 0 - 45 U/L    ALT 41 0 - 70 U/L   Ferritin    Collection Time: 11/30/21  7:52 AM   Result Value Ref Range    Ferritin 37 26 - 388 ng/mL   Hemoglobin A1c    Collection Time: 11/30/21  7:52 AM   Result Value Ref Range    Hemoglobin A1C 6.4 (H) 0.0 - 5.6 %   Iron and iron binding capacity    Collection Time: 11/30/21  7:52 AM   Result Value Ref Range    Iron 116 35 - 180 ug/dL    Iron Binding Capacity 336 240 - 430 ug/dL    Iron Sat Index 35 15 - 46 %   Phosphorus    Collection Time: 11/30/21  7:52 AM   Result Value Ref Range    Phosphorus 3.4 2.5 - 4.5 mg/dL   Prostate spec antigen screen    Collection Time: 11/30/21  7:52 AM   Result Value Ref Range    Prostate Specific Antigen Screen 1.93 0.00 - 4.00 ug/L   TSH with free T4 reflex    Collection Time: 11/30/21  7:52 AM   Result Value Ref Range    TSH 1.49  0.40 - 4.00 mU/L   Transferrin    Collection Time: 11/30/21  7:52 AM   Result Value Ref Range    Transferrin 268 210 - 360 mg/dL   INR    Collection Time: 11/30/21  7:52 AM   Result Value Ref Range    INR 1.25 (H) 0.85 - 1.15   CBC with platelets    Collection Time: 11/30/21  7:52 AM   Result Value Ref Range    WBC Count 18.6 (H) 4.0 - 11.0 10e3/uL    RBC Count 4.89 4.40 - 5.90 10e6/uL    Hemoglobin 15.5 13.3 - 17.7 g/dL    Hematocrit 45.8 40.0 - 53.0 %    MCV 94 78 - 100 fL    MCH 31.7 26.5 - 33.0 pg    MCHC 33.8 31.5 - 36.5 g/dL    RDW 14.3 10.0 - 15.0 %    Platelet Count 99 (L) 150 - 450 10e3/uL   UA reflex to Microscopic and Culture    Collection Time: 11/30/21  7:52 AM    Specimen: Urine, Midstream   Result Value Ref Range    Color Urine Yellow Colorless, Straw, Light Yellow, Yellow    Appearance Urine Clear Clear    Glucose Urine Negative Negative mg/dL    Bilirubin Urine Negative Negative    Ketones Urine Negative Negative mg/dL    Specific Gravity Urine 1.021 1.003 - 1.035    Blood Urine Negative Negative    pH Urine 5.0 5.0 - 7.0    Protein Albumin Urine Negative Negative mg/dL    Urobilinogen Urine Normal Normal, 2.0 mg/dL    Nitrite Urine Negative Negative    Leukocyte Esterase Urine Negative Negative    Mucus Urine Present (A) None Seen /LPF    Calcium Oxalate Crystals Urine Few (A) None Seen /HPF    RBC Urine 1 <=2 /HPF    WBC Urine 1 <=5 /HPF   Protein  random urine with Creat Ratio    Collection Time: 11/30/21  7:52 AM   Result Value Ref Range    Total Protein Random Urine g/L <0.05 g/L    Total Protein Urine g/gr Creatinine      Creatinine Urine mg/dL 204 mg/dL   ABO and Rh    Collection Time: 11/30/21  7:55 AM   Result Value Ref Range    ABO/RH(D) O POS     SPECIMEN EXPIRATION DATE [id number]    To be careful because  I had a long discussion with the patient regarding liver transplantation which included but was not limited to  the following points:    1. Liver transplant selection committee  process.  2. The federal rules for cadaveric waiting list, the size and blood type matching of the organ. The availability of living-related donor transplantation.  3. The types of donors: brain death donors, non-heart beating donors, partial liver grafts: splits and living donor grafts  4. Extended criteria  Donors (older age, steasosis) and the increased  risk of primary non-function using the extended criteria donors  5. [facility] high risk donors,  Risk of donor transmitted infections and donor transmitted malignancy  6. The liver transplant operation and the associated risks and technical complications which can include intraoperative death, post operative death,  Primary non-function, bleeding requiring re-operations, arterial and biliary complications, bowel perforations, and intra abdominal abscess. Some of these complicaitons may require a second operation.  7. The postoperative course, the ICU stay and risk of postoperative complications which can include sepsis, MI, stroke, brain injury, pneumonia, pleural effusions, and renal dysfunction.  8. The current 1 year and 5 year graft and patient survivals.  9. The need for life long immunosuppressive therapy and the side effects of these medications, including the possibility of toxicity, opportunistic infections, risk of cancer including lymphoma, and the possibility of rejection even if the patient is taking the medication exactly as prescribed.  10. The need for compliance with medications and follow-up visits in the clinic and thereafter.  11. The patient and family understand these risks and wish to proceed to transplantation     Review of prior external note(s) from - Outside records from [facility] local notes  60 minutes spent on the date of the encounter doing chart review, history and exam, documentation and further activities per the note

## 2021-12-01 ENCOUNTER — DOCUMENTATION ONLY (OUTPATIENT)
Dept: OTHER | Facility: CLINIC | Age: 66
End: 2021-12-01
Payer: COMMERCIAL

## 2021-12-01 ENCOUNTER — TELEPHONE (OUTPATIENT)
Dept: TRANSPLANT | Facility: CLINIC | Age: 66
End: 2021-12-01
Payer: COMMERCIAL

## 2021-12-01 LAB
GAMMA INTERFERON BACKGROUND BLD IA-ACNC: 0.06 IU/ML
HCV AB SERPL QL IA: REACTIVE
M TB IFN-G BLD-IMP: NEGATIVE
M TB IFN-G CD4+ BCKGRND COR BLD-ACNC: 9.94 IU/ML
MITOGEN IGNF BCKGRD COR BLD-ACNC: 0 IU/ML
MITOGEN IGNF BCKGRD COR BLD-ACNC: 0.04 IU/ML
QUANTIFERON MITOGEN: 10 IU/ML
QUANTIFERON NIL TUBE: 0.06 IU/ML
QUANTIFERON TB1 TUBE: 0.1 IU/ML
QUANTIFERON TB2 TUBE: 0.06

## 2021-12-01 NOTE — TELEPHONE ENCOUNTER
"Transplant Social Work Services Phone Call      Data: Mehdi is being evaluated for a liver transplant.  Intervention: I called Mehdi' wife Tameka to provide post transplant care giving education and to affirm her willingness and ability to provide post transplant care giving for Mehdi.  Assessment: Tameka reports she did not come to patient's appointments yesterday because she thought he could not bring a visitor with him.  I stressed the importance of her attendance at appointments, especially given her concerns about his short-term memory.  Tameka affirms her ability and willingness to provide post transplant care giving for Mehdi.  She does reports he is \"in a little shock\" about the recommendation for a liver transplant.  Tameka asked many appropriate questions about the plan of care and what to expect.    Education provided by SW: virtual liver transplant support group, [employer]  Plan: I have routed this message to pre-transplant coordinators and requested they follow up with Tameka to provide additional education.  I will remain involved for the psychosocial needs of this patient and his wife.      EMILY Braga, [facility]  Liver Transplant   Phone [phone]  Pager [phone]   "

## 2021-12-02 LAB — PETH BLD-MCNC: NEGATIVE NG/ML

## 2021-12-06 RX ORDER — SODIUM CHLORIDE 9 MG/ML
INJECTION, SOLUTION INTRAVENOUS CONTINUOUS
Status: CANCELLED | OUTPATIENT
Start: 2021-12-06

## 2021-12-06 RX ORDER — HEPARIN SODIUM 200 [USP'U]/100ML
1 INJECTION, SOLUTION INTRAVENOUS CONTINUOUS PRN
Status: CANCELLED | OUTPATIENT
Start: 2021-12-06

## 2021-12-06 RX ORDER — NICOTINE POLACRILEX 4 MG
15-30 LOZENGE BUCCAL
Status: CANCELLED | OUTPATIENT
Start: 2021-12-06

## 2021-12-06 RX ORDER — DEXTROSE MONOHYDRATE 25 G/50ML
25-50 INJECTION, SOLUTION INTRAVENOUS
Status: CANCELLED | OUTPATIENT
Start: 2021-12-06

## 2021-12-06 RX ORDER — LIDOCAINE 40 MG/G
CREAM TOPICAL
Status: CANCELLED | OUTPATIENT
Start: 2021-12-06

## 2021-12-07 ENCOUNTER — TELEPHONE (OUTPATIENT)
Dept: CARDIOLOGY | Facility: CLINIC | Age: 66
End: 2021-12-07
Payer: COMMERCIAL

## 2021-12-07 DIAGNOSIS — Z01.810 ENCOUNTER FOR PREPROCEDURAL CARDIOVASCULAR EXAMINATION: Primary | ICD-10-CM

## 2021-12-07 RX ORDER — SODIUM CHLORIDE 9 MG/ML
INJECTION, SOLUTION INTRAVENOUS CONTINUOUS
Status: CANCELLED | OUTPATIENT
Start: 2021-12-07

## 2021-12-07 RX ORDER — POTASSIUM CHLORIDE 1500 MG/1
20 TABLET, EXTENDED RELEASE ORAL
Status: CANCELLED | OUTPATIENT
Start: 2021-12-07

## 2021-12-07 RX ORDER — ASPIRIN 81 MG/1
243 TABLET, CHEWABLE ORAL ONCE
Status: CANCELLED | OUTPATIENT
Start: 2021-12-07

## 2021-12-07 RX ORDER — ASPIRIN 325 MG
325 TABLET ORAL ONCE
Status: CANCELLED | OUTPATIENT
Start: 2021-12-07 | End: 2021-12-07

## 2021-12-07 RX ORDER — LIDOCAINE 40 MG/G
CREAM TOPICAL
Status: CANCELLED | OUTPATIENT
Start: 2021-12-07

## 2021-12-07 RX ORDER — POTASSIUM CHLORIDE 1500 MG/1
40 TABLET, EXTENDED RELEASE ORAL
Status: CANCELLED | OUTPATIENT
Start: 2021-12-07

## 2021-12-10 ENCOUNTER — LAB (OUTPATIENT)
Dept: LAB | Facility: CLINIC | Age: 66
End: 2021-12-10
Attending: RADIOLOGY
Payer: COMMERCIAL

## 2021-12-10 DIAGNOSIS — Z11.59 ENCOUNTER FOR SCREENING FOR OTHER VIRAL DISEASES: ICD-10-CM

## 2021-12-10 PROCEDURE — U0003 INFECTIOUS AGENT DETECTION BY NUCLEIC ACID (DNA OR RNA); SEVERE ACUTE RESPIRATORY SYNDROME CORONAVIRUS 2 (SARS-COV-2) (CORONAVIRUS DISEASE [COVID-19]), AMPLIFIED PROBE TECHNIQUE, MAKING USE OF HIGH THROUGHPUT TECHNOLOGIES AS DESCRIBED BY CMS-2020-01-R: HCPCS

## 2021-12-10 PROCEDURE — U0005 INFEC AGEN DETEC AMPLI PROBE: HCPCS

## 2021-12-11 LAB — SARS-COV-2 RNA RESP QL NAA+PROBE: NEGATIVE

## 2021-12-13 ENCOUNTER — HOSPITAL ENCOUNTER (OUTPATIENT)
Dept: NUCLEAR MEDICINE | Facility: CLINIC | Age: 66
Setting detail: OBSERVATION
End: 2021-12-13
Attending: RADIOLOGY | Admitting: RADIOLOGY
Payer: COMMERCIAL

## 2021-12-13 ENCOUNTER — APPOINTMENT (OUTPATIENT)
Dept: INTERVENTIONAL RADIOLOGY/VASCULAR | Facility: CLINIC | Age: 66
End: 2021-12-13
Attending: RADIOLOGY
Payer: COMMERCIAL

## 2021-12-13 ENCOUNTER — HOSPITAL ENCOUNTER (OUTPATIENT)
Facility: CLINIC | Age: 66
Discharge: HOME | End: 2021-12-13
Attending: RADIOLOGY | Admitting: RADIOLOGY
Payer: COMMERCIAL

## 2021-12-13 ENCOUNTER — APPOINTMENT (OUTPATIENT)
Dept: MEDSURG UNIT | Facility: CLINIC | Age: 66
End: 2021-12-13
Attending: RADIOLOGY
Payer: COMMERCIAL

## 2021-12-13 VITALS
SYSTOLIC BLOOD PRESSURE: 143 MMHG | WEIGHT: 193 LBS | OXYGEN SATURATION: 96 % | TEMPERATURE: 97.9 F | RESPIRATION RATE: 18 BRPM | HEART RATE: 67 BPM | DIASTOLIC BLOOD PRESSURE: 74 MMHG | BODY MASS INDEX: 27.02 KG/M2 | HEIGHT: 71 IN

## 2021-12-13 DIAGNOSIS — K70.31 ALCOHOLIC CIRRHOSIS OF LIVER WITH ASCITES (H): ICD-10-CM

## 2021-12-13 DIAGNOSIS — C22.0 HCC (HEPATOCELLULAR CARCINOMA) (H): ICD-10-CM

## 2021-12-13 LAB
ALBUMIN SERPL-MCNC: 3 G/DL (ref 3.4–5)
ALP SERPL-CCNC: 233 U/L (ref 40–150)
ALT SERPL W P-5'-P-CCNC: 37 U/L (ref 0–70)
ANION GAP SERPL CALCULATED.3IONS-SCNC: 9 MMOL/L (ref 3–14)
APTT PPP: 32 SECONDS (ref 22–38)
AST SERPL W P-5'-P-CCNC: 58 U/L (ref 0–45)
BILIRUB DIRECT SERPL-MCNC: 0.4 MG/DL (ref 0–0.2)
BILIRUB SERPL-MCNC: 1.7 MG/DL (ref 0.2–1.3)
BUN SERPL-MCNC: 8 MG/DL (ref 7–30)
CALCIUM SERPL-MCNC: 9.1 MG/DL (ref 8.5–10.1)
CHLORIDE BLD-SCNC: 113 MMOL/L (ref 94–109)
CO2 SERPL-SCNC: 23 MMOL/L (ref 20–32)
CREAT SERPL-MCNC: 0.61 MG/DL (ref 0.66–1.25)
ERYTHROCYTE [DISTWIDTH] IN BLOOD BY AUTOMATED COUNT: 14.3 % (ref 10–15)
GFR SERPL CREATININE-BSD FRML MDRD: >90 ML/MIN/1.73M2
GLUCOSE BLD-MCNC: 140 MG/DL (ref 70–99)
HCT VFR BLD AUTO: 43.6 % (ref 40–53)
HGB BLD-MCNC: 14.7 G/DL (ref 13.3–17.7)
INR PPP: 1.26 (ref 0.85–1.15)
MCH RBC QN AUTO: 31.5 PG (ref 26.5–33)
MCHC RBC AUTO-ENTMCNC: 33.7 G/DL (ref 31.5–36.5)
MCV RBC AUTO: 94 FL (ref 78–100)
PLATELET # BLD AUTO: 95 10E3/UL (ref 150–450)
POTASSIUM BLD-SCNC: 4 MMOL/L (ref 3.4–5.3)
PROT SERPL-MCNC: 6 G/DL (ref 6.8–8.8)
RBC # BLD AUTO: 4.66 10E6/UL (ref 4.4–5.9)
SODIUM SERPL-SCNC: 145 MMOL/L (ref 133–144)
WBC # BLD AUTO: 18.7 10E3/UL (ref 4–11)

## 2021-12-13 PROCEDURE — 85610 PROTHROMBIN TIME: CPT | Performed by: NURSE PRACTITIONER

## 2021-12-13 PROCEDURE — A9540 TC99M MAA: HCPCS | Performed by: RADIOLOGY

## 2021-12-13 PROCEDURE — 78830 RP LOCLZJ TUM SPECT W/CT 1: CPT | Mod: 26 | Performed by: RADIOLOGY

## 2021-12-13 PROCEDURE — 77300 RADIATION THERAPY DOSE PLAN: CPT | Mod: 26 | Performed by: RADIOLOGY

## 2021-12-13 PROCEDURE — 272N000504 HC NEEDLE CR4

## 2021-12-13 PROCEDURE — 255N000002 HC RX 255 OP 636: Performed by: RADIOLOGY

## 2021-12-13 PROCEDURE — 250N000009 HC RX 250: Performed by: STUDENT IN AN ORGANIZED HEALTH CARE EDUCATION/TRAINING PROGRAM

## 2021-12-13 PROCEDURE — 75774 ARTERY X-RAY EACH VESSEL: CPT | Mod: 26 | Performed by: RADIOLOGY

## 2021-12-13 PROCEDURE — 272N000280 HC DEVICE COMPRESSION CR5

## 2021-12-13 PROCEDURE — 75774 ARTERY X-RAY EACH VESSEL: CPT

## 2021-12-13 PROCEDURE — 272N000119 HC CATH CR4

## 2021-12-13 PROCEDURE — 250N000011 HC RX IP 250 OP 636: Performed by: NURSE PRACTITIONER

## 2021-12-13 PROCEDURE — 78830 RP LOCLZJ TUM SPECT W/CT 1: CPT

## 2021-12-13 PROCEDURE — 75726 ARTERY X-RAYS ABDOMEN: CPT

## 2021-12-13 PROCEDURE — 77261 THER RADIOLOGY TX PLNG SMPL: CPT | Performed by: RADIOLOGY

## 2021-12-13 PROCEDURE — 75726 ARTERY X-RAYS ABDOMEN: CPT | Mod: 26 | Performed by: RADIOLOGY

## 2021-12-13 PROCEDURE — 82248 BILIRUBIN DIRECT: CPT | Performed by: NURSE PRACTITIONER

## 2021-12-13 PROCEDURE — C1769 GUIDE WIRE: HCPCS

## 2021-12-13 PROCEDURE — 36415 COLL VENOUS BLD VENIPUNCTURE: CPT | Performed by: NURSE PRACTITIONER

## 2021-12-13 PROCEDURE — C1725 CATH, TRANSLUMIN NON-LASER: HCPCS

## 2021-12-13 PROCEDURE — 272N000143 HC KIT CR3

## 2021-12-13 PROCEDURE — C1887 CATHETER, GUIDING: HCPCS

## 2021-12-13 PROCEDURE — 258N000003 HC RX IP 258 OP 636: Performed by: NURSE PRACTITIONER

## 2021-12-13 PROCEDURE — 272N000570 HC SHEATH CR7

## 2021-12-13 PROCEDURE — 250N000011 HC RX IP 250 OP 636: Performed by: STUDENT IN AN ORGANIZED HEALTH CARE EDUCATION/TRAINING PROGRAM

## 2021-12-13 PROCEDURE — 36247 INS CATH ABD/L-EXT ART 3RD: CPT | Mod: GC | Performed by: RADIOLOGY

## 2021-12-13 PROCEDURE — 79445 NUCLEAR RX INTRA-ARTERIAL: CPT

## 2021-12-13 PROCEDURE — 999N000142 HC STATISTIC PROCEDURE PREP ONLY

## 2021-12-13 PROCEDURE — 36248 INS CATH ABD/L-EXT ART ADDL: CPT

## 2021-12-13 PROCEDURE — 343N000001 HC RX 343: Performed by: RADIOLOGY

## 2021-12-13 PROCEDURE — 999N000134 HC STATISTIC PP CARE STAGE 3

## 2021-12-13 PROCEDURE — 85730 THROMBOPLASTIN TIME PARTIAL: CPT | Performed by: NURSE PRACTITIONER

## 2021-12-13 PROCEDURE — 85027 COMPLETE CBC AUTOMATED: CPT | Performed by: NURSE PRACTITIONER

## 2021-12-13 PROCEDURE — 36247 INS CATH ABD/L-EXT ART 3RD: CPT

## 2021-12-13 PROCEDURE — 80053 COMPREHEN METABOLIC PANEL: CPT | Performed by: NURSE PRACTITIONER

## 2021-12-13 RX ORDER — NALOXONE HYDROCHLORIDE 0.4 MG/ML
0.2 INJECTION, SOLUTION INTRAMUSCULAR; INTRAVENOUS; SUBCUTANEOUS
Status: DISCONTINUED | OUTPATIENT
Start: 2021-12-13 | End: 2021-12-13 | Stop reason: HOSPADM

## 2021-12-13 RX ORDER — NALOXONE HYDROCHLORIDE 0.4 MG/ML
0.4 INJECTION, SOLUTION INTRAMUSCULAR; INTRAVENOUS; SUBCUTANEOUS
Status: DISCONTINUED | OUTPATIENT
Start: 2021-12-13 | End: 2021-12-13 | Stop reason: HOSPADM

## 2021-12-13 RX ORDER — LIDOCAINE 40 MG/G
CREAM TOPICAL
Status: DISCONTINUED | OUTPATIENT
Start: 2021-12-13 | End: 2021-12-13 | Stop reason: HOSPADM

## 2021-12-13 RX ORDER — NICOTINE POLACRILEX 4 MG
15-30 LOZENGE BUCCAL
Status: DISCONTINUED | OUTPATIENT
Start: 2021-12-13 | End: 2021-12-13 | Stop reason: HOSPADM

## 2021-12-13 RX ORDER — HEPARIN SODIUM 200 [USP'U]/100ML
1 INJECTION, SOLUTION INTRAVENOUS CONTINUOUS PRN
Status: DISCONTINUED | OUTPATIENT
Start: 2021-12-13 | End: 2021-12-13 | Stop reason: HOSPADM

## 2021-12-13 RX ORDER — IODIXANOL 320 MG/ML
150 INJECTION, SOLUTION INTRAVASCULAR ONCE
Status: COMPLETED | OUTPATIENT
Start: 2021-12-13 | End: 2021-12-13

## 2021-12-13 RX ORDER — DEXTROSE MONOHYDRATE 25 G/50ML
25-50 INJECTION, SOLUTION INTRAVENOUS
Status: DISCONTINUED | OUTPATIENT
Start: 2021-12-13 | End: 2021-12-13 | Stop reason: HOSPADM

## 2021-12-13 RX ORDER — FENTANYL CITRATE 50 UG/ML
25-50 INJECTION, SOLUTION INTRAMUSCULAR; INTRAVENOUS EVERY 5 MIN PRN
Status: DISCONTINUED | OUTPATIENT
Start: 2021-12-13 | End: 2021-12-13 | Stop reason: HOSPADM

## 2021-12-13 RX ORDER — FLUMAZENIL 0.1 MG/ML
0.2 INJECTION, SOLUTION INTRAVENOUS
Status: DISCONTINUED | OUTPATIENT
Start: 2021-12-13 | End: 2021-12-13 | Stop reason: HOSPADM

## 2021-12-13 RX ORDER — SODIUM CHLORIDE 9 MG/ML
INJECTION, SOLUTION INTRAVENOUS CONTINUOUS
Status: DISCONTINUED | OUTPATIENT
Start: 2021-12-13 | End: 2021-12-13 | Stop reason: HOSPADM

## 2021-12-13 RX ADMIN — IODIXANOL 100 ML: 320 INJECTION, SOLUTION INTRAVASCULAR at 10:58

## 2021-12-13 RX ADMIN — LIDOCAINE HYDROCHLORIDE 2 ML: 10 INJECTION, SOLUTION EPIDURAL; INFILTRATION; INTRACAUDAL; PERINEURAL at 09:25

## 2021-12-13 RX ADMIN — SODIUM CHLORIDE: 9 INJECTION, SOLUTION INTRAVENOUS at 07:48

## 2021-12-13 RX ADMIN — FENTANYL CITRATE 25 MCG: 50 INJECTION INTRAMUSCULAR; INTRAVENOUS at 10:13

## 2021-12-13 RX ADMIN — MIDAZOLAM 0.5 MG: 1 INJECTION INTRAMUSCULAR; INTRAVENOUS at 10:13

## 2021-12-13 RX ADMIN — FENTANYL CITRATE 50 MCG: 50 INJECTION INTRAMUSCULAR; INTRAVENOUS at 09:25

## 2021-12-13 RX ADMIN — MIDAZOLAM 1 MG: 1 INJECTION INTRAMUSCULAR; INTRAVENOUS at 09:25

## 2021-12-13 RX ADMIN — KIT FOR THE PREPARATION OF TECHNETIUM TC 99M ALBUMIN AGGREGATED 4.6 MILLICURIE: 2.5 INJECTION, POWDER, FOR SOLUTION INTRAVENOUS at 12:05

## 2021-12-13 RX ADMIN — FENTANYL CITRATE 25 MCG: 50 INJECTION INTRAMUSCULAR; INTRAVENOUS at 09:20

## 2021-12-13 RX ADMIN — HEPARIN SODIUM 2 BAG: 200 INJECTION, SOLUTION INTRAVENOUS at 09:34

## 2021-12-13 RX ADMIN — MIDAZOLAM 0.5 MG: 1 INJECTION INTRAMUSCULAR; INTRAVENOUS at 09:20

## 2021-12-13 ASSESSMENT — MIFFLIN-ST. JEOR: SCORE: 1674.63

## 2021-12-13 NOTE — PROGRESS NOTES
Patient tolerated recovery stage well. VSS, left wrist TR band site clean/dry/intact,slightly swollen at site and slightly  painful. Patient tolerated PO food and fluids. Teaching was done and discharge instructions were given. Patient ambulated, voided, and PIV was removed. Patient discharged from the hospital via wheel chair to home with wife Tameka.

## 2021-12-13 NOTE — IR NOTE
Patient Name: Mehdi Martinez  Medical Record Number: [id number]  Today's Date: 12/13/2021    Procedure: visceral angiogram and radioembolization mapping  Proceduralist: Dr AFSHIN Lai, Dr ADONIS Guaman    Sedation start time: 0920  Sedation end time: 1050  Sedation medications administered: 2 mg versed, 100 mcg fentanyl  Total sedation time: 90 min  Sedation Notes: none    Procedure start time: 0919  Procedure end time: 1050    Report given to: Josephine DELGADO   : none    Other Notes: Pt arrived to IR room 1 from . Consent reviewed, pt confirmed. Pt denies any questions or concerns regarding procedure. Pt positioned supine and monitored per protocol. Left radial artery accessed. TR band applied at 1048 with 14 ml air. Site cleansed and dressed per protocol. Pt tolerated procedure without any noted complications. Pt transferred back to .

## 2021-12-13 NOTE — PROGRESS NOTES
Patient arrived on 2A for Y90 mapping.  IV placed, labs sent.  Groin prepped, pulses marked, +4 RLE, +3 LLE.  Consent done.  Awaiting chemistry labs and hepatic panel, otherwise prep complete.

## 2021-12-13 NOTE — PRE-PROCEDURE
GENERAL PRE-PROCEDURE:   Procedure:  Y90 mappiong  Date/Time:  12/13/2021 8:22 AM    Verbal consent obtained?: Yes    Written consent obtained?: Yes    Risks and benefits: Risks, benefits and alternatives were discussed    Consent given by:  Patient  Patient states understanding of procedure being performed: Yes    Patient's understanding of procedure matches consent: Yes    Procedure consent matches procedure scheduled: Yes    Expected level of sedation:  Moderate  Appropriately NPO:  Yes  ASA Class:  2  Mallampati  :  Grade 1- soft palate, uvula, tonsillar pillars, and posterior pharyngeal wall visible  Lungs:  Lungs clear with good breath sounds bilaterally  Heart:  Normal heart sounds and rate  History & Physical reviewed:  History and physical reviewed and no updates needed  Statement of review:  I have reviewed the lab findings, diagnostic data, medications, and the plan for sedation

## 2021-12-13 NOTE — DISCHARGE INSTRUCTIONS
Going Home after a Thereasphere Mapping or Delivery      For 24 hours:    Have an adult stay with you for 24 hours.    Drink plenty of fluids.    You may eat your normal diet, unless your doctor tells you otherwise.:  - Relax and take it easy.  - Do NOT smoke.  - Do NOT make any important or legal decisions.  - Do NOT drive or operate machines at home or at work.  - Do NOT drink alcohol    Care of the Wrist or Arm site:    It is normal to have soreness at the puncture site and mild tingling in your hand for up to 3 days. There should not be a lump at the site.    Remove the Band-Aid after 24 hours, If there is minor oozing, apply another Band-Aid and remove it after 12 hours     You may shower but leave the Band-Aid on until after the shower.    Do not Scrub the site vigorously for 3 days .     Do not use lotion or powder near the puncture site for 3 days    For 2 days, do not use you hand or arm to support your weight (such as rising from the chair) or bend you wrist (such as lifting a garage door.    For 2 days, do not lift more than 5 pounds or exercise your arm ( (tennis, golf, or bowling)    No tub bath, hot tubs, or swimming for 3 days.        If you start bleeding from the site in your arm: Sit down and press firmly on the site with your fingers for 10 minutes. Call your doctor as soon as you can.    Andres 911 if you have bleeding that is heavy or does not stop.    Call your doctor if:     You have a large or growing hard lump around the site.    The site is red, swollen, hot or tender.    Blood or fluid is draining from the site.    You have chills or a fever greater that 101'F (38'C)    Your arm turns bluish, feels numb or cool.    You have hives, a rash or unusual itching        Additional Information: Restart metformin 12/16    Procedural Physician:Dr Guaman/  Dr Lai     Date@date 12/13/2021    [phone]: Ask for the Interventional Radiologist on call. Someone is available 24 hours a day

## 2021-12-13 NOTE — PROGRESS NOTES
Pt returned from IR via liter accompanied by nurse at 1100.Left radial TR band site not bleeding with 14cc in balloon.Pt complains of pain I informed him that it would get better after balloon deflated.Pt tolerating food and fluids.

## 2021-12-13 NOTE — PROCEDURES
[facility]    Procedure: IR Procedure Note    Date/Time: 12/13/2021 11:20 AM  Performed by: Rohan Lai MD  Authorized by: Rohan Lai MD   IR Fellow Physician:  Radiology Resident Physician: Y90 mapping        UNIVERSAL PROTOCOL   Site Marked: NA  Prior Images Obtained and Reviewed:  Yes  Required items: Required blood products, implants, devices and special equipment available    Patient identity confirmed:  Verbally with patient, arm band, provided demographic data and hospital-assigned identification number  Patient was reevaluated immediately before administering moderate or deep sedation or anesthesia  Confirmation Checklist:  Patient's identity using two indicators, relevant allergies, procedure was appropriate and matched the consent or emergent situation and correct equipment/implants were available  Time out: Immediately prior to the procedure a time out was called    Universal Protocol: the Joint Commission Universal Protocol was followed    Preparation: Patient was prepped and draped in usual sterile fashion       ANESTHESIA    Anesthesia: Local infiltration  Local Anesthetic:  Lidocaine 1% without epinephrine      SEDATION  Patient Sedated: Yes    Sedation:  Midazolam and fentanyl  Vital signs: Vital signs monitored during sedation    See dictated procedure note for full details.  Findings: Successful Y90 mapping    Specimens: none    Complications: None    Condition: Stable    Plan: Return to patient care unit      PROCEDURE    Patient Tolerance:  Patient tolerated the procedure well with no immediate complications  Length of time physician/provider present for 1:1 monitoring during sedation: 90

## 2021-12-14 ENCOUNTER — HOSPITAL ENCOUNTER (OUTPATIENT)
Facility: CLINIC | Age: 66
Discharge: HOME | End: 2021-12-14
Attending: RADIOLOGY | Admitting: RADIOLOGY
Payer: COMMERCIAL

## 2021-12-14 ENCOUNTER — APPOINTMENT (OUTPATIENT)
Dept: INTERVENTIONAL RADIOLOGY/VASCULAR | Facility: CLINIC | Age: 66
End: 2021-12-14
Attending: RADIOLOGY
Payer: COMMERCIAL

## 2021-12-14 ENCOUNTER — HOSPITAL ENCOUNTER (OUTPATIENT)
Dept: NUCLEAR MEDICINE | Facility: CLINIC | Age: 66
Setting detail: OBSERVATION
End: 2021-12-14
Attending: RADIOLOGY | Admitting: RADIOLOGY
Payer: COMMERCIAL

## 2021-12-14 ENCOUNTER — APPOINTMENT (OUTPATIENT)
Dept: MEDSURG UNIT | Facility: CLINIC | Age: 66
End: 2021-12-14
Attending: RADIOLOGY
Payer: COMMERCIAL

## 2021-12-14 VITALS
RESPIRATION RATE: 16 BRPM | BODY MASS INDEX: 27.02 KG/M2 | TEMPERATURE: 97.5 F | HEIGHT: 71 IN | HEART RATE: 88 BPM | OXYGEN SATURATION: 98 % | DIASTOLIC BLOOD PRESSURE: 78 MMHG | SYSTOLIC BLOOD PRESSURE: 128 MMHG | WEIGHT: 193 LBS

## 2021-12-14 DIAGNOSIS — C22.0 HCC (HEPATOCELLULAR CARCINOMA) (H): Primary | ICD-10-CM

## 2021-12-14 DIAGNOSIS — K70.31 ALCOHOLIC CIRRHOSIS OF LIVER WITH ASCITES (H): ICD-10-CM

## 2021-12-14 LAB
ALBUMIN SERPL-MCNC: 3 G/DL (ref 3.4–5)
ALP SERPL-CCNC: 176 U/L (ref 40–150)
ALT SERPL W P-5'-P-CCNC: 36 U/L (ref 0–70)
AST SERPL W P-5'-P-CCNC: 54 U/L (ref 0–45)
BILIRUB DIRECT SERPL-MCNC: 0.5 MG/DL (ref 0–0.2)
BILIRUB SERPL-MCNC: 1.9 MG/DL (ref 0.2–1.3)
GLUCOSE BLDC GLUCOMTR-MCNC: 138 MG/DL (ref 70–99)
PROT SERPL-MCNC: 5.8 G/DL (ref 6.8–8.8)

## 2021-12-14 PROCEDURE — 258N000003 HC RX IP 258 OP 636: Performed by: NURSE PRACTITIONER

## 2021-12-14 PROCEDURE — 250N000011 HC RX IP 250 OP 636: Performed by: STUDENT IN AN ORGANIZED HEALTH CARE EDUCATION/TRAINING PROGRAM

## 2021-12-14 PROCEDURE — C1769 GUIDE WIRE: HCPCS

## 2021-12-14 PROCEDURE — 272N000280 HC DEVICE COMPRESSION CR5

## 2021-12-14 PROCEDURE — 76937 US GUIDE VASCULAR ACCESS: CPT

## 2021-12-14 PROCEDURE — 99152 MOD SED SAME PHYS/QHP 5/>YRS: CPT

## 2021-12-14 PROCEDURE — 272N000192 HC ACCESSORY CR2

## 2021-12-14 PROCEDURE — C1887 CATHETER, GUIDING: HCPCS

## 2021-12-14 PROCEDURE — 75726 ARTERY X-RAYS ABDOMEN: CPT

## 2021-12-14 PROCEDURE — 272N000570 HC SHEATH CR7

## 2021-12-14 PROCEDURE — 999N000134 HC STATISTIC PP CARE STAGE 3

## 2021-12-14 PROCEDURE — 250N000011 HC RX IP 250 OP 636: Performed by: NURSE PRACTITIONER

## 2021-12-14 PROCEDURE — 78830 RP LOCLZJ TUM SPECT W/CT 1: CPT | Mod: 26

## 2021-12-14 PROCEDURE — 272N000119 HC CATH CR4

## 2021-12-14 PROCEDURE — 79445 NUCLEAR RX INTRA-ARTERIAL: CPT | Mod: 26 | Performed by: RADIOLOGY

## 2021-12-14 PROCEDURE — 36415 COLL VENOUS BLD VENIPUNCTURE: CPT | Performed by: NURSE PRACTITIONER

## 2021-12-14 PROCEDURE — 36246 INS CATH ABD/L-EXT ART 2ND: CPT | Mod: GC | Performed by: RADIOLOGY

## 2021-12-14 PROCEDURE — 76937 US GUIDE VASCULAR ACCESS: CPT | Mod: 26 | Performed by: RADIOLOGY

## 2021-12-14 PROCEDURE — 272N000143 HC KIT CR3

## 2021-12-14 PROCEDURE — C2616 BRACHYTX, NON-STR,YTTRIUM-90: HCPCS | Performed by: RADIOLOGY

## 2021-12-14 PROCEDURE — 278N000001 HC RX 278: Performed by: RADIOLOGY

## 2021-12-14 PROCEDURE — 255N000002 HC RX 255 OP 636: Performed by: RADIOLOGY

## 2021-12-14 PROCEDURE — 37242 VASC EMBOLIZE/OCCLUDE ARTERY: CPT | Mod: GC | Performed by: RADIOLOGY

## 2021-12-14 PROCEDURE — 99153 MOD SED SAME PHYS/QHP EA: CPT

## 2021-12-14 PROCEDURE — 99152 MOD SED SAME PHYS/QHP 5/>YRS: CPT | Mod: GC | Performed by: RADIOLOGY

## 2021-12-14 PROCEDURE — 75774 ARTERY X-RAY EACH VESSEL: CPT

## 2021-12-14 PROCEDURE — 36247 INS CATH ABD/L-EXT ART 3RD: CPT

## 2021-12-14 PROCEDURE — 37243 VASC EMBOLIZE/OCCLUDE ORGAN: CPT

## 2021-12-14 PROCEDURE — 82962 GLUCOSE BLOOD TEST: CPT

## 2021-12-14 PROCEDURE — 80076 HEPATIC FUNCTION PANEL: CPT | Performed by: NURSE PRACTITIONER

## 2021-12-14 PROCEDURE — 250N000009 HC RX 250: Performed by: STUDENT IN AN ORGANIZED HEALTH CARE EDUCATION/TRAINING PROGRAM

## 2021-12-14 PROCEDURE — 79445 NUCLEAR RX INTRA-ARTERIAL: CPT

## 2021-12-14 PROCEDURE — 999N000142 HC STATISTIC PROCEDURE PREP ONLY

## 2021-12-14 PROCEDURE — C9113 INJ PANTOPRAZOLE SODIUM, VIA: HCPCS | Performed by: NURSE PRACTITIONER

## 2021-12-14 RX ORDER — NICOTINE POLACRILEX 4 MG
15-30 LOZENGE BUCCAL
Status: DISCONTINUED | OUTPATIENT
Start: 2021-12-14 | End: 2021-12-14 | Stop reason: HOSPADM

## 2021-12-14 RX ORDER — IODIXANOL 320 MG/ML
150 INJECTION, SOLUTION INTRAVASCULAR ONCE
Status: COMPLETED | OUTPATIENT
Start: 2021-12-14 | End: 2021-12-14

## 2021-12-14 RX ORDER — NALOXONE HYDROCHLORIDE 0.4 MG/ML
0.2 INJECTION, SOLUTION INTRAMUSCULAR; INTRAVENOUS; SUBCUTANEOUS
Status: DISCONTINUED | OUTPATIENT
Start: 2021-12-14 | End: 2021-12-14 | Stop reason: HOSPADM

## 2021-12-14 RX ORDER — ONDANSETRON 2 MG/ML
4 INJECTION INTRAMUSCULAR; INTRAVENOUS ONCE
Status: COMPLETED | OUTPATIENT
Start: 2021-12-14 | End: 2021-12-14

## 2021-12-14 RX ORDER — NALOXONE HYDROCHLORIDE 0.4 MG/ML
0.4 INJECTION, SOLUTION INTRAMUSCULAR; INTRAVENOUS; SUBCUTANEOUS
Status: DISCONTINUED | OUTPATIENT
Start: 2021-12-14 | End: 2021-12-14 | Stop reason: HOSPADM

## 2021-12-14 RX ORDER — SODIUM CHLORIDE 9 MG/ML
INJECTION, SOLUTION INTRAVENOUS CONTINUOUS
Status: DISCONTINUED | OUTPATIENT
Start: 2021-12-14 | End: 2021-12-14 | Stop reason: HOSPADM

## 2021-12-14 RX ORDER — LIDOCAINE 40 MG/G
CREAM TOPICAL
Status: DISCONTINUED | OUTPATIENT
Start: 2021-12-14 | End: 2021-12-14 | Stop reason: HOSPADM

## 2021-12-14 RX ORDER — ONDANSETRON 4 MG/1
4-8 TABLET, FILM COATED ORAL EVERY 6 HOURS PRN
Qty: 40 TABLET | Refills: 0 | Status: SHIPPED | OUTPATIENT
Start: 2021-12-14 | End: 2022-10-06

## 2021-12-14 RX ORDER — FENTANYL CITRATE 50 UG/ML
25-50 INJECTION, SOLUTION INTRAMUSCULAR; INTRAVENOUS EVERY 5 MIN PRN
Status: DISCONTINUED | OUTPATIENT
Start: 2021-12-14 | End: 2021-12-14 | Stop reason: HOSPADM

## 2021-12-14 RX ORDER — FLUMAZENIL 0.1 MG/ML
0.2 INJECTION, SOLUTION INTRAVENOUS
Status: DISCONTINUED | OUTPATIENT
Start: 2021-12-14 | End: 2021-12-14 | Stop reason: HOSPADM

## 2021-12-14 RX ORDER — HYDROMORPHONE HYDROCHLORIDE 2 MG/1
2-4 TABLET ORAL EVERY 4 HOURS PRN
Status: DISCONTINUED | OUTPATIENT
Start: 2021-12-14 | End: 2021-12-14 | Stop reason: HOSPADM

## 2021-12-14 RX ORDER — OXYCODONE HYDROCHLORIDE 5 MG/1
5-10 TABLET ORAL EVERY 6 HOURS PRN
Qty: 10 TABLET | Refills: 0 | Status: ON HOLD | OUTPATIENT
Start: 2021-12-14 | End: 2022-01-21

## 2021-12-14 RX ORDER — PANTOPRAZOLE SODIUM 40 MG/1
40 TABLET, DELAYED RELEASE ORAL DAILY
Qty: 30 TABLET | Refills: 0 | Status: SHIPPED | OUTPATIENT
Start: 2021-12-14 | End: 2022-01-12

## 2021-12-14 RX ORDER — METHYLPREDNISOLONE 4 MG
TABLET, DOSE PACK ORAL
Qty: 21 TABLET | Refills: 0 | Status: SHIPPED | OUTPATIENT
Start: 2021-12-14 | End: 2022-09-07

## 2021-12-14 RX ORDER — HEPARIN SODIUM 200 [USP'U]/100ML
1 INJECTION, SOLUTION INTRAVENOUS CONTINUOUS PRN
Status: DISCONTINUED | OUTPATIENT
Start: 2021-12-14 | End: 2021-12-14 | Stop reason: HOSPADM

## 2021-12-14 RX ORDER — AMPICILLIN AND SULBACTAM 2; 1 G/1; G/1
3 INJECTION, POWDER, FOR SOLUTION INTRAMUSCULAR; INTRAVENOUS
Status: COMPLETED | OUTPATIENT
Start: 2021-12-14 | End: 2021-12-14

## 2021-12-14 RX ORDER — DEXTROSE MONOHYDRATE 25 G/50ML
25-50 INJECTION, SOLUTION INTRAVENOUS
Status: DISCONTINUED | OUTPATIENT
Start: 2021-12-14 | End: 2021-12-14 | Stop reason: HOSPADM

## 2021-12-14 RX ADMIN — FENTANYL CITRATE 50 MCG: 50 INJECTION INTRAMUSCULAR; INTRAVENOUS at 09:35

## 2021-12-14 RX ADMIN — ONDANSETRON 4 MG: 2 INJECTION INTRAMUSCULAR; INTRAVENOUS at 09:42

## 2021-12-14 RX ADMIN — FENTANYL CITRATE 25 MCG: 50 INJECTION INTRAMUSCULAR; INTRAVENOUS at 10:05

## 2021-12-14 RX ADMIN — HYDROCORTISONE SODIUM SUCCINATE 100 MG: 100 INJECTION, POWDER, FOR SOLUTION INTRAMUSCULAR; INTRAVENOUS at 07:20

## 2021-12-14 RX ADMIN — SODIUM CHLORIDE: 9 INJECTION, SOLUTION INTRAVENOUS at 07:15

## 2021-12-14 RX ADMIN — HEPARIN SODIUM 2 BAG: 200 INJECTION, SOLUTION INTRAVENOUS at 09:43

## 2021-12-14 RX ADMIN — AMPICILLIN SODIUM AND SULBACTAM SODIUM 3 G: 2; 1 INJECTION, POWDER, FOR SOLUTION INTRAMUSCULAR; INTRAVENOUS at 07:18

## 2021-12-14 RX ADMIN — Medication 23.5 MCI.: at 10:00

## 2021-12-14 RX ADMIN — FENTANYL CITRATE 25 MCG: 50 INJECTION INTRAMUSCULAR; INTRAVENOUS at 09:58

## 2021-12-14 RX ADMIN — PANTOPRAZOLE SODIUM 40 MG: 40 INJECTION, POWDER, FOR SOLUTION INTRAVENOUS at 07:22

## 2021-12-14 RX ADMIN — MIDAZOLAM 1 MG: 1 INJECTION INTRAMUSCULAR; INTRAVENOUS at 09:36

## 2021-12-14 RX ADMIN — MIDAZOLAM 0.5 MG: 1 INJECTION INTRAMUSCULAR; INTRAVENOUS at 09:58

## 2021-12-14 RX ADMIN — LIDOCAINE HYDROCHLORIDE 4 ML: 10 INJECTION, SOLUTION EPIDURAL; INFILTRATION; INTRACAUDAL; PERINEURAL at 09:34

## 2021-12-14 RX ADMIN — IODIXANOL 15 ML: 320 INJECTION, SOLUTION INTRAVASCULAR at 11:21

## 2021-12-14 ASSESSMENT — MIFFLIN-ST. JEOR: SCORE: 1682.57

## 2021-12-14 NOTE — DISCHARGE INSTRUCTIONS
[facility]         Interventional Radiology  Discharge Instructions Post Y90 Delivery    AFTER YOU GO HOME  ? If you were given sedation, for the first 24 hours: we recommend that an adult stay with you, DO NOT drive or operate machinery at home or at work, DO NOT smoke, DO NOT make any important or legal decisions.  ? DO NOT drink alcoholic beverages the day of your procedure  ? DO relax and take it easy for 24 hours  ? DO drink plenty of fluids  ? DO resume your regular diet, unless otherwise instructed by your Primary Physician  ? DO NOT take a shower for at least 12 hours following your procedure. No tub bath, hot tubs, or swimming for 5 days. Do NOT scrub the procedure site vigorously for 5 days.          Care of wrist or arm site  It is normal to have soreness at the puncture site and mild tingling in your hand for up to 3 days.    For 2 days, do not use your hand or arm to support your weight (such as rising from a chair) or bend your wrist (such as lifting a garage door).    For 2 days, do not do any strenuous exercise, do not lift more than 5 pounds or exercise your arm (tennis, golf or bowling).    No lotion or powder to the puncture site for 3 days  If you start bleeding from the site in your arm:    Sit down and press firmly on the site with your fingers for 10 minutes. Call your doctor as soon as you can.    If the bleeding stops, sit still and keep your wrist straight for 2 hours.    Call 911 right away if you have: Heavy bleeding, bleeding that does not stop.    CALL THE PHYSICIAN IF:  ? You start bleeding from the procedure site.  ? You have numbness, coolness or change in color of the arm or leg of the puncture site  ? You experience changes in your vision, hearing, balance, coordination, speech, thinking or memory  ? You experience weakness in one or more extremity  ? You experience pain or redness at the puncture site  ? You develop nausea or vomiting  ? You develop hives or a  rash or unexplained itching  ? You develop a temperature of 101 degrees F or greater      Stroke - Call 911    Remember: BE FAST        BALANCE--Sudden loss of balance or coordination. Example: trouble walking      EYES--Sudden problem seeing out of one or both eyes      FACE--Sudden numbness or change to one side of the face. Examples: facial droop, uneven smile      ARM--Sudden numbness or weakness in one arm or leg      SPEECH--Sudden changes in speech or talking that doesn t make sense      TIME--if the person is having any of the symptoms above, CALL 911 immediately.      Symptoms may go away, then come back.      Sudden, worst headache of your life    [facility] INTERVENTIONAL RADIOLOGY DEPARTMENT  Procedure Physician: Dr. Dimas Guaman  Date of procedure: December 14, 2021  Telephone Numbers: [phone] Monday-Friday 8:00 am to 4:30 pm  [phone] After 4:30 pm Monday-Friday, Weekends & Holidays.   Ask for the Neuro-Interventional doctor on call.  Someone is on call 24 hrs/day  [facility] toll free number: [phone] Monday-Friday 8:00 am to 4:30 pm  [facility] Emergency Dept: [phone]

## 2021-12-14 NOTE — PROGRESS NOTES
PIV discontinued. Discharge paperwork reviewed with patient and his wife Felicia. Escorted to lobby for transport home.

## 2021-12-14 NOTE — PROCEDURES
[facility]    Procedure: IR Procedure Note    Date/Time: 12/14/2021 10:20 AM  Performed by: Rohan Lai MD  Authorized by: Rohan Lai MD   IR Fellow Physician:  Radiology Resident Physician: Dimas Guaman        UNIVERSAL PROTOCOL   Site Marked: NA  Prior Images Obtained and Reviewed:  Yes  Required items: Required blood products, implants, devices and special equipment available    Patient identity confirmed:  Verbally with patient, arm band, provided demographic data and hospital-assigned identification number  Patient was reevaluated immediately before administering moderate or deep sedation or anesthesia  Confirmation Checklist:  Patient's identity using two indicators, relevant allergies, procedure was appropriate and matched the consent or emergent situation and correct equipment/implants were available  Time out: Immediately prior to the procedure a time out was called    Universal Protocol: the Joint Commission Universal Protocol was followed    Preparation: Patient was prepped and draped in usual sterile fashion       ANESTHESIA    Anesthesia: Local infiltration  Local Anesthetic:  Lidocaine 1% without epinephrine      SEDATION  Patient Sedated: Yes    Sedation:  Midazolam and fentanyl  Vital signs: Vital signs monitored during sedation    See dictated procedure note for full details.  Findings: Successful Y90 radioembolization    Specimens: none    Complications: None    Condition: Stable    Plan: Return to patient care unit      PROCEDURE    Patient Tolerance:  Patient tolerated the procedure well with no immediate complications  Length of time physician/provider present for 1:1 monitoring during sedation: 90

## 2021-12-14 NOTE — PRE-PROCEDURE
GENERAL PRE-PROCEDURE:   Procedure:  Y90 radioembolization  Date/Time:  12/14/2021 7:45 AM    Verbal consent obtained?: Yes    Written consent obtained?: Yes    Risks and benefits: Risks, benefits and alternatives were discussed    Consent given by:  Patient  Patient states understanding of procedure being performed: Yes    Patient's understanding of procedure matches consent: Yes    Procedure consent matches procedure scheduled: Yes    Expected level of sedation:  Moderate  Appropriately NPO:  Yes  ASA Class:  2  Mallampati  :  Grade 1- soft palate, uvula, tonsillar pillars, and posterior pharyngeal wall visible  Lungs:  Lungs clear with good breath sounds bilaterally  Heart:  Normal heart sounds and rate  History & Physical reviewed:  History and physical reviewed and no updates needed  Statement of review:  I have reviewed the lab findings, diagnostic data, medications, and the plan for sedation

## 2021-12-14 NOTE — PROGRESS NOTES
Prep complete for Y 90 Delivery. . Patients wife Felicia at bedside will transport patient home post procedure cell# [phone].

## 2021-12-14 NOTE — IR NOTE
Patient Name: Mehdi Martinez  Medical Record Number: [id number]  Today's Date: 12/14/2021    Procedure: Y-90 radioembolization of liver tumor    Proceduralist: Rohan Lai MD and Dimas Guaman MD    Pathology present: na    Procedure Start: 0932  Procedure end: 1016  Sedation medications administered: 75 mcg fentanyl and 1.5 mg Versed     Report given to: RAY Alcantar RN  : akil    Left radial access with TR band on at 1015 with 14 cc's of air.     Other Notes: Pt arrived to IR room 1 from . Consent reviewed. Pt denies any questions or concerns regarding procedure. Pt positioned supine and monitored per protocol. Pt tolerated procedure without any noted complications. Pt transferred back to .

## 2021-12-14 NOTE — PROGRESS NOTES
Patient returned to 2A post Y90 delivery.  VSS. Denies pain.  Left wrist with TR band in place - swollen with hematoma proximal to TR band.  Dr. Guaman at bedside and said swelling/hematoma was stable.  Area marked, will continue to monitor.  PO food and fluids at bedside.

## 2021-12-22 ENCOUNTER — TELEPHONE (OUTPATIENT)
Dept: VASCULAR SURGERY | Facility: CLINIC | Age: 66
End: 2021-12-22
Payer: COMMERCIAL

## 2021-12-22 DIAGNOSIS — C22.0 HCC (HEPATOCELLULAR CARCINOMA) (H): Primary | ICD-10-CM

## 2021-12-22 NOTE — TELEPHONE ENCOUNTER
Called pt to fup on him s/p Y90 treatment    He states that he was feeling fine the first 2 days and then he felt very sick    He states that his left arm hurst where he was accessed for radial puncture.     Movement is fine-able to close and open fingers   Painful and it hurts above wrist- rates it 5/10, bruising type.     Denies numbness or tingling with fingers; able to move with out issues     He states that it's quite bruised and does feel that it's getting better.     He states that last night it was painful.     He's quite tired from the Y90.   Has some nausea; hasn't tried the medication; asked him to try.     Denies vomiting and fevers.    Appetite? Able to eat and drink fluids.     He states that he's been trying to snack but is eating     Informed him that fatigue is quite common and can last up to 2 weeks. Encouraged him to get and up and move around and try each day to move a little further.       He states that he will do so.     We talked about s/s of the wrist pain that he's having and what to watch for.     He states that he's trying to not take the pain medication.     I informed him that we will plan for follow up in 1 mos time with imaging and that our  will reach out to him with this information.    He is to call with any other questions.     Jana LIZAMA RN, BSN  Interventional Radiology/Vascular  Nurse Coordinator   Phone: [phone]  Fax: [phone]

## 2021-12-27 ENCOUNTER — TELEPHONE (OUTPATIENT)
Dept: VASCULAR SURGERY | Facility: CLINIC | Age: 66
End: 2021-12-27
Payer: COMMERCIAL

## 2021-12-27 DIAGNOSIS — K76.82 HEPATIC ENCEPHALOPATHY (H): ICD-10-CM

## 2021-12-28 ENCOUNTER — TELEPHONE (OUTPATIENT)
Dept: GASTROENTEROLOGY | Facility: CLINIC | Age: 66
End: 2021-12-28
Payer: COMMERCIAL

## 2021-12-28 DIAGNOSIS — C22.0 HCC (HEPATOCELLULAR CARCINOMA) (H): ICD-10-CM

## 2021-12-28 NOTE — TELEPHONE ENCOUNTER
Called insurance at [phone] and Rep Nighat stated that PA Request was initiated on 12/27/2021 through [employer] and was Denied on 12/27/2021. Requested reason for Denial, Denial Rational: Medication coverage with diagnosis of Hepatic encephalopathy and tried/failed lactulose, neomycin, and metronidazole. Requested Denial Letter be sent via fax.

## 2021-12-28 NOTE — TELEPHONE ENCOUNTER
Prior Authorization Retail Medication Request    Medication/Dose: Rifaximin 550 mg  ICD code (if different than what is on RX):    Previously Tried and Failed:  Lactulose  Rationale:      Insurance Name:    Insurance ID:        Pharmacy Information (if different than what is on RX)  Name:    Phone:

## 2021-12-29 NOTE — TELEPHONE ENCOUNTER
Central Prior Authorization Team   Phone: [phone]      PRIOR AUTHORIZATION DENIED    Medication: rifaximin (XIFAXAN) 550 MG TABS tablet  - DENIED    Denial Date: 12/29/2021    Denial Rational:       Appeal Information:

## 2022-01-12 RX ORDER — PANTOPRAZOLE SODIUM 40 MG/1
40 TABLET, DELAYED RELEASE ORAL DAILY
Qty: 90 TABLET | Refills: 3 | Status: SHIPPED | OUTPATIENT
Start: 2022-01-12

## 2022-01-12 NOTE — TELEPHONE ENCOUNTER
"Spoke with patient    States he did get Rifaximin approved, local doc 'signed off\" on it.      Says he has been taking it once a day but supposed to take it twice a day.  Not been on lactulose for one year.  Seems a little more clouded, forgetful.  Reviewed that the needs to be taking Rifaximin twice a day not once.  If still confused, may need to discuss with Willard if needs to start taking lactulose again.      Cardiology has been trying to reach for heart cath scheduling, patient has not followed through.  States he got call from cardiology but didn't remember he had to schedule.  Letter was sent to patient home.  Followed up, making sure they understand urgency, urgency to get done for listing to start clock ticking on bonus points.  Explained that I had also left message for Mehdi 2 weeks ago, but patient doesn't always respond to voice mails per his wife.     Spoke with patients wife to go over all of this as patient has been more forgetful.     Labs and MRI 1/26.        "

## 2022-01-13 ENCOUNTER — TELEPHONE (OUTPATIENT)
Dept: CARDIOLOGY | Facility: CLINIC | Age: 67
End: 2022-01-13
Payer: COMMERCIAL

## 2022-01-13 NOTE — TELEPHONE ENCOUNTER
[facility] Call Center    Phone Message    May a detailed message be left on voicemail: yes     Reason for Call: Other: Pt needs scheduling for angiogram. He has upcoming liver surgery. Please call Mehdi to schedule. Thank-you.     Action Taken: Message routed to:  [facility] cardiology    Travel Screening: Not Applicable

## 2022-01-14 NOTE — TELEPHONE ENCOUNTER
Called pt to review angiogram instructions and scheduling. Answered pt questions and discussed which medications to hold prior to procedure. Pt reports understanding and denies further questions. I stated we would call and schedule angiogram and let him know the time and date after that.     Dixon Wayne, RN   Cardiology Nurse Coordinator

## 2022-01-17 DIAGNOSIS — Z11.59 ENCOUNTER FOR SCREENING FOR OTHER VIRAL DISEASES: Primary | ICD-10-CM

## 2022-01-18 ENCOUNTER — LAB (OUTPATIENT)
Dept: LAB | Facility: CLINIC | Age: 67
End: 2022-01-18
Attending: RADIOLOGY
Payer: COMMERCIAL

## 2022-01-18 DIAGNOSIS — Z11.59 ENCOUNTER FOR SCREENING FOR OTHER VIRAL DISEASES: ICD-10-CM

## 2022-01-18 DIAGNOSIS — K74.60 CIRRHOSIS OF LIVER WITHOUT ASCITES, UNSPECIFIED HEPATIC CIRRHOSIS TYPE (H): ICD-10-CM

## 2022-01-18 PROCEDURE — U0003 INFECTIOUS AGENT DETECTION BY NUCLEIC ACID (DNA OR RNA); SEVERE ACUTE RESPIRATORY SYNDROME CORONAVIRUS 2 (SARS-COV-2) (CORONAVIRUS DISEASE [COVID-19]), AMPLIFIED PROBE TECHNIQUE, MAKING USE OF HIGH THROUGHPUT TECHNOLOGIES AS DESCRIBED BY CMS-2020-01-R: HCPCS

## 2022-01-18 PROCEDURE — U0005 INFEC AGEN DETEC AMPLI PROBE: HCPCS

## 2022-01-19 ENCOUNTER — TELEPHONE (OUTPATIENT)
Dept: VASCULAR SURGERY | Facility: CLINIC | Age: 67
End: 2022-01-19

## 2022-01-19 LAB — SARS-COV-2 RNA RESP QL NAA+PROBE: NEGATIVE

## 2022-01-19 RX ORDER — AMILORIDE HYDROCHLORIDE 5 MG/1
10 TABLET ORAL DAILY
Qty: 180 TABLET | Refills: 3 | Status: SHIPPED | OUTPATIENT
Start: 2022-01-19 | End: 2023-01-26

## 2022-01-19 NOTE — TELEPHONE ENCOUNTER
Called and LVM for Pt requesting return call to clinic to discuss upcoming appt.  Called then and spoke with Pt's wife.  She agreed to complete appt virtually after MRI and labs are completed.  Emmie verbalized understanding, agreed to current plan and denied any further questions.    Mavis Wood LPN

## 2022-01-20 ENCOUNTER — TELEPHONE (OUTPATIENT)
Dept: CARDIOLOGY | Facility: CLINIC | Age: 67
End: 2022-01-20
Payer: COMMERCIAL

## 2022-01-20 NOTE — TELEPHONE ENCOUNTER
Call complete for pre procedure reminder, travel screen and updated visitor policy.  COVID Negative  Daphne Bravo RN

## 2022-01-21 ENCOUNTER — HOSPITAL ENCOUNTER (OUTPATIENT)
Facility: CLINIC | Age: 67
Discharge: HOME | End: 2022-01-21
Attending: INTERNAL MEDICINE | Admitting: INTERNAL MEDICINE
Payer: COMMERCIAL

## 2022-01-21 ENCOUNTER — APPOINTMENT (OUTPATIENT)
Dept: MEDSURG UNIT | Facility: CLINIC | Age: 67
End: 2022-01-21
Attending: INTERNAL MEDICINE
Payer: COMMERCIAL

## 2022-01-21 ENCOUNTER — APPOINTMENT (OUTPATIENT)
Dept: LAB | Facility: CLINIC | Age: 67
End: 2022-01-21
Attending: INTERNAL MEDICINE
Payer: COMMERCIAL

## 2022-01-21 VITALS
RESPIRATION RATE: 14 BRPM | WEIGHT: 193 LBS | TEMPERATURE: 98.1 F | HEIGHT: 70 IN | SYSTOLIC BLOOD PRESSURE: 162 MMHG | OXYGEN SATURATION: 97 % | HEART RATE: 79 BPM | DIASTOLIC BLOOD PRESSURE: 83 MMHG | BODY MASS INDEX: 27.63 KG/M2

## 2022-01-21 DIAGNOSIS — C22.0 HCC (HEPATOCELLULAR CARCINOMA) (H): ICD-10-CM

## 2022-01-21 DIAGNOSIS — Z98.890 S/P CORONARY ANGIOGRAM: Primary | ICD-10-CM

## 2022-01-21 DIAGNOSIS — Z01.810 ENCOUNTER FOR PREPROCEDURAL CARDIOVASCULAR EXAMINATION: ICD-10-CM

## 2022-01-21 LAB
AFP SERPL-MCNC: 9.2 UG/L (ref 0–8)
ALBUMIN SERPL-MCNC: 3 G/DL (ref 3.4–5)
ALP SERPL-CCNC: 207 U/L (ref 40–150)
ALT SERPL W P-5'-P-CCNC: 37 U/L (ref 0–70)
ANION GAP SERPL CALCULATED.3IONS-SCNC: 6 MMOL/L (ref 3–14)
AST SERPL W P-5'-P-CCNC: 51 U/L (ref 0–45)
ATRIAL RATE - MUSE: 83 BPM
BILIRUB DIRECT SERPL-MCNC: 0.6 MG/DL (ref 0–0.2)
BILIRUB SERPL-MCNC: 1.8 MG/DL (ref 0.2–1.3)
BUN SERPL-MCNC: 9 MG/DL (ref 7–30)
CALCIUM SERPL-MCNC: 9.1 MG/DL (ref 8.5–10.1)
CHLORIDE BLD-SCNC: 112 MMOL/L (ref 94–109)
CO2 SERPL-SCNC: 24 MMOL/L (ref 20–32)
CREAT SERPL-MCNC: 0.6 MG/DL (ref 0.66–1.25)
DIASTOLIC BLOOD PRESSURE - MUSE: NORMAL MMHG
ERYTHROCYTE [DISTWIDTH] IN BLOOD BY AUTOMATED COUNT: 15 % (ref 10–15)
GFR SERPL CREATININE-BSD FRML MDRD: >90 ML/MIN/1.73M2
GLUCOSE BLD-MCNC: 175 MG/DL (ref 70–99)
GLUCOSE BLDC GLUCOMTR-MCNC: 131 MG/DL (ref 70–99)
HCT VFR BLD AUTO: 44.3 % (ref 40–53)
HGB BLD-MCNC: 14.7 G/DL (ref 13.3–17.7)
INR PPP: 1.25 (ref 0.85–1.15)
INTERPRETATION ECG - MUSE: NORMAL
MCH RBC QN AUTO: 30.5 PG (ref 26.5–33)
MCHC RBC AUTO-ENTMCNC: 33.2 G/DL (ref 31.5–36.5)
MCV RBC AUTO: 92 FL (ref 78–100)
P AXIS - MUSE: 53 DEGREES
PLATELET # BLD AUTO: 88 10E3/UL (ref 150–450)
POTASSIUM BLD-SCNC: 3.8 MMOL/L (ref 3.4–5.3)
PR INTERVAL - MUSE: 162 MS
PROT SERPL-MCNC: 5.9 G/DL (ref 6.8–8.8)
QRS DURATION - MUSE: 88 MS
QT - MUSE: 376 MS
QTC - MUSE: 441 MS
R AXIS - MUSE: -14 DEGREES
RBC # BLD AUTO: 4.82 10E6/UL (ref 4.4–5.9)
SODIUM SERPL-SCNC: 142 MMOL/L (ref 133–144)
SYSTOLIC BLOOD PRESSURE - MUSE: NORMAL MMHG
T AXIS - MUSE: 49 DEGREES
VENTRICULAR RATE- MUSE: 83 BPM
WBC # BLD AUTO: 13.5 10E3/UL (ref 4–11)

## 2022-01-21 PROCEDURE — 85027 COMPLETE CBC AUTOMATED: CPT | Performed by: INTERNAL MEDICINE

## 2022-01-21 PROCEDURE — 82962 GLUCOSE BLOOD TEST: CPT

## 2022-01-21 PROCEDURE — C1887 CATHETER, GUIDING: HCPCS | Performed by: INTERNAL MEDICINE

## 2022-01-21 PROCEDURE — 250N000009 HC RX 250: Performed by: INTERNAL MEDICINE

## 2022-01-21 PROCEDURE — 999N000134 HC STATISTIC PP CARE STAGE 3

## 2022-01-21 PROCEDURE — 999N000142 HC STATISTIC PROCEDURE PREP ONLY

## 2022-01-21 PROCEDURE — 272N000001 HC OR GENERAL SUPPLY STERILE: Performed by: INTERNAL MEDICINE

## 2022-01-21 PROCEDURE — 250N000013 HC RX MED GY IP 250 OP 250 PS 637: Performed by: INTERNAL MEDICINE

## 2022-01-21 PROCEDURE — 999N000054 HC STATISTIC EKG NON-CHARGEABLE

## 2022-01-21 PROCEDURE — 250N000011 HC RX IP 250 OP 636: Performed by: INTERNAL MEDICINE

## 2022-01-21 PROCEDURE — C1894 INTRO/SHEATH, NON-LASER: HCPCS | Performed by: INTERNAL MEDICINE

## 2022-01-21 PROCEDURE — 82248 BILIRUBIN DIRECT: CPT | Performed by: RADIOLOGY

## 2022-01-21 PROCEDURE — 93454 CORONARY ARTERY ANGIO S&I: CPT | Performed by: INTERNAL MEDICINE

## 2022-01-21 PROCEDURE — 82105 ALPHA-FETOPROTEIN SERUM: CPT | Performed by: RADIOLOGY

## 2022-01-21 PROCEDURE — 93005 ELECTROCARDIOGRAM TRACING: CPT

## 2022-01-21 PROCEDURE — 258N000003 HC RX IP 258 OP 636: Performed by: INTERNAL MEDICINE

## 2022-01-21 PROCEDURE — 85610 PROTHROMBIN TIME: CPT | Performed by: INTERNAL MEDICINE

## 2022-01-21 PROCEDURE — 80053 COMPREHEN METABOLIC PANEL: CPT | Performed by: INTERNAL MEDICINE

## 2022-01-21 PROCEDURE — 36415 COLL VENOUS BLD VENIPUNCTURE: CPT | Performed by: RADIOLOGY

## 2022-01-21 PROCEDURE — 99152 MOD SED SAME PHYS/QHP 5/>YRS: CPT | Performed by: INTERNAL MEDICINE

## 2022-01-21 PROCEDURE — 93010 ELECTROCARDIOGRAM REPORT: CPT | Performed by: INTERNAL MEDICINE

## 2022-01-21 RX ORDER — POTASSIUM CHLORIDE 750 MG/1
20 TABLET, EXTENDED RELEASE ORAL
Status: DISCONTINUED | OUTPATIENT
Start: 2022-01-21 | End: 2022-01-21 | Stop reason: HOSPADM

## 2022-01-21 RX ORDER — IOPAMIDOL 755 MG/ML
INJECTION, SOLUTION INTRAVASCULAR
Status: DISCONTINUED | OUTPATIENT
Start: 2022-01-21 | End: 2022-01-21 | Stop reason: HOSPADM

## 2022-01-21 RX ORDER — NICARDIPINE HYDROCHLORIDE 2.5 MG/ML
INJECTION INTRAVENOUS
Status: DISCONTINUED | OUTPATIENT
Start: 2022-01-21 | End: 2022-01-21 | Stop reason: HOSPADM

## 2022-01-21 RX ORDER — FENTANYL CITRATE 50 UG/ML
25 INJECTION, SOLUTION INTRAMUSCULAR; INTRAVENOUS
Status: DISCONTINUED | OUTPATIENT
Start: 2022-01-21 | End: 2022-01-21 | Stop reason: HOSPADM

## 2022-01-21 RX ORDER — ATROPINE SULFATE 0.1 MG/ML
0.5 INJECTION INTRAVENOUS
Status: DISCONTINUED | OUTPATIENT
Start: 2022-01-21 | End: 2022-01-21 | Stop reason: HOSPADM

## 2022-01-21 RX ORDER — ASPIRIN 81 MG/1
243 TABLET, CHEWABLE ORAL ONCE
Status: COMPLETED | OUTPATIENT
Start: 2022-01-21 | End: 2022-01-21

## 2022-01-21 RX ORDER — FENTANYL CITRATE 50 UG/ML
INJECTION, SOLUTION INTRAMUSCULAR; INTRAVENOUS
Status: DISCONTINUED | OUTPATIENT
Start: 2022-01-21 | End: 2022-01-21 | Stop reason: HOSPADM

## 2022-01-21 RX ORDER — FLUMAZENIL 0.1 MG/ML
0.2 INJECTION, SOLUTION INTRAVENOUS
Status: DISCONTINUED | OUTPATIENT
Start: 2022-01-21 | End: 2022-01-21 | Stop reason: HOSPADM

## 2022-01-21 RX ORDER — NALOXONE HYDROCHLORIDE 0.4 MG/ML
0.4 INJECTION, SOLUTION INTRAMUSCULAR; INTRAVENOUS; SUBCUTANEOUS
Status: DISCONTINUED | OUTPATIENT
Start: 2022-01-21 | End: 2022-01-21 | Stop reason: HOSPADM

## 2022-01-21 RX ORDER — LIDOCAINE 40 MG/G
CREAM TOPICAL
Status: DISCONTINUED | OUTPATIENT
Start: 2022-01-21 | End: 2022-01-21 | Stop reason: HOSPADM

## 2022-01-21 RX ORDER — SODIUM CHLORIDE 9 MG/ML
INJECTION, SOLUTION INTRAVENOUS CONTINUOUS
Status: DISCONTINUED | OUTPATIENT
Start: 2022-01-21 | End: 2022-01-21 | Stop reason: HOSPADM

## 2022-01-21 RX ORDER — HEPARIN SODIUM 1000 [USP'U]/ML
INJECTION, SOLUTION INTRAVENOUS; SUBCUTANEOUS
Status: DISCONTINUED | OUTPATIENT
Start: 2022-01-21 | End: 2022-01-21 | Stop reason: HOSPADM

## 2022-01-21 RX ORDER — POTASSIUM CHLORIDE 750 MG/1
40 TABLET, EXTENDED RELEASE ORAL
Status: DISCONTINUED | OUTPATIENT
Start: 2022-01-21 | End: 2022-01-21 | Stop reason: HOSPADM

## 2022-01-21 RX ORDER — NALOXONE HYDROCHLORIDE 0.4 MG/ML
0.2 INJECTION, SOLUTION INTRAMUSCULAR; INTRAVENOUS; SUBCUTANEOUS
Status: DISCONTINUED | OUTPATIENT
Start: 2022-01-21 | End: 2022-01-21 | Stop reason: HOSPADM

## 2022-01-21 RX ORDER — ASPIRIN 325 MG
325 TABLET ORAL ONCE
Status: COMPLETED | OUTPATIENT
Start: 2022-01-21 | End: 2022-01-21

## 2022-01-21 RX ORDER — NITROGLYCERIN 5 MG/ML
VIAL (ML) INTRAVENOUS
Status: DISCONTINUED | OUTPATIENT
Start: 2022-01-21 | End: 2022-01-21 | Stop reason: HOSPADM

## 2022-01-21 RX ADMIN — SODIUM CHLORIDE: 9 INJECTION, SOLUTION INTRAVENOUS at 09:44

## 2022-01-21 RX ADMIN — ASPIRIN 325 MG ORAL TABLET 325 MG: 325 PILL ORAL at 09:41

## 2022-01-21 ASSESSMENT — MIFFLIN-ST. JEOR: SCORE: 1666.69

## 2022-01-21 NOTE — PROGRESS NOTES
D/I/A: Pt roomed on 3C in bay 34.  Arrived via litter and accompanied by CL RN. On/Off: Off monitor.  VSSA.  Rhythm upon arrival SR on monitor.  Denies pain or sob.  Reviewed activity restrictions and when to notify RN, ie-changes to breathing or increased chest pressure or chest pain.  CCL access:  R TR band. CMS intact.  P: Continue to monitor status.  Discharge to home once meeting criteria.

## 2022-01-21 NOTE — PROGRESS NOTES
Pt arrives to 2A in room 12. Pt arrives alone, wife will transport pt home after procdure. Pt VSS, AOx4. Prep completed, including placement of PIV, groin prep, aspirin given, and labs are complete. No intervention needed on labs. Will continue to assess pt until taken for procedure.

## 2022-01-21 NOTE — PROGRESS NOTES
D/I/A:  Patient is tolerating liquids and foods, ambulating, urinating, puncture site stable (no bleeding and no hematoma) and patient has a .  A+O x4 and making needs known.  CCL access site C/D/I; no bleeding or hematoma; CMS intact.  VSSA.  SR on monitor.  IV access removed.  Education completed and outlined in AVS or handout: medications reviewed with patient.  Questions answered prior to discharge.  Belongings returned to patient at discharge.    P: Discharged to self care.  Patient to follow up with appts as per discharge instruction.

## 2022-01-21 NOTE — DISCHARGE INSTRUCTIONS
Going Home after an Angioplasty or Stent Placement (Cardiac)  ______________________________________________      After you go home:    Have an adult stay with you for 24 hours.    Drink plenty of fluids.    You may eat your normal diet, unless your doctor tells you otherwise.    For 24 hours:    Relax and take it easy.    Do NOT smoke.    Do NOT make any important or legal decisions.    Do NOT drive or operate machines at home or at work.    Do NOT drink alcohol.    Remove the Band-Aid after 24 hours. If there is minor oozing, apply another Band-aid and remove it after 12 hours.    For 2 days, do NOT have sex or do any heavy exercise.    Do NOT take a bath, or use a hot tub or pool for at least 3 days. You may shower.    Care of wrist or arm site  It is normal to have soreness at the puncture site and mild tingling in your hand for up to 3 days.    For 2 days, do not use your hand or arm to support your weight (such as rising from a chair) or bend your wrist (such as lifting a garage door).    For 2 days, do not lift more than 5 pounds or exercise your arm (tennis, golf or bowling).    If you start bleeding from the site in your arm:    Sit down and press firmly on the site with your fingers for 10 minutes. Call your doctor as soon as you can.    If the bleeding stops, sit still and keep your wrist straight for 2 hours.    Medicines    If you have started taking Plavix or Effient, do not stop taking it until you talk to your heart doctor (cardiologist).    If you are on metformin (Glucophage), do not restart it until you have blood tests (within 2 to 3 days after discharge). When your doctor tells you it is safe, you may restart the metformin.    If you have stopped any other medicines, check with your nurse or provider about when to restart them.    Call 911 right away if you have bleeding that is heavy or does not stop.    Call your doctor if:    You have a large or growing hard lump around the site.    The  site is red, swollen, hot or tender.    Blood or fluid is draining from the site.    You have chills or a fever greater than 101 F (38 C).    Your leg or arm feels numb or cool.    You have hives, a rash or unusual itching.      [facility] [place]:  [phone] (7 days a week)

## 2022-01-21 NOTE — H&P
History and Physical     Mehdi Martinez  MRN# [id number]        Date of Admission:  1/21/2022    HPI: Mehdi Martinez is a 66 year old male with PMHx of End-stage liver disease due to alcoholic cirrhosi, s/p TIPS, hepatocellular carcinoma, chronic hepatitis C, chronic lymphocytic leukemia cirrhosis and DM II who presents to [facility] today for elective coronary angiogram as part of pre-liver transplant work up. Saw Dr. Frye in 11/2021 at which time he was feeling well. Most recent Dobutamine stress echocardiogram without concern with normal pulmonary artery pressures. Recent chest CT showed some minor coronary artery calcification. No changes in health history since last visit. Remains active without cardiac symptoms. Took ASA this am.    Past Medical History:  Past Medical History:   Diagnosis Date     Alcohol abuse     quit September 2015     Allergic rhinitis      Aortic calcification (H)      Asthma      Cancer (H)      Chronic hepatitis C with cirrhosis (H)      Diabetes mellitus, type II (H)      Diverticulosis      HTN (hypertension)      Portal hypertension (H)      SBP (spontaneous bacterial peritonitis) (H) Jan 2016     Splenomegaly        Past Surgical History:  Past Surgical History:   Procedure Laterality Date     ARTHROSCOPY SHOULDER  90s     HC ESOPHAGOSCOPY, DIAGNOSTIC  Jan 2015    small varices, portal HTN gastropathy in the stomach, otherwise normal     HERNIA REPAIR      inguinal     HERNIORRHAPHY UMBILICAL N/A 8/18/2016    Procedure: HERNIORRHAPHY UMBILICAL;  Surgeon: Yadira Lutz MD;  Location: [facility] OR     IR SIRT (SELECTIVE INTERNAL RADIO THERAPY)  12/13/2021     IR VISCERAL ANGIOGRAM  12/13/2021     IR VISCERAL EMBOLIZATION  12/14/2021     KNEE SURGERY  80s     LAPAROSCOPY DIAGNOSTIC (GENERAL) N/A 1/28/2016    Procedure: LAPAROSCOPY DIAGNOSTIC (GENERAL);  Surgeon: Jeannine Schneider MD;  Location: [facility] OR     laproscopic cholecystectomy  Nov 2015    liver biopsy performed  at the same time       Allergies:     Allergies   Allergen Reactions     Artemisia Absinthium Difficulty breathing     Acetaminophen Other (See Comments)     Other reaction(s): Avoids due to cirrhosis, hepatitis C  Avoids acetaminophen due to cirrhosis, hepatitis c       Mold      Molds & Smuts      Other reaction(s): Other, see comments  No reaction documented     Other Environmental Allergy Other (See Comments)     Ragweed, no reactions were documented     Ragweeds      Sulfa Drugs Itching     Terfenadine Itching     Itchy rash       Medications:  No current facility-administered medications on file prior to encounter.  Bioflavonoid Products (VITAMIN C) CHEW,   cetirizine (ZYRTEC) 10 MG tablet, Take 10 mg by mouth Reported on 3/6/2017  folic acid (FOLVITE) 1 MG tablet, 1 mg daily  metFORMIN (GLUCOPHAGE) 1000 MG tablet, Take 1,000 mg by mouth daily (with dinner)   methotrexate 2.5 MG tablet, 2.5 mg once a week 6 tablets once per week  Pseudoephedrine-APAP  MG TABS, Take 300 mg by mouth daily   tiZANidine (ZANAFLEX) 4 MG tablet, Take 2-8 mg by mouth  zinc sulfate (ZINCATE) 220 MG capsule, Take 50 mg by mouth daily  fluticasone (FLONASE) 50 MCG/ACT nasal spray, 2 sprays Reported on 3/6/2017  methylPREDNISolone (MEDROL DOSEPAK) 4 MG tablet therapy pack, Take as directed on package.  ondansetron (ZOFRAN) 4 MG tablet, Take 1-2 tablets (4-8 mg) by mouth every 6 hours as needed for nausea (vomiting)        Social History:  Social History     Socioeconomic History     Marital status:      Spouse name: Not on file     Number of children: Not on file     Years of education: Not on file     Highest education level: Not on file   Occupational History     Not on file   Tobacco Use     Smoking status: Current Some Day Smoker     Types: Cigars     Smokeless tobacco: Never Used   Substance and Sexual Activity     Alcohol use: No     Alcohol/week: 0.0 standard drinks     Comment: Hx of heavy beer drinking, None since  Sept 2015     Drug use: No     Sexual activity: Not on file   Other Topics Concern     Parent/sibling w/ CABG, MI or angioplasty before 65F 55M? Not Asked   Social History Narrative     Not on file     Social Determinants of Health     Financial Resource Strain: Not on file   Food Insecurity: Not on file   Transportation Needs: Not on file   Physical Activity: Not on file   Stress: Not on file   Social Connections: Not on file   Intimate Partner Violence: Not on file   Housing Stability: Not on file       Family History:  Family History   Problem Relation Age of Onset     Colon Cancer Mother      Ovarian Cancer Sister        ROS:  Negative besides that noted in HPI    Exam:  Temp:  [97.7  F (36.5  C)] 97.7  F (36.5  C)  Pulse:  [93] 93  Resp:  [18] 18  BP: (146)/(80) 146/80  SpO2:  [97 %] 97 %  General: Alert, interactive, NAD  Eyes: sclera anicteric, EOMI  Resp: Normal respiratory effort  Cardiovascular: regular rate and rhythm  Skin: Warm and dry, no jaundice or rash  Neuro: Alert & oriented x 3, Cns 2-12 intact, moves all extremities equally  Psych: Appropriate    Data:  CMP  Recent Labs   Lab 01/21/22  0857      POTASSIUM 3.8   CHLORIDE 112*   CO2 24   ANIONGAP 6   *   BUN 9   CR 0.60*   GFRESTIMATED >90   JAMES 9.1   PROTTOTAL 5.9*   ALBUMIN 3.0*   BILITOTAL 1.8*   ALKPHOS 207*   AST 51*   ALT 37     CBC  Recent Labs   Lab 01/21/22  0857   WBC 13.5*   RBC 4.82   HGB 14.7   HCT 44.3   MCV 92   MCH 30.5   MCHC 33.2   RDW 15.0   PLT 88*       No results found for: TROPI, TROPONIN, TROPR, TROPN    Assessment  Mehdi Martinez is a 66 year old male with PMHx of End-stage 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    # Coronary artery calcification on CT  # End-stage liver disease due to alcoholic  cirrhosi, s/p TIPS  # Hepatocellular carcinoma  # Chronic hepatitis C  # Chronic lymphocytic leukemia   # DM II    Plan:  Labs stable. Patient feeling well. Proceed with coronary angiogram, as planned    TAMEKA Longoria  Interventional Cardiology  Pager [phone]

## 2022-01-21 NOTE — Clinical Note
dry, intact, no bleeding and no hematoma. 6fr RRA sheath removed,  TR band placed with 15 ml of air

## 2022-01-26 ENCOUNTER — HOSPITAL ENCOUNTER (OUTPATIENT)
Dept: MRI IMAGING | Facility: CLINIC | Age: 67
Discharge: HOME | End: 2022-01-26
Attending: RADIOLOGY | Admitting: RADIOLOGY
Payer: COMMERCIAL

## 2022-01-26 ENCOUNTER — TELEPHONE (OUTPATIENT)
Dept: VASCULAR SURGERY | Facility: CLINIC | Age: 67
End: 2022-01-26

## 2022-01-26 DIAGNOSIS — C22.0 HCC (HEPATOCELLULAR CARCINOMA) (H): ICD-10-CM

## 2022-01-26 LAB — HOLD SPECIMEN: NORMAL

## 2022-01-26 PROCEDURE — 255N000002 HC RX 255 OP 636: Performed by: RADIOLOGY

## 2022-01-26 PROCEDURE — A9585 GADOBUTROL INJECTION: HCPCS | Performed by: RADIOLOGY

## 2022-01-26 PROCEDURE — 74183 MRI ABD W/O CNTR FLWD CNTR: CPT | Mod: 26 | Performed by: RADIOLOGY

## 2022-01-26 PROCEDURE — 74183 MRI ABD W/O CNTR FLWD CNTR: CPT

## 2022-01-26 RX ORDER — GADOBUTROL 604.72 MG/ML
8.5 INJECTION INTRAVENOUS ONCE
Status: COMPLETED | OUTPATIENT
Start: 2022-01-26 | End: 2022-01-26

## 2022-01-26 RX ADMIN — GADOBUTROL 8.5 ML: 604.72 INJECTION INTRAVENOUS at 15:12

## 2022-01-26 NOTE — TELEPHONE ENCOUNTER
Called pt to reschedule his appt with Dr. Lai today due to a family emergency.    Pt will keep his MRI and labs today and then we will push out his virtual appt to Weds 2/2 at 2pm     Apologized for this and pt verbalized understanding.     Jana LIZAMA RN, BSN  Interventional Radiology/Vascular  Nurse Coordinator   Phone: [phone]  Fax: [phone]

## 2022-01-27 ENCOUNTER — DOCUMENTATION ONLY (OUTPATIENT)
Dept: TRANSPLANT | Facility: CLINIC | Age: 67
End: 2022-01-27
Payer: COMMERCIAL

## 2022-01-27 DIAGNOSIS — C91.10 CHRONIC LYMPHATIC LEUKEMIA (H): Primary | ICD-10-CM

## 2022-01-27 DIAGNOSIS — C22.0 LIVER CELL CARCINOMA (H): ICD-10-CM

## 2022-01-27 NOTE — PROGRESS NOTES
Liver Transplant Evaluation/Teaching    TEACHING TOPICS: Evaluation Process, Evaluation Items, Diagnostic Studies, Consultation, Chemical Dependency Policy, CD Eval, Donor Source, Liver Allocation, MELD System, UNOS, Waiting List, Follow up while on transplant list, Follow up after transplantation, Infection and Rejection, Immunosuppression , Medication Teaching, Lab Recording after transplant, Laboratory Frequency after transplant , Consent for evaluation and One year survival rates  INSTRUCTIONAL MATERIAL USED/GIVEN: Liver Transplant Handbook, MELD Booklet, Donor Booklet, Web Sites Options, Verbal instructions, Multiple Listing Brochure , Consent for evaluation signed, One year survival rates and SRTR (Scientific Registry) Data  Person(s) involved in teaching: patient, spouse Emmie  Asks Questions: YES  Eager to Learn: YES  Cooperative: YES  Receptive (willing/able to accept information): YES  Reason for the appointment, diagnosis and treatment plan:YES  Knowledge of proper use of medications and conditions for which they are ordered (with special attention to potential side effects or drug interactions): YES  Which situations necessitate calling provider and whom to contact:YES  Other:   Teaching Concerns Addressed   Comments:   Proper use and care of (medical equip, care aids, etc.):YES  Nutritional needs and diet plan: YES  Pain management techniques: YES  Wound Care: YES  How and/when to access community resources: YES  Patient is aware of and agrees to required commitment to post-op care and long term follow-up: YES  Patient has name and phone number of transplant coordinator.   Time Spent face-to-face/over phone teachin minutes.    Open to all extended criteria for listing

## 2022-01-28 ENCOUNTER — DOCUMENTATION ONLY (OUTPATIENT)
Dept: TRANSPLANT | Facility: CLINIC | Age: 67
End: 2022-01-28
Payer: COMMERCIAL

## 2022-01-28 ENCOUNTER — PATIENT OUTREACH (OUTPATIENT)
Dept: ONCOLOGY | Facility: CLINIC | Age: 67
End: 2022-01-28
Payer: COMMERCIAL

## 2022-01-28 DIAGNOSIS — C22.0 HCC (HEPATOCELLULAR CARCINOMA) (H): Primary | ICD-10-CM

## 2022-01-28 NOTE — PROGRESS NOTES
"New Patient Hematology / Oncology Nurse Navigator Note     Referral Date: 1/27/22    Referring provider:   Juancho Willard MD [facility] OTHER SERVICES     Referred to: Medical Oncology  Preferred Location: [facility]    Evaluation for :  \" pt with CLL and HCC< currently being listed for liver transplant.  Please eval any concerns with CLL management in setting of potential transplant and immunosuppresion \"     Additional Clinical History (per Nurse review of records provided):      6/16/21 office visit with Marlo Hoang MD , [facility] Oncology [facility]   -- BOOKMARKED    Labs this week did not include a differential, unable to add this per [facility] team    Clinical Assessment / Barriers to Care (Per Nurse):  Pt lives in [place], [place]    [facility]     Referral updates and Plan:   January 28, 2022 OUTGOING CALL to pt: Introduced my role as nurse navigator with [facility] Hematology/Oncology dept and that we have recd the referral from [facility], clarified for pt that this is a request from that team who knows that he has hem/onc care with Dr Hoang for CLL.  Explained to pt that they will receive a call from our scheduling intake team in the next business day.  Andreia Abbasi, RN, BSN, OCN  Hematology/Oncology Nurse Navigator   [facility]  [phone]             "

## 2022-01-28 NOTE — PROGRESS NOTES
Most recent MRI imaging dated 1/26/22 reviewed at interdisciplinary tumor conference    Good treatment response at area of prior Y-90 treatment, LR-TR non-viable    2 small areas measuring 1 cm and 8 mm showing some enhancement but no washout, not HCC    Within [place] criteria

## 2022-01-30 ENCOUNTER — DOCUMENTATION ONLY (OUTPATIENT)
Dept: TRANSPLANT | Facility: CLINIC | Age: 67
End: 2022-01-30
Payer: COMMERCIAL

## 2022-01-30 NOTE — LETTER
January 30, 2022    Mehdi Martinez  [address] [facility] [address]      Dear Mr. Martinez,    This letter is sent to confirm that you have completed your transplant work-up and you are a candidate in the liver transplant program at the [facility].  You were placed on the liver active waitlist on 1/27/22.      When you are active on the waitlist and an organ becomes available, a coordinator will need to speak to you immediately.  You could be contacted at any time during the day or night as an organ could become available at any time.  Please make certain our office always has your current telephone numbers and address.      Items we will need from you:      We have received approval from your insurance company for the transplant procedure.  It is critical that you notify us if there is any change in your insurance.  It is also important that you familiarize yourself with the details of your specific insurance policy.  Our patient  is available to assist you if you should have any questions regarding your coverage.      Please inform us immediately if you go to an ER or are admitted to a hospital other than the [facility]      During this waiting period, we may request additional periodic laboratory tests with your primary physician.  It will be your responsibility to remind your physician to forward your results to the Transplant Office.  You will need labs a minimum of very 3 months along with your MRI imaging.        We need to be kept informed of any changes in your medical condition such as:    o changes in your medications,   o significant changes in your health  o significant infections (such as pneumonia or abscesses)  o blood transfusions  o any condition which requires hospitalization  o any surgery      Remember to complete any routine cancer screening tests required before your transplant.  This includes colonoscopy; prostate  screening for men, and mammogram and gynecologic testing for women, as well as dental work.  Your primary care clinic can assist you with arranging for these exams.  Remind your caregivers to forward copies of the records and final reports.    We want you to know that our program has physician and surgeon coverage 24 hours a day, 365 days a year. If this coverage changes or there are substantial program changes, you will be notified in writing by letter.     Attached is a letter from the [employer] Network for Organ Sharing (UNOS). It describes the services and information offered to patients by UNOS and the Organ Procurement and Transplantation Network.    We appreciate having had the opportunity to participate in your care.  If you have questions, please feel free to call the Transplant Office at [phone] or [phone].      Sincerely,    Shante Tamez RN, BSN  Pre-Liver Transplant Coordinator  Phone: [phone]    Solid Organ Transplant  [facility]      Enclosures: UNOS Letter  cc: Care Team                        The Organ Procurement and Transplantation Network  Toll-free patient services line:     Your resource for organ transplant information    If you have a question regarding your own medical care, you always should call your transplant hospital first. However, for general organ transplant-related information, you can call the Organ Procurement and Transplantation Network (OPTN) toll-free patient services line at [phone]. Anyone, including potential transplant candidates, candidates, recipients, family members, friends, living donors, and donor family members, can call this number to:          Talk about organ donation, living donation, the transplant process, the donation process, and transplant policies.    Get a free patient information kit with helpful booklets, waiting list and transplant  information, and a list of all transplant hospitals.    Ask questions about the OPTN website (https://optn.transplant.hrsa.gov/), the [employer] Network for Organ Sharing s (UNOS) website (https://unos.org/), or the UNOS website for living donors and transplant recipients. (https://www.transplantliving.org/).    Learn how the OPTN can help you.    Talk about any concerns that you may have with a transplant hospital.    The [facility] transplant system, the OPTN, is managed under federal contract by the [employer] for Organ Sharing (UNOS), which is a non-profit charitable organization. The OPTN helps create and define organ sharing policies that make the best use of donated organs. This process continuously evaluating new advances and discoveries so policies can be adapted to best serve patients waiting for transplants. To do so, the OPTN works closely with transplant professionals, transplant patients, transplant candidates, donor families, living donors, and the public. All transplant programs and organ procurement organizations throughout the country are OPTN members and are obligated to follow the policies the OPTN creates for allocating organs.    The OPTN also is responsible for:      Providing educational material for patients, the public, and professionals.    Raising awareness of the need for donated organs and tissue.    Coordinating organ procurement, matching, and placement.    Collecting information about every organ transplant and donation that occurs in the [place].    Remember, you should contact your transplant hospital directly if you have questions or concerns about your own medical care including medical records, work-up progress, and test results.    We are not your transplant hospital, and our staff will not be able to answer questions about your case, so please keep your transplant hospital s phone number handy.    However, while you research your transplant needs and learn as much as you  can about transplantation and donation, we welcome your call to our toll-free patient services line at [phone].          Updated 4/1/2019

## 2022-01-31 NOTE — PROGRESS NOTES
New liver listing 1/27/22  Dx: HCC,  Hep C cirrhosis  MELD: 11  ABO: O    Initially HCC exception applied for 1/30/22

## 2022-02-01 NOTE — PROGRESS NOTES
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    Today, patient reports that he had some pain after his procedure and had some nausea/vomiting that has resolved. No icterus or jaundice. Mild ongoing dizziness. Great appetite.       ROS:  Negative unless otherwise stated in HPI.      Physical Examination:   VITALS:   There were no vitals taken for this visit.    Labs:    BMP RESULTS:  Lab Results   Component Value Date     01/26/2022     03/18/2021    POTASSIUM 4.0 01/26/2022    POTASSIUM 4.6 03/18/2021    CHLORIDE 110 (H) 01/26/2022    CHLORIDE 114 (H) 03/18/2021    CO2 23 01/26/2022    CO2 25 03/18/2021    ANIONGAP 8 01/26/2022    ANIONGAP 4 03/18/2021     (H) 01/26/2022     (H) 03/18/2021    BUN 8 01/26/2022    BUN 10 03/18/2021    CR 0.63 (L) 01/26/2022    CR 0.70 03/18/2021    GFRESTIMATED >90 01/26/2022    GFRESTIMATED >90 03/18/2021    GFRESTBLACK >90 03/18/2021    JAMES 9.1 01/26/2022    JAMES 9.4 03/18/2021        CBC RESULTS:  Lab Results   Component Value Date    WBC 16.3 (H) 01/26/2022    WBC 22.1 (H) 03/18/2021    RBC 4.96 01/26/2022    RBC 5.03 03/18/2021    HGB 15.3 01/26/2022    HGB 15.3 03/18/2021    HCT 45.8 01/26/2022    HCT 46.4 03/18/2021    MCV 92 01/26/2022    MCV 92 03/18/2021    MCH 30.8 01/26/2022    MCH 30.4 03/18/2021    MCHC 33.4 01/26/2022    MCHC 33.0 03/18/2021    RDW 15.6 (H) 01/26/2022    RDW 14.6 03/18/2021     (L) 01/26/2022     (L) 03/18/2021       INR/PTT:  Lab Results   Component Value Date    INR 1.20 (H) 01/26/2022    INR 1.22 (H) 03/18/2021    PTT 32 12/13/2021    PTT 31 08/19/2016     AFP 1/26/22: 10.0. previously 9.2 on 1/21/22.    Lab Results   Component Value Date    BILITOTAL 2.1 01/26/2022    BILITOTAL 1.8 01/21/2022     BILITOTAL 1.9 12/14/2021    BILITOTAL 1.7 12/13/2021    BILITOTAL 1.8 11/30/2021    BILITOTAL 1.4 03/18/2021    BILITOTAL 1.9 09/18/2020    BILITOTAL 1.2 03/13/2020    BILITOTAL 1.5 09/04/2019    BILITOTAL 1.0 03/19/2019       Diagnostic studies: Personally reviewed and interpreted.    MR Abdomen 1/26/22: Post TARE changes in segment 4A/4B without findings to suggest recurrent/residual disease. Additional LR-3 lesions in segments 2 and segment 6 noted.     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 No imaging findings to suggest residual disease on recent MRI.     Plan:   -Follow up in 2 months (3 months post procedure) with repeat abdominal MRI. LFTs, and AFP.       Jose Rojas MD  Interventional Radiology Fellow, PGY-5.    I interviewed the patient with the resident and agree with the assessment and plan.    Esme Nam MD    CC  Patient Care Team:  David Heller as PCP - General (Family Practice)  Calos Hernandes MD as MD (Internal Medicine)  Juancho Willard MD as MD (Gastroenterology)  Marlo Hoang as Physician (Hematology & Oncology)  Esme Nam MD as Resident (Radiology)  Juancho Willard MD as Assigned Surgical Provider  Evy Tamez RN as Nurse Coordinator (Transplant)  JOHANNY June MD as Assigned Heart and Vascular Provider  ESME NAM

## 2022-02-02 ENCOUNTER — VIRTUAL VISIT (OUTPATIENT)
Dept: VASCULAR SURGERY | Facility: CLINIC | Age: 67
End: 2022-02-02
Payer: COMMERCIAL

## 2022-02-02 DIAGNOSIS — C22.0 HCC (HEPATOCELLULAR CARCINOMA) (H): Primary | ICD-10-CM

## 2022-02-02 PROCEDURE — 99213 OFFICE O/P EST LOW 20 MIN: CPT | Mod: 95 | Performed by: RADIOLOGY

## 2022-02-02 NOTE — PROGRESS NOTES
[facility] Vascular      Type of Visit: Virtual: Other: [phone]    Patient is here for a return visit to discuss 1 month f/u .     Vitals - Patient Reported  Pain Score: No Pain (0)          Questions patient would like addressed today are: Patient verbalized no questions/concerns, this has been communicated to the provider.     Refills are needed: Yes: folic acid     How would you like to obtain your AVS? Mail a copy    Zach Duff MA

## 2022-02-02 NOTE — NURSING NOTE
Chief Complaint   Patient presents with     Follow Up     1 month f/u, pts states he is having dizziness, and needs a refill of folic acid      Reviewed medications with patient.     Zach Duff, VF/CMA

## 2022-02-02 NOTE — LETTER
2/2/2022       RE: Mehdi Martinez  [address]  [place] [address]     Dear Colleague,    Thank you for referring your patient, Mehdi Martinez, to the [facility] at [facility]. Please see a copy of my visit note below.        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    Today, patient reports that he had some pain after his procedure and had some nausea/vomiting that has resolved. No icterus or jaundice. Mild ongoing dizziness. Great appetite.       ROS:  Negative unless otherwise stated in HPI.      Physical Examination:   VITALS:   There were no vitals taken for this visit.    Labs:    BMP RESULTS:  Lab Results   Component Value Date     01/26/2022     03/18/2021    POTASSIUM 4.0 01/26/2022    POTASSIUM 4.6 03/18/2021    CHLORIDE 110 (H) 01/26/2022    CHLORIDE 114 (H) 03/18/2021    CO2 23 01/26/2022    CO2 25 03/18/2021    ANIONGAP 8 01/26/2022    ANIONGAP 4 03/18/2021     (H) 01/26/2022     (H) 03/18/2021    BUN 8 01/26/2022    BUN 10 03/18/2021    CR 0.63 (L) 01/26/2022    CR 0.70 03/18/2021    GFRESTIMATED >90 01/26/2022    GFRESTIMATED >90 03/18/2021    GFRESTBLACK >90 03/18/2021    JAMES 9.1 01/26/2022    JAMES 9.4 03/18/2021        CBC RESULTS:  Lab Results   Component Value Date    WBC 16.3 (H) 01/26/2022    WBC 22.1 (H) 03/18/2021    RBC 4.96 01/26/2022    RBC 5.03 03/18/2021    HGB 15.3 01/26/2022    HGB 15.3 03/18/2021    HCT 45.8 01/26/2022    HCT 46.4 03/18/2021    MCV 92 01/26/2022    MCV 92 03/18/2021    MCH 30.8 01/26/2022    MCH 30.4 03/18/2021    MCHC 33.4 01/26/2022    MCHC 33.0 03/18/2021    RDW 15.6 (H) 01/26/2022    RDW 14.6 03/18/2021     (L) 01/26/2022     (L)  03/18/2021       INR/PTT:  Lab Results   Component Value Date    INR 1.20 (H) 01/26/2022    INR 1.22 (H) 03/18/2021    PTT 32 12/13/2021    PTT 31 08/19/2016     AFP 1/26/22: 10.0. previously 9.2 on 1/21/22.    Lab Results   Component Value Date    BILITOTAL 2.1 01/26/2022    BILITOTAL 1.8 01/21/2022    BILITOTAL 1.9 12/14/2021    BILITOTAL 1.7 12/13/2021    BILITOTAL 1.8 11/30/2021    BILITOTAL 1.4 03/18/2021    BILITOTAL 1.9 09/18/2020    BILITOTAL 1.2 03/13/2020    BILITOTAL 1.5 09/04/2019    BILITOTAL 1.0 03/19/2019       Diagnostic studies: Personally reviewed and interpreted.    MR Abdomen 1/26/22: Post TARE changes in segment 4A/4B without findings to suggest recurrent/residual disease. Additional LR-3 lesions in segments 2 and segment 6 noted.     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 No imaging findings to suggest residual disease on recent MRI.     Plan:   -Follow up in 2 months (3 months post procedure) with repeat abdominal MRI. LFTs, and AFP.       Jose Rojas MD  Interventional Radiology Fellow, PGY-5.    I interviewed the patient with the resident and agree with the assessment and plan.    Esme Nam MD      Patient Care Team:  David Heller as PCP - General (Family Practice)  Calos Hernandes MD as MD (Internal Medicine)  Juancho Willard MD as MD (Gastroenterology)  Marlo Hoang as Physician (Hematology & Oncology)  Esme Nam MD as Resident (Radiology)  Juancho Willard MD as Assigned Surgical Provider  Evy Tamez RN as Nurse Coordinator (Transplant)  JOHANNY June MD as Assigned Heart and Vascular Provider  ESME NAM        [facility]      Type of Visit: Virtual: Other: [phone]    Patient is here for a return visit to discuss 1 month f/u .     Vitals - Patient  Reported  Pain Score: No Pain (0)          Questions patient would like addressed today are: Patient verbalized no questions/concerns, this has been communicated to the provider.     Refills are needed: Yes: folic acid     How would you like to obtain your AVS? Mail a copy    Zach Duff MA      Again, thank you for allowing me to participate in the care of your patient.      Sincerely,    Rohan Lai MD

## 2022-02-03 DIAGNOSIS — C91.10 CLL (CHRONIC LYMPHOCYTIC LEUKEMIA) (H): Primary | ICD-10-CM

## 2022-02-04 NOTE — TELEPHONE ENCOUNTER
RECORDS STATUS - ALL OTHER DIAGNOSIS      RECORDS RECEIVED FROM: Epic, [facility]   DATE RECEIVED:    NOTES STATUS DETAILS   OFFICE NOTE from referring provider     OFFICE NOTE from medical oncologist [facility] Dr. Hoang: 21   DISCHARGE SUMMARY from hospital [facility]    DISCHARGE REPORT from the ER [facility] 16, 14   OPERATIVE REPORT     MEDICATION LIST [facility]    CLINICAL TRIAL TREATMENTS TO DATE     LABS     PATHOLOGY REPORTS [facility], Report in CE, slides requested   [employer] 3/10/16: [id number]   ANYTHING RELATED TO DIAGNOSIS Epic/ 22   GENONOMIC TESTING     TYPE:     IMAGING (NEED IMAGES & REPORT)     CT SCANS     MRI     MAMMO     ULTRASOUND     PET

## 2022-02-11 ENCOUNTER — VIRTUAL VISIT (OUTPATIENT)
Dept: ONCOLOGY | Facility: CLINIC | Age: 67
End: 2022-02-11
Attending: INTERNAL MEDICINE
Payer: COMMERCIAL

## 2022-02-11 ENCOUNTER — PRE VISIT (OUTPATIENT)
Dept: ONCOLOGY | Facility: CLINIC | Age: 67
End: 2022-02-11
Payer: COMMERCIAL

## 2022-02-11 DIAGNOSIS — C91.10 CHRONIC LYMPHATIC LEUKEMIA (H): ICD-10-CM

## 2022-02-11 DIAGNOSIS — C22.0 LIVER CELL CARCINOMA (H): ICD-10-CM

## 2022-02-11 PROCEDURE — 99203 OFFICE O/P NEW LOW 30 MIN: CPT | Mod: 95 | Performed by: INTERNAL MEDICINE

## 2022-02-11 NOTE — PROGRESS NOTES
Mehdi Martinez  is being evaluated via a billable video visit.      How would you like to obtain your AVS? Mail a copy  For the video visit, send the invitation by: Text to cell phone: [phone]  Will anyone else be joining your video visit? No      [facility]  New patient history and physical  February 9, 2022  DIAGNOSES:  1. 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 Hepatocellular carcinoma   TREATMENT:  1. CLL: No treatment to date  2. HCC: Y-90 embolization 12/14/2021     Hem/onc History:   Diagnosis and Staging:  CLL:  --Found incidentally to have a peripheral and ascitic fluid lymphocytosis while being evaluated for acute liver failure/SBP in January 2016.  He had peritoneal biopsies done and also liver biopsy. This revealed some granulomas and there was concern for mycobacterial infection, though this was not confirmed.   --Diagnostic bone marrow biopsy 1/25/2016. FISH 3/14/2015, 16.5% with interstitial deletion of [id number] locus. Normal signal patterns of 6q21, MYC, BRITTANY, centromere 12 and TP53 (17p13.1) with no evidence of t(11;14).  --3/3/2016 SIFE noted a small IgG lambda restricted band of unknown significance by immunofixation. SPEP showed no quantifiable M spike. Serial SPEPs through 6/1/2020 showed no M Olivier.   -3/10/2016 Bone Marrow biopsy revealing for normocellularity (30%) with <1% blasts. Rare interstitial aggreegates with a predominance of B lymphocytes comprising less than 5% of the marrow cellularity. No granulomas or high-grade lymphoma seen. Immunophenotyping on bone marrow aspirate shows a kappa light chain restricted, CD5 and CD23 positive B cell population with dim, heterogenous CD20 expression.   --Flow cytometry 1/28/2016 of peripheral blood revealed 21% CD5 Kappa monotypic B cells, with a WBC of 16.5.   --IgHV mutation status (6/5/2019) Mutated IgHV identified, level of mutation 9.5%, allele 4-34*02  --Peripheral blood smear 12/14/2020 revealed stable thrombocytopenia,  lymphocyte morphology consistent with known CLL.   --Stable mild thrombocytopenia observed since at least 2016, improving over time. No evidence of anemia.   --ALC 4-9K/uL throughout 2016, increased gradually to 14K-17K/uL by 2021.   --No evidence of hypogammaglobulinemia on serial lab checks     HCC:  --He is followed by Dr. Willard (GI) and Barbra (Surgery) for consideration of liver transplant. He is followed by Dr. Lai with Interventional Radiology. Dr. Willard requested a repeat MRI in September 2021 which revealed a 3.1 cm heterogeneously arterially enhancing lesion in the central hepatic segment 4A/B with washout, LIRADS 5, and a subcentimetery arterially enhancing observation in hepatic segment 6, LIRADS 3. He underwent Y-90 embolization 12/14/2021. Follow-up MRI 1/26/2022 shows post Y-90 embolization changes     History of Present Illness:    I spoke with Mr. Martinez by telephone today due to difficulty establishing a video connection. He is feeling fatigued. His balance is diminished. He denies night sweats, fevers, or chills currently. No early satiety. Appetite and energy are stable. Denies any recent coughs, colds, or infectious symptoms. No N/V/D.     10-point ROS otherwise negative.      Past Medical History:     Past Medical History:   Diagnosis Date     Alcohol abuse     quit September 2015     Allergic rhinitis      Aortic calcification (H)      Asthma      Cancer (H)      Chronic hepatitis C with cirrhosis (H)      Diabetes mellitus, type II (H)      Diverticulosis      HTN (hypertension)      Portal hypertension (H)      SBP (spontaneous bacterial peritonitis) (H) Jan 2016     Splenomegaly             Past Surgical History:      Past Surgical History:   Procedure Laterality Date     ARTHROSCOPY SHOULDER  90s     CV CORONARY ANGIOGRAM N/A 1/21/2022    Procedure: CV CORONARY ANGIOGRAM;  Surgeon: Clint Cerda MD;  Location:  HEART CARDIAC CATH LAB     HC ESOPHAGOSCOPY,  DIAGNOSTIC  2015    small varices, portal HTN gastropathy in the stomach, otherwise normal     HERNIA REPAIR      inguinal     HERNIORRHAPHY UMBILICAL N/A 2016    Procedure: HERNIORRHAPHY UMBILICAL;  Surgeon: Yadira uLtz MD;  Location: [facility] OR     IR SIRT (SELECTIVE INTERNAL RADIO THERAPY)  2021     IR VISCERAL ANGIOGRAM  2021     IR VISCERAL EMBOLIZATION  2021     KNEE SURGERY  80s     LAPAROSCOPY DIAGNOSTIC (GENERAL) N/A 2016    Procedure: LAPAROSCOPY DIAGNOSTIC (GENERAL);  Surgeon: Jeannine Schneider MD;  Location: [facility] OR     laproscopic cholecystectomy  2015    liver biopsy performed at the same time            Social History:   Social history:  He is .  He lives in [place].  Stopped drinking in 2016. Before that, he was drinking between 2-4 alcoholic beverages a day  Nonsmoker. Slight cigar use.    Employment:  Retired.  He works for [employer].  He had beryllium exposure     Family History:   Family history:  His parents are .   His mother apparently  of complications from breast cancer.  His sister apparently had a ovarian cancer diagnosis.  His mother  at an early age-and he does not know specifics.  He is unable to tell me whether his sister and his mother pursued testing for BRCA1/2 mutations  No known BRCA1/2 testing done in the past. There is no male history of breast cancer to his knowledge.     Medications:       Current Outpatient Medications   Medication Sig     aMILoride (MIDAMOR) 5 MG tablet Take 2 tablets (10 mg) by mouth daily     Bioflavonoid Products (VITAMIN C) CHEW      cetirizine (ZYRTEC) 10 MG tablet Take 10 mg by mouth Reported on 3/6/2017 (Patient not taking: Reported on 2022)     fluticasone (FLONASE) 50 MCG/ACT nasal spray 2 sprays Reported on 3/6/2017 (Patient not taking: Reported on 2022)     folic acid (FOLVITE) 1 MG tablet 1 mg daily     metFORMIN (GLUCOPHAGE) 1000 MG tablet Take 1,000  mg by mouth daily (with dinner)      methotrexate 2.5 MG tablet 2.5 mg once a week 6 tablets once per week     methylPREDNISolone (MEDROL DOSEPAK) 4 MG tablet therapy pack Take as directed on package.     ondansetron (ZOFRAN) 4 MG tablet Take 1-2 tablets (4-8 mg) by mouth every 6 hours as needed for nausea (vomiting)     pantoprazole (PROTONIX) 40 MG EC tablet Take 1 tablet (40 mg) by mouth daily     Pseudoephedrine-APAP  MG TABS Take 300 mg by mouth daily      rifaximin (XIFAXAN) 550 MG TABS tablet Take 1 tablet (550 mg) by mouth 2 times daily     tiZANidine (ZANAFLEX) 4 MG tablet Take 2-8 mg by mouth     zinc sulfate (ZINCATE) 220 MG capsule Take 50 mg by mouth daily     No current facility-administered medications for this visit.               Physical Exam:   There were no vitals taken for this visit.    Physical exam deferred for telephone visit.   Data:      I personally reviewed the following studies:                    Recent Labs   Lab Test 01/26/22  1340 01/21/22  0857 12/13/21  0733 11/30/21  0752 09/16/21  0701 08/25/16  0629 08/24/16  0735 08/23/16  0651 08/22/16  0345 08/21/16  0359 08/20/16  0419 08/19/16  0510   WBC 16.3* 13.5* 18.7* 18.6* 21.9*   < > 14.6*   < > 9.8 11.2* 12.8* 14.8*   NEUTROPHIL  --   --   --   --   --   --  49.4  --  49.1 48.0 45.9 56.0   HGB 15.3 14.7 14.7 15.5 15.4   < > 9.7*   < > 10.1* 10.6* 11.1* 11.6*   * 88* 95* 99* 93*   < > 84*   < > 83* 75* 67* 52*    < > = values in this interval not displayed.              Recent Labs   Lab Test 01/26/22  1340 01/21/22  0857 12/13/21  0733 11/30/21  0752 09/16/21  0701    142 145* 143 145*   POTASSIUM 4.0 3.8 4.0 4.4 4.1   CHLORIDE 110* 112* 113* 110* 111*   CO2 23 24 23 26 26   ANIONGAP 8 6 9 7 8   BUN 8 9 8 10 7   CR 0.63* 0.60* 0.61* 0.74 0.68   JAMES 9.1 9.1 9.1 9.1 9.0             Recent Labs   Lab Test 11/30/21  0752 09/03/16  1041 09/02/16  0536 09/01/16  0636   MAG  --  2.0 2.0 2.0   PHOS 3.4 2.5 2.3* 2.2*             Recent Labs   Lab Test 01/26/22  1340 01/21/22  0857 12/14/21  0712   BILITOTAL 2.1* 1.8* 1.9*   ALKPHOS 233* 207* 176*   ALT 47 37 36   AST 56* 51* 54*   ALBUMIN 3.4 3.0* 3.0*         Peritoneal Bx  Specimen #: [id number]  Collected: 1/28/2016  \SPECIMEN(S):  A: Omentum, mass  B: Right lobe mass  C: Peritoneum biopsy #1  D: Peritoneum biopsy #2  E: Peritoneum biopsy #3  F: Peritoneum biopsy #4  G: Peritoneum biopsy #7    FINAL DIAGNOSIS:  A: Omental mass, biopsy:  - Mesothelial lined fibrous connective tissue with acute and chronicinflammation and reactive mesothelial cells changes  - No evidence of malignancy or granuloma    B: Liver, right lobe mass, biopsy:  - Caseating granuloma with acid fast structures suspicious for mycobacterium organisms (see comment)    C: Peritoneum #1, biopsy:  - Fibrous connective tissue with focal mesothelial lining  - No evidence of malignancy or granuloma    D: Peritoneum #2, biopsy:  - Specimen did not survive processing    E: Peritoneum #3, biopsy:  - Fibrous connective tissue with mesothelial lining  - No evidence of malignancy or granuloma    F: Peritoneum #4, biopsy:  - Mesothelial lined fibrous connective tissue with focal chronic inflammation  - No evidence of malignancy or granuloma    G: Peritoneum #7, biopsy:  - Mesothelial lined fibrous connective tissue with focal chronicinflammation  - No evidence of malignancy or granuloma    COMMENT:  AFB stain and immunohistochemistry for mycobacterium was performed on block B1 with appropriate controls. Both stains highlight structures  that are suspicious for mycobacterial organisms. GMS stain is negativefor fungal organisms.     Flow  Specimen #: [id number]  Collected: 1/28/2016 07:56    INTERPRETATION:  Blood:  CD5 positive kappa-monotypic B cells (21%), see comment  No aberrant immunophenotype on T cells    COMMENT:  A monoclonal process with the immunophenotype of chronic lymphocytic leukemia/small lymphocytic lymphoma  (CLL/SLL) is present. Distinction  between a monoclonal B cell lymphocytosis and CLL depends on the white blood cell count. Based on the reported WBC of 16.5, a diagnosis of  monoclonal B cell lymphocytosis is favored. Further information on monoclonal B-cell lymphocytosis is reviewed in Gilberto & Franko.  Blood. 2015;126(4):454-462.       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 Concern for mycobacterial infection.   Final Pathologic Diagnosis   A-C. Bone marrow aspirate, clot section and trephine core biopsy:   - Small monoclonal CD5/ CD23 positive B-cell population present.   - Approximately normocellular bone marrow biopsy for age (overall 30% cellular) with less than 1% blasts.   - Rare interstitial lymphocytic aggregates with a predominance of B lymphocytes comprising less than 5% of the marrow cellularity.   - No granulomas or high-grade lymphoma seen.   - (See comment).   D. Peripheral blood, morphology:   - Slight lymphocytosis.   - No circulating blasts, dysplastic cells or circulating large cell lymphoma seen.   Comment   An immunophenotyping study on marrow aspirate shows involvement by a kappa light chain restricted, CD5 and CD23 positive B cell population with dim, heterogenous CD20 expression.   The differential diagnosis includes bone marrow involvement by monoclonal B cell lymphocytosis versus slight (less than 5%) marrow involvement by small lymphocytic lymphoma elsewhere.      No results found for this or any previous visit (from the past 24 hour(s)).     Assessment and Plan:      1. 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 Hepatocellular carcinoma   Mr. Martinez is a 66 year old man with a history of chronic lymphocytic leukemia, incidentally discovered in 2016.  His diagnostic biopsy results suggest low risk disease, and he is CERVANTES stage 0.  He has never required treatment.  His absolute  lymphocyte count doubling time remains greater than 1 year.  In speaking with him today, he is having no symptoms that would suggest that therapy is warranted.  He does have a strong family history of malignancy.  He was offered genetic counseling in 2017, but declined.  His family history includes breast and ovarian cancer, raising the possibility that he may harbor germline BRCA mutations -- however there is no definitive evidence of this as he declined genetic testing in the past.  His primary hematologist is Dr. Marlo Hoang.     Regarding his prognosis and risk stratification -- his lack of B-symptoms, baseline molecular/cytogenetic features, and indolent course thus far, all imply low risk disease. Cervantes stage 0 disease was initially associated with median overall survival times of >10 years from the time of diagnosis -- however, this association was observed before the Gnosticism of novel immunochemotherapy regimens, which have further greatly extended survival. Balanced against this, there exists a ~1% annual risk of transformation to a more aggressive lymphoma; and I would estimate a >50% possibility that his CLL may progress to the point of requiring treatment in the next decade. Altogether however, it is likely that his CLL will not otherwise affect his projected life expectancy.   There appears to be limited evidence to suggest that organ transplantation meaningfully affects subsequent survival in patients who harbor pre-existing cancer. Large registry studies suggest a modest general increase in subsequent mortality, but perhaps slightly decreased risk in NHL-specific mortality (Cancer Epidemiol Biomarkers Prev. 2021 Jul; 30(7): 1553-1182.)   Therefore, I would recommend that Mr. Duckworths CLL not be considered a barrier to liver transplantation  He will continue to follow with Dr. Hoang for now. If he requires further management at [facility] in the future, I will be happy to be involved in his care.          Buddy  LEEROY Wolfe MD, PhD  Instructor, Division of Hematology, Oncology, and Transplantation  [facility]  p [phone]

## 2022-02-11 NOTE — LETTER
2/11/2022         RE: Mehdi Martinez  [address] [facility] [address]        Dear Colleague,    Thank you for referring your patient, Mehdi Martinez, to the [facility]. Please see a copy of my visit note below.    Mehdi Martinez  is being evaluated via a billable video visit.      How would you like to obtain your AVS? Mail a copy  For the video visit, send the invitation by: Text to cell phone: [phone]  Will anyone else be joining your video visit? No      [facility]  New patient history and physical  February 9, 2022  DIAGNOSES:  1. 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 Hepatocellular carcinoma   TREATMENT:  1. CLL: No treatment to date  2. HCC: Y-90 embolization 12/14/2021     Hem/onc History:   Diagnosis and Staging:  CLL:  --Found incidentally to have a peripheral and ascitic fluid lymphocytosis while being evaluated for acute liver failure/SBP in January 2016.  He had peritoneal biopsies done and also liver biopsy. This revealed some granulomas and there was concern for mycobacterial infection, though this was not confirmed.   --Diagnostic bone marrow biopsy 1/25/2016. FISH 3/14/2015, 16.5% with interstitial deletion of [id number] locus. Normal signal patterns of 6q21, MYC, BRITTANY, centromere 12 and TP53 (17p13.1) with no evidence of t(11;14).  --3/3/2016 SIFE noted a small IgG lambda restricted band of unknown significance by immunofixation. SPEP showed no quantifiable M spike. Serial SPEPs through 6/1/2020 showed no M Olivier.   -3/10/2016 Bone Marrow biopsy revealing for normocellularity (30%) with <1% blasts. Rare interstitial aggreegates with a predominance of B lymphocytes comprising less than 5% of the marrow cellularity. No granulomas or high-grade lymphoma seen. Immunophenotyping on bone marrow aspirate shows a kappa light chain restricted, CD5 and CD23 positive B cell population with dim, heterogenous CD20 expression.   --Flow cytometry 1/28/2016  of peripheral blood revealed 21% CD5 Kappa monotypic B cells, with a WBC of 16.5.   --IgHV mutation status (6/5/2019) Mutated IgHV identified, level of mutation 9.5%, allele 4-34*02  --Peripheral blood smear 12/14/2020 revealed stable thrombocytopenia, lymphocyte morphology consistent with known CLL.   --Stable mild thrombocytopenia observed since at least 2016, improving over time. No evidence of anemia.   --ALC 4-9K/uL throughout 2016, increased gradually to 14K-17K/uL by 2021.   --No evidence of hypogammaglobulinemia on serial lab checks     HCC:  --He is followed by Dr. Willard (GI) and Barbra (Surgery) for consideration of liver transplant. He is followed by Dr. Lai with Interventional Radiology. Dr. Willard requested a repeat MRI in September 2021 which revealed a 3.1 cm heterogeneously arterially enhancing lesion in the central hepatic segment 4A/B with washout, LIRADS 5, and a subcentimetery arterially enhancing observation in hepatic segment 6, LIRADS 3. He underwent Y-90 embolization 12/14/2021. Follow-up MRI 1/26/2022 shows post Y-90 embolization changes     History of Present Illness:    I spoke with Mr. Martinez by telephone today due to difficulty establishing a video connection. He is feeling fatigued. His balance is diminished. He denies night sweats, fevers, or chills currently. No early satiety. Appetite and energy are stable. Denies any recent coughs, colds, or infectious symptoms. No N/V/D.     10-point ROS otherwise negative.      Past Medical History:     Past Medical History:   Diagnosis Date     Alcohol abuse     quit September 2015     Allergic rhinitis      Aortic calcification (H)      Asthma      Cancer (H)      Chronic hepatitis C with cirrhosis (H)      Diabetes mellitus, type II (H)      Diverticulosis      HTN (hypertension)      Portal hypertension (H)      SBP (spontaneous bacterial peritonitis) (H) Jan 2016     Splenomegaly             Past Surgical History:      Past Surgical  History:   Procedure Laterality Date     ARTHROSCOPY SHOULDER  90s     CV CORONARY ANGIOGRAM N/A 2022    Procedure: CV CORONARY ANGIOGRAM;  Surgeon: Clint Cerda MD;  Location:  [facility]     HC ESOPHAGOSCOPY, DIAGNOSTIC  2015    small varices, portal HTN gastropathy in the stomach, otherwise normal     HERNIA REPAIR      inguinal     HERNIORRHAPHY UMBILICAL N/A 2016    Procedure: HERNIORRHAPHY UMBILICAL;  Surgeon: Yadira Lutz MD;  Location: [facility] OR     IR SIRT (SELECTIVE INTERNAL RADIO THERAPY)  2021     IR VISCERAL ANGIOGRAM  2021     IR VISCERAL EMBOLIZATION  2021     KNEE SURGERY  80s     LAPAROSCOPY DIAGNOSTIC (GENERAL) N/A 2016    Procedure: LAPAROSCOPY DIAGNOSTIC (GENERAL);  Surgeon: Jeannine Schneider MD;  Location: [facility] OR     laproscopic cholecystectomy  2015    liver biopsy performed at the same time            Social History:   Social history:  He is .  He lives in [place].  Stopped drinking in 2016. Before that, he was drinking between 2-4 alcoholic beverages a day  Nonsmoker. Slight cigar use.    Employment:  Retired.  He works for [employer].  He had beryllium exposure     Family History:   Family history:  His parents are .   His mother apparently  of complications from breast cancer.  His sister apparently had a ovarian cancer diagnosis.  His mother  at an early age-and he does not know specifics.  He is unable to tell me whether his sister and his mother pursued testing for BRCA1/2 mutations  No known BRCA1/2 testing done in the past. There is no male history of breast cancer to his knowledge.     Medications:       Current Outpatient Medications   Medication Sig     aMILoride (MIDAMOR) 5 MG tablet Take 2 tablets (10 mg) by mouth daily     Bioflavonoid Products (VITAMIN C) CHEW      cetirizine (ZYRTEC) 10 MG tablet Take 10 mg by mouth Reported on 3/6/2017 (Patient not  taking: Reported on 2/2/2022)     fluticasone (FLONASE) 50 MCG/ACT nasal spray 2 sprays Reported on 3/6/2017 (Patient not taking: Reported on 2/2/2022)     folic acid (FOLVITE) 1 MG tablet 1 mg daily     metFORMIN (GLUCOPHAGE) 1000 MG tablet Take 1,000 mg by mouth daily (with dinner)      methotrexate 2.5 MG tablet 2.5 mg once a week 6 tablets once per week     methylPREDNISolone (MEDROL DOSEPAK) 4 MG tablet therapy pack Take as directed on package.     ondansetron (ZOFRAN) 4 MG tablet Take 1-2 tablets (4-8 mg) by mouth every 6 hours as needed for nausea (vomiting)     pantoprazole (PROTONIX) 40 MG EC tablet Take 1 tablet (40 mg) by mouth daily     Pseudoephedrine-APAP  MG TABS Take 300 mg by mouth daily      rifaximin (XIFAXAN) 550 MG TABS tablet Take 1 tablet (550 mg) by mouth 2 times daily     tiZANidine (ZANAFLEX) 4 MG tablet Take 2-8 mg by mouth     zinc sulfate (ZINCATE) 220 MG capsule Take 50 mg by mouth daily     No current facility-administered medications for this visit.               Physical Exam:   There were no vitals taken for this visit.    Physical exam deferred for telephone visit.   Data:      I personally reviewed the following studies:                    Recent Labs   Lab Test 01/26/22  1340 01/21/22  0857 12/13/21  0733 11/30/21  0752 09/16/21  0701 08/25/16  0629 08/24/16  0735 08/23/16  0651 08/22/16  0345 08/21/16  0359 08/20/16  0419 08/19/16  0510   WBC 16.3* 13.5* 18.7* 18.6* 21.9*   < > 14.6*   < > 9.8 11.2* 12.8* 14.8*   NEUTROPHIL  --   --   --   --   --   --  49.4  --  49.1 48.0 45.9 56.0   HGB 15.3 14.7 14.7 15.5 15.4   < > 9.7*   < > 10.1* 10.6* 11.1* 11.6*   * 88* 95* 99* 93*   < > 84*   < > 83* 75* 67* 52*    < > = values in this interval not displayed.              Recent Labs   Lab Test 01/26/22  1340 01/21/22  0857 12/13/21  0733 11/30/21  0752 09/16/21  0701    142 145* 143 145*   POTASSIUM 4.0 3.8 4.0 4.4 4.1   CHLORIDE 110* 112* 113* 110* 111*   CO2 23 24  23 26 26   ANIONGAP 8 6 9 7 8   BUN 8 9 8 10 7   CR 0.63* 0.60* 0.61* 0.74 0.68   JAMES 9.1 9.1 9.1 9.1 9.0             Recent Labs   Lab Test 11/30/21  0752 09/03/16  1041 09/02/16  0536 09/01/16  0636   MAG  --  2.0 2.0 2.0   PHOS 3.4 2.5 2.3* 2.2*            Recent Labs   Lab Test 01/26/22  1340 01/21/22  0857 12/14/21  0712   BILITOTAL 2.1* 1.8* 1.9*   ALKPHOS 233* 207* 176*   ALT 47 37 36   AST 56* 51* 54*   ALBUMIN 3.4 3.0* 3.0*         Peritoneal Bx  Specimen #: [id number]  Collected: 1/28/2016  \SPECIMEN(S):  A: Omentum, mass  B: Right lobe mass  C: Peritoneum biopsy #1  D: Peritoneum biopsy #2  E: Peritoneum biopsy #3  F: Peritoneum biopsy #4  G: Peritoneum biopsy #7    FINAL DIAGNOSIS:  A: Omental mass, biopsy:  - Mesothelial lined fibrous connective tissue with acute and chronicinflammation and reactive mesothelial cells changes  - No evidence of malignancy or granuloma    B: Liver, right lobe mass, biopsy:  - Caseating granuloma with acid fast structures suspicious for mycobacterium organisms (see comment)    C: Peritoneum #1, biopsy:  - Fibrous connective tissue with focal mesothelial lining  - No evidence of malignancy or granuloma    D: Peritoneum #2, biopsy:  - Specimen did not survive processing    E: Peritoneum #3, biopsy:  - Fibrous connective tissue with mesothelial lining  - No evidence of malignancy or granuloma    F: Peritoneum #4, biopsy:  - Mesothelial lined fibrous connective tissue with focal chronic inflammation  - No evidence of malignancy or granuloma    G: Peritoneum #7, biopsy:  - Mesothelial lined fibrous connective tissue with focal chronicinflammation  - No evidence of malignancy or granuloma    COMMENT:  AFB stain and immunohistochemistry for mycobacterium was performed on block B1 with appropriate controls. Both stains highlight structures  that are suspicious for mycobacterial organisms. GMS stain is negativefor fungal organisms.     Flow  Specimen #: [id number]  Collected: 1/28/2016  07:56    INTERPRETATION:  Blood:  CD5 positive kappa-monotypic B cells (21%), see comment  No aberrant immunophenotype on T cells    COMMENT:  A monoclonal process with the immunophenotype of chronic lymphocytic leukemia/small lymphocytic lymphoma (CLL/SLL) is present. Distinction  between a monoclonal B cell lymphocytosis and CLL depends on the white blood cell count. Based on the reported WBC of 16.5, a diagnosis of  monoclonal B cell lymphocytosis is favored. Further information on monoclonal B-cell lymphocytosis is reviewed in Gilberto & Franko.  Blood. 2015;126(4):454-462.       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 Concern for mycobacterial infection.   Final Pathologic Diagnosis   A-C. Bone marrow aspirate, clot section and trephine core biopsy:   - Small monoclonal CD5/ CD23 positive B-cell population present.   - Approximately normocellular bone marrow biopsy for age (overall 30% cellular) with less than 1% blasts.   - Rare interstitial lymphocytic aggregates with a predominance of B lymphocytes comprising less than 5% of the marrow cellularity.   - No granulomas or high-grade lymphoma seen.   - (See comment).   D. Peripheral blood, morphology:   - Slight lymphocytosis.   - No circulating blasts, dysplastic cells or circulating large cell lymphoma seen.   Comment   An immunophenotyping study on marrow aspirate shows involvement by a kappa light chain restricted, CD5 and CD23 positive B cell population with dim, heterogenous CD20 expression.   The differential diagnosis includes bone marrow involvement by monoclonal B cell lymphocytosis versus slight (less than 5%) marrow involvement by small lymphocytic lymphoma elsewhere.      No results found for this or any previous visit (from the past 24 hour(s)).     Assessment and Plan:      1. 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 Hepatocellular  carcinoma   Mr. Martinez is a 66 year old man with a history of chronic lymphocytic leukemia, incidentally discovered in 2016.  His diagnostic biopsy results suggest low risk disease, and he is CERVANTES stage 0.  He has never required treatment.  His absolute lymphocyte count doubling time remains greater than 1 year.  In speaking with him today, he is having no symptoms that would suggest that therapy is warranted.  He does have a strong family history of malignancy.  He was offered genetic counseling in 2017, but declined.  His family history includes breast and ovarian cancer, raising the possibility that he may harbor germline BRCA mutations -- however there is no definitive evidence of this as he declined genetic testing in the past.  His primary hematologist is Dr. Marlo Hoang.     Regarding his prognosis and risk stratification -- his lack of B-symptoms, baseline molecular/cytogenetic features, and indolent course thus far, all imply low risk disease. Cervantes stage 0 disease was initially associated with median overall survival times of >10 years from the time of diagnosis -- however, this association was observed before the Gnosticist of novel immunochemotherapy regimens, which have further greatly extended survival. Balanced against this, there exists a ~1% annual risk of transformation to a more aggressive lymphoma; and I would estimate a >50% possibility that his CLL may progress to the point of requiring treatment in the next decade. Altogether however, it is likely that his CLL will not otherwise affect his projected life expectancy.   There appears to be limited evidence to suggest that organ transplantation meaningfully affects subsequent survival in patients who harbor pre-existing cancer. Large registry studies suggest a modest general increase in subsequent mortality, but perhaps slightly decreased risk in NHL-specific mortality (Cancer Epidemiol Biomarkers Prev. 2021 Jul; 30(7): 8751-6643.)   Therefore, I would  recommend that Mr. Martinez's CLL not be considered a barrier to liver transplantation  He will continue to follow with Dr. Hoang for now. If he requires further management at [facility] in the future, I will be happy to be involved in his care.          Buddy Wolfe MD, PhD  Instructor, Division of Hematology, Oncology, and Transplantation  [facility]  p [phone]        Again, thank you for allowing me to participate in the care of your patient.        Sincerely,        Buddy Wolfe MD

## 2022-03-15 ENCOUNTER — TRANSFERRED RECORDS (OUTPATIENT)
Dept: HEALTH INFORMATION MANAGEMENT | Facility: CLINIC | Age: 67
End: 2022-03-15
Payer: COMMERCIAL

## 2022-03-17 LAB — HBA1C MFR BLD: 8.5 %

## 2022-03-21 ENCOUNTER — TRANSFERRED RECORDS (OUTPATIENT)
Dept: HEALTH INFORMATION MANAGEMENT | Facility: CLINIC | Age: 67
End: 2022-03-21
Payer: COMMERCIAL

## 2022-03-22 LAB
CREATININE (EXTERNAL): 0.56 MG/DL (ref 0.73–1.18)
GFR ESTIMATED (EXTERNAL): >60 ML/MIN/1.73M2
GLUCOSE (EXTERNAL): 132 MG/DL (ref 70–100)
POTASSIUM (EXTERNAL): 4.2 MMOL/L (ref 3.5–5.1)

## 2022-03-30 ENCOUNTER — TELEPHONE (OUTPATIENT)
Dept: TRANSPLANT | Facility: CLINIC | Age: 67
End: 2022-03-30
Payer: COMMERCIAL
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
## 2022-03-31 LAB
ALT SERPL-CCNC: 45 U/L (ref 0–55)
AST SERPL-CCNC: 73 U/L (ref 10–40)
INR (EXTERNAL): 1.2 (ref 0.9–1.1)
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
## 2022-04-05 ENCOUNTER — COMMITTEE REVIEW (OUTPATIENT)
Dept: TRANSPLANT | Facility: CLINIC | Age: 67
End: 2022-04-05
Payer: COMMERCIAL

## 2022-04-05 ENCOUNTER — TELEPHONE (OUTPATIENT)
Dept: TRANSPLANT | Facility: CLINIC | Age: 67
End: 2022-04-05
Payer: COMMERCIAL

## 2022-04-05 DIAGNOSIS — C22.0 HCC (HEPATOCELLULAR CARCINOMA) (H): Primary | ICD-10-CM

## 2022-04-05 NOTE — TELEPHONE ENCOUNTER
Voice mail received that patient out of hospital at [facility]    Will have Dr. Willard/lab/MRI appts rescheduled.  Transplant listing on hold til seen.
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
## 2022-04-11 ENCOUNTER — TELEPHONE (OUTPATIENT)
Dept: VASCULAR SURGERY | Facility: CLINIC | Age: 67
End: 2022-04-11
Payer: COMMERCIAL

## 2022-04-11 DIAGNOSIS — C22.0 HCC (HEPATOCELLULAR CARCINOMA) (H): Primary | ICD-10-CM

## 2022-04-11 NOTE — TELEPHONE ENCOUNTER
Called and LVM for Pt noting that we will need to cancel his appt with Dr Lai for this Wednesday as MRI needed prior was canceled.     Called and spoke with Pt wife, Emmie, and discussed the above information.  Noted scheduling will contact her and discuss availability.  Emmie verbalized understanding, agreed to current plan and denied any further questions.    Mavis Wood LPN       Implemented All Universal Safety Interventions:  Geraldine to call system. Call bell, personal items and telephone within reach. Instruct patient to call for assistance. Room bathroom lighting operational. Non-slip footwear when patient is off stretcher. Physically safe environment: no spills, clutter or unnecessary equipment. Stretcher in lowest position, wheels locked, appropriate side rails in place.

## 2022-04-11 NOTE — TELEPHONE ENCOUNTER
Date: 4/11/2022    Time of Call: 10:39 AM     Diagnosis:  Hepatocellular carcinoma, status post Y90  12/14/2021     [ VORB ] Ordering provider: Dr Lai  Order:  - MR Abdomen w/o & w Contrast    - AFP   - Hepatic Panel    Order received by: Mavis Wood LPN     Follow-up/additional notes: Routed to CC pool.

## 2022-04-18 ENCOUNTER — TELEPHONE (OUTPATIENT)
Dept: TRANSPLANT | Facility: CLINIC | Age: 67
End: 2022-04-18
Payer: COMMERCIAL

## 2022-04-18 DIAGNOSIS — C22.0 HCC (HEPATOCELLULAR CARCINOMA) (H): ICD-10-CM

## 2022-04-18 DIAGNOSIS — K70.30 ALCOHOLIC CIRRHOSIS (H): Primary | ICD-10-CM
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
## 2022-04-19 ENCOUNTER — HOSPITAL ENCOUNTER (OUTPATIENT)
Dept: MRI IMAGING | Facility: CLINIC | Age: 67
Discharge: HOME | End: 2022-04-19
Attending: INTERNAL MEDICINE
Payer: COMMERCIAL

## 2022-04-19 ENCOUNTER — OFFICE VISIT (OUTPATIENT)
Dept: GASTROENTEROLOGY | Facility: CLINIC | Age: 67
End: 2022-04-19
Attending: INTERNAL MEDICINE
Payer: COMMERCIAL

## 2022-04-19 ENCOUNTER — LAB (OUTPATIENT)
Dept: LAB | Facility: CLINIC | Age: 67
End: 2022-04-19
Payer: COMMERCIAL

## 2022-04-19 VITALS
SYSTOLIC BLOOD PRESSURE: 148 MMHG | OXYGEN SATURATION: 97 % | DIASTOLIC BLOOD PRESSURE: 91 MMHG | BODY MASS INDEX: 26.26 KG/M2 | WEIGHT: 183 LBS | HEART RATE: 111 BPM

## 2022-04-19 DIAGNOSIS — K70.30 ALCOHOLIC CIRRHOSIS (H): ICD-10-CM

## 2022-04-19 DIAGNOSIS — C22.0 HCC (HEPATOCELLULAR CARCINOMA) (H): ICD-10-CM

## 2022-04-19 DIAGNOSIS — C22.0 HCC (HEPATOCELLULAR CARCINOMA) (H): Primary | ICD-10-CM

## 2022-04-19 DIAGNOSIS — C91.10 CLL (CHRONIC LYMPHOCYTIC LEUKEMIA) (H): ICD-10-CM

## 2022-04-19 LAB
AFP SERPL-MCNC: 5.7 UG/L (ref 0–8)
ALBUMIN SERPL-MCNC: 3.2 G/DL (ref 3.4–5)
ALP SERPL-CCNC: 330 U/L (ref 40–150)
ALT SERPL W P-5'-P-CCNC: 41 U/L (ref 0–70)
ANION GAP SERPL CALCULATED.3IONS-SCNC: 8 MMOL/L (ref 3–14)
AST SERPL W P-5'-P-CCNC: 66 U/L (ref 0–45)
BASOPHILS # BLD MANUAL: 0 10E3/UL (ref 0–0.2)
BASOPHILS NFR BLD MANUAL: 0 %
BILIRUB DIRECT SERPL-MCNC: 1 MG/DL (ref 0–0.2)
BILIRUB SERPL-MCNC: 2.1 MG/DL (ref 0.2–1.3)
BUN SERPL-MCNC: 5 MG/DL (ref 7–30)
BURR CELLS BLD QL SMEAR: SLIGHT
CALCIUM SERPL-MCNC: 9.5 MG/DL (ref 8.5–10.1)
CHLORIDE BLD-SCNC: 105 MMOL/L (ref 94–109)
CO2 SERPL-SCNC: 27 MMOL/L (ref 20–32)
CREAT SERPL-MCNC: 0.55 MG/DL (ref 0.66–1.25)
EOSINOPHIL # BLD MANUAL: 0.4 10E3/UL (ref 0–0.7)
EOSINOPHIL NFR BLD MANUAL: 3 %
ERYTHROCYTE [DISTWIDTH] IN BLOOD BY AUTOMATED COUNT: 16.2 % (ref 10–15)
GFR SERPL CREATININE-BSD FRML MDRD: >90 ML/MIN/1.73M2
GLUCOSE BLD-MCNC: 195 MG/DL (ref 70–99)
HCT VFR BLD AUTO: 45.9 % (ref 40–53)
HGB BLD-MCNC: 15 G/DL (ref 13.3–17.7)
INR PPP: 1.17 (ref 0.85–1.15)
LYMPHOCYTES # BLD MANUAL: 8.8 10E3/UL (ref 0.8–5.3)
LYMPHOCYTES NFR BLD MANUAL: 72 %
MCH RBC QN AUTO: 30.3 PG (ref 26.5–33)
MCHC RBC AUTO-ENTMCNC: 32.7 G/DL (ref 31.5–36.5)
MCV RBC AUTO: 93 FL (ref 78–100)
MONOCYTES # BLD MANUAL: 0.5 10E3/UL (ref 0–1.3)
MONOCYTES NFR BLD MANUAL: 4 %
NEUTROPHILS # BLD MANUAL: 2.6 10E3/UL (ref 1.6–8.3)
NEUTROPHILS NFR BLD MANUAL: 21 %
PLAT MORPH BLD: ABNORMAL
PLATELET # BLD AUTO: 107 10E3/UL (ref 150–450)
POTASSIUM BLD-SCNC: 3.8 MMOL/L (ref 3.4–5.3)
PROT SERPL-MCNC: 6.4 G/DL (ref 6.8–8.8)
RBC # BLD AUTO: 4.95 10E6/UL (ref 4.4–5.9)
RBC MORPH BLD: ABNORMAL
SODIUM SERPL-SCNC: 140 MMOL/L (ref 133–144)
WBC # BLD AUTO: 12.2 10E3/UL (ref 4–11)

## 2022-04-19 PROCEDURE — 255N000002 HC RX 255 OP 636: Performed by: INTERNAL MEDICINE

## 2022-04-19 PROCEDURE — 80053 COMPREHEN METABOLIC PANEL: CPT | Performed by: PATHOLOGY

## 2022-04-19 PROCEDURE — 99000 SPECIMEN HANDLING OFFICE-LAB: CPT | Performed by: PATHOLOGY

## 2022-04-19 PROCEDURE — 74183 MRI ABD W/O CNTR FLWD CNTR: CPT | Mod: 26 | Performed by: RADIOLOGY

## 2022-04-19 PROCEDURE — 80321 ALCOHOLS BIOMARKERS 1OR 2: CPT | Mod: 90 | Performed by: PATHOLOGY

## 2022-04-19 PROCEDURE — 82105 ALPHA-FETOPROTEIN SERUM: CPT | Performed by: INTERNAL MEDICINE

## 2022-04-19 PROCEDURE — 85027 COMPLETE CBC AUTOMATED: CPT | Performed by: PATHOLOGY

## 2022-04-19 PROCEDURE — A9585 GADOBUTROL INJECTION: HCPCS | Performed by: INTERNAL MEDICINE

## 2022-04-19 PROCEDURE — 82248 BILIRUBIN DIRECT: CPT | Performed by: PATHOLOGY

## 2022-04-19 PROCEDURE — 74183 MRI ABD W/O CNTR FLWD CNTR: CPT

## 2022-04-19 PROCEDURE — G0463 HOSPITAL OUTPT CLINIC VISIT: HCPCS | Mod: 25 | Performed by: INTERNAL MEDICINE

## 2022-04-19 PROCEDURE — 99215 OFFICE O/P EST HI 40 MIN: CPT | Performed by: INTERNAL MEDICINE

## 2022-04-19 PROCEDURE — 36415 COLL VENOUS BLD VENIPUNCTURE: CPT | Performed by: PATHOLOGY

## 2022-04-19 PROCEDURE — 85610 PROTHROMBIN TIME: CPT | Performed by: PATHOLOGY

## 2022-04-19 RX ORDER — GADOBUTROL 604.72 MG/ML
10 INJECTION INTRAVENOUS ONCE
Status: COMPLETED | OUTPATIENT
Start: 2022-04-19 | End: 2022-04-19

## 2022-04-19 RX ADMIN — GADOBUTROL 8 ML: 604.72 INJECTION INTRAVENOUS at 12:37

## 2022-04-19 NOTE — LETTER
4/19/2022     RE: Mehdi Martinez  [address] [facility]  [place] [address]    Dear Colleague,    Thank you for referring your patient, Mehdi Martinez, to the [facility] HEPATOLOGY CLINIC [place]. Please see a copy of my visit note below.    HISTORY OF PRESENT ILLNESS:  I had the pleasure of seeing Mehdi Martinez for a followup in the Liver Transplant Clinic at the [facility] on 04/19/2022.  Mr. Martinez returns for followup for cirrhosis caused by chronic hepatitis C and complicated by hepatocellular carcinoma.  He is status post liver-directed therapy.  His last MRI in January showed a good response.  He has been evaluated for liver transplantation and is currently on the wait list but without MELD exception points.    The major new event since he was last seen is he did take a serious fall while out walking.  He ultimately was brought to [facility] where he was found to have a C7-T1 acute spinal fracture.  He also has a comminuted fracture with displacement of his right scapula.  He had multiple head lacerations as well.  He eventually recovered in the hospital and then was set to a rehab facility and has been home now for about 1 week.    At present, he still is fairly frail and has some difficulty walking and was walking with only a cane.  He does complain of rather severe right shoulder pain.  He denies any abdominal pain.  He denies any itching or skin rash.  He still has fatigue.  He denies any increased abdominal girth or lower extremity edema.  He has not had any gastrointestinal bleeding and no current overt signs of hepatic encephalopathy.    He denies any fevers or chills or cough.  He does note some shortness of breath.  He denies any nausea or vomiting, diarrhea or constipation in spite of him being on narcotic pain medication for his scapular fracture.    He does report that his appetite is good and he believes his weight has been roughly the  same.    Current Outpatient Medications   Medication     aMILoride (MIDAMOR) 5 MG tablet     Bioflavonoid Products (VITAMIN C) CHEW     folic acid (FOLVITE) 1 MG tablet     metFORMIN (GLUCOPHAGE) 1000 MG tablet     methotrexate 2.5 MG tablet     methylPREDNISolone (MEDROL DOSEPAK) 4 MG tablet therapy pack     ondansetron (ZOFRAN) 4 MG tablet     pantoprazole (PROTONIX) 40 MG EC tablet     rifaximin (XIFAXAN) 550 MG TABS tablet     zinc sulfate (ZINCATE) 220 MG capsule     cetirizine (ZYRTEC) 10 MG tablet     fluticasone (FLONASE) 50 MCG/ACT nasal spray     Pseudoephedrine-APAP  MG TABS     tiZANidine (ZANAFLEX) 4 MG tablet     No current facility-administered medications for this visit.     BP (!) 148/91   Pulse 111   Wt 83 kg (183 lb)   SpO2 97%   BMI 26.26 kg/m      PHYSICAL EXAMINATION:    GENERAL:  He is in a large neck brace, but does appear in no acute distress.  HEENT:  No scleral icterus or temporal muscle wasting.  CHEST:  Clear.  ABDOMEN:  No obvious ascites.  No masses or tenderness to palpation is present.  His liver is 10 cm in span without left lobe enlargement.  No spleen tip is palpable.  EXTREMITIES:  No edema.  SKIN:  No stigmata of chronic liver disease.  NEUROLOGIC:  No asterixis.    Recent Results (from the past 168 hour(s))   Basic metabolic panel    Collection Time: 04/19/22  8:57 AM   Result Value Ref Range    Sodium 140 133 - 144 mmol/L    Potassium 3.8 3.4 - 5.3 mmol/L    Chloride 105 94 - 109 mmol/L    Carbon Dioxide (CO2) 27 20 - 32 mmol/L    Anion Gap 8 3 - 14 mmol/L    Urea Nitrogen 5 (L) 7 - 30 mg/dL    Creatinine 0.55 (L) 0.66 - 1.25 mg/dL    Calcium 9.5 8.5 - 10.1 mg/dL    Glucose 195 (H) 70 - 99 mg/dL    GFR Estimate >90 >60 mL/min/1.73m2   Hepatic panel    Collection Time: 04/19/22  8:57 AM   Result Value Ref Range    Bilirubin Total 2.1 (H) 0.2 - 1.3 mg/dL    Bilirubin Direct 1.0 (H) 0.0 - 0.2 mg/dL    Protein Total 6.4 (L) 6.8 - 8.8 g/dL    Albumin 3.2 (L) 3.4 - 5.0  g/dL    Alkaline Phosphatase 330 (H) 40 - 150 U/L    AST 66 (H) 0 - 45 U/L    ALT 41 0 - 70 U/L   INR    Collection Time: 04/19/22  8:57 AM   Result Value Ref Range    INR 1.17 (H) 0.85 - 1.15   CBC with platelets and differential    Collection Time: 04/19/22  8:57 AM   Result Value Ref Range    WBC Count 12.2 (H) 4.0 - 11.0 10e3/uL    RBC Count 4.95 4.40 - 5.90 10e6/uL    Hemoglobin 15.0 13.3 - 17.7 g/dL    Hematocrit 45.9 40.0 - 53.0 %    MCV 93 78 - 100 fL    MCH 30.3 26.5 - 33.0 pg    MCHC 32.7 31.5 - 36.5 g/dL    RDW 16.2 (H) 10.0 - 15.0 %    Platelet Count 107 (L) 150 - 450 10e3/uL      IMPRESSION:  Mr. Martinez has cirrhosis caused by chronic hepatitis C and complicated by hepatocellular carcinoma.  He will get a followup MRI this afternoon, which I will review.  He is currently listed for transplantation, but is inactive because of his injuries.  I will continue to keep him active, as he will accumulate waiting time points, and we will see him back in the clinic in 3 months in order to reassess whether we should fully reactivate him at that point or not.  He also will get an MRI at that point in time.    He does seem to have a lot of pain from his comminuted displaced fracture of the scapula, and I have recommended he see his orthopedic surgeon again in the next 6 weeks, and if he still is having this much pain, he should strongly consider surgical repair.  He is seeing the neurosurgeon within the next week about his spinal fracture.  He did have an ultrasound that shows his TIPS is working well, and he has no ascites.  I otherwise will not be making any other change to his medical regimen.  I will see him back in the clinic in 3 months.    Thank you very much for allowing me to participate in the care of this patient.  If you have any questions regarding my recommendations, please do not hesitate to contact me.         Juancho Willard MD      Professor of Medicine  [facility]  [employer]      Executive Medical Director, Solid Organ Transplant Program  [facility]

## 2022-04-19 NOTE — PROGRESS NOTES
HISTORY OF PRESENT ILLNESS:  I had the pleasure of seeing Mehdi Martinez for a followup in the Liver Transplant Clinic at the [facility] on 04/19/2022.  Mr. Martinez returns for followup for cirrhosis caused by chronic hepatitis C and complicated by hepatocellular carcinoma.  He is status post liver-directed therapy.  His last MRI in January showed a good response.  He has been evaluated for liver transplantation and is currently on the wait list but without MELD exception points.    The major new event since he was last seen is he did take a serious fall while out walking.  He ultimately was brought to [facility] where he was found to have a C7-T1 acute spinal fracture.  He also has a comminuted fracture with displacement of his right scapula.  He had multiple head lacerations as well.  He eventually recovered in the hospital and then was set to a rehab facility and has been home now for about 1 week.    At present, he still is fairly frail and has some difficulty walking and was walking with only a cane.  He does complain of rather severe right shoulder pain.  He denies any abdominal pain.  He denies any itching or skin rash.  He still has fatigue.  He denies any increased abdominal girth or lower extremity edema.  He has not had any gastrointestinal bleeding and no current overt signs of hepatic encephalopathy.    He denies any fevers or chills or cough.  He does note some shortness of breath.  He denies any nausea or vomiting, diarrhea or constipation in spite of him being on narcotic pain medication for his scapular fracture.    He does report that his appetite is good and he believes his weight has been roughly the same.    Current Outpatient Medications   Medication     aMILoride (MIDAMOR) 5 MG tablet     Bioflavonoid Products (VITAMIN C) CHEW     folic acid (FOLVITE) 1 MG tablet     metFORMIN (GLUCOPHAGE) 1000 MG tablet     methotrexate 2.5 MG tablet     methylPREDNISolone  (MEDROL DOSEPAK) 4 MG tablet therapy pack     ondansetron (ZOFRAN) 4 MG tablet     pantoprazole (PROTONIX) 40 MG EC tablet     rifaximin (XIFAXAN) 550 MG TABS tablet     zinc sulfate (ZINCATE) 220 MG capsule     cetirizine (ZYRTEC) 10 MG tablet     fluticasone (FLONASE) 50 MCG/ACT nasal spray     Pseudoephedrine-APAP  MG TABS     tiZANidine (ZANAFLEX) 4 MG tablet     No current facility-administered medications for this visit.     BP (!) 148/91   Pulse 111   Wt 83 kg (183 lb)   SpO2 97%   BMI 26.26 kg/m      PHYSICAL EXAMINATION:    GENERAL:  He is in a large neck brace, but does appear in no acute distress.  HEENT:  No scleral icterus or temporal muscle wasting.  CHEST:  Clear.  ABDOMEN:  No obvious ascites.  No masses or tenderness to palpation is present.  His liver is 10 cm in span without left lobe enlargement.  No spleen tip is palpable.  EXTREMITIES:  No edema.  SKIN:  No stigmata of chronic liver disease.  NEUROLOGIC:  No asterixis.    Recent Results (from the past 168 hour(s))   Basic metabolic panel    Collection Time: 04/19/22  8:57 AM   Result Value Ref Range    Sodium 140 133 - 144 mmol/L    Potassium 3.8 3.4 - 5.3 mmol/L    Chloride 105 94 - 109 mmol/L    Carbon Dioxide (CO2) 27 20 - 32 mmol/L    Anion Gap 8 3 - 14 mmol/L    Urea Nitrogen 5 (L) 7 - 30 mg/dL    Creatinine 0.55 (L) 0.66 - 1.25 mg/dL    Calcium 9.5 8.5 - 10.1 mg/dL    Glucose 195 (H) 70 - 99 mg/dL    GFR Estimate >90 >60 mL/min/1.73m2   Hepatic panel    Collection Time: 04/19/22  8:57 AM   Result Value Ref Range    Bilirubin Total 2.1 (H) 0.2 - 1.3 mg/dL    Bilirubin Direct 1.0 (H) 0.0 - 0.2 mg/dL    Protein Total 6.4 (L) 6.8 - 8.8 g/dL    Albumin 3.2 (L) 3.4 - 5.0 g/dL    Alkaline Phosphatase 330 (H) 40 - 150 U/L    AST 66 (H) 0 - 45 U/L    ALT 41 0 - 70 U/L   INR    Collection Time: 04/19/22  8:57 AM   Result Value Ref Range    INR 1.17 (H) 0.85 - 1.15   CBC with platelets and differential    Collection Time: 04/19/22  8:57 AM    Result Value Ref Range    WBC Count 12.2 (H) 4.0 - 11.0 10e3/uL    RBC Count 4.95 4.40 - 5.90 10e6/uL    Hemoglobin 15.0 13.3 - 17.7 g/dL    Hematocrit 45.9 40.0 - 53.0 %    MCV 93 78 - 100 fL    MCH 30.3 26.5 - 33.0 pg    MCHC 32.7 31.5 - 36.5 g/dL    RDW 16.2 (H) 10.0 - 15.0 %    Platelet Count 107 (L) 150 - 450 10e3/uL      IMPRESSION:  Mr. Martinez has cirrhosis caused by chronic hepatitis C and complicated by hepatocellular carcinoma.  He will get a followup MRI this afternoon, which I will review.  He is currently listed for transplantation, but is inactive because of his injuries.  I will continue to keep him active, as he will accumulate waiting time points, and we will see him back in the clinic in 3 months in order to reassess whether we should fully reactivate him at that point or not.  He also will get an MRI at that point in time.    He does seem to have a lot of pain from his comminuted displaced fracture of the scapula, and I have recommended he see his orthopedic surgeon again in the next 6 weeks, and if he still is having this much pain, he should strongly consider surgical repair.  He is seeing the neurosurgeon within the next week about his spinal fracture.  He did have an ultrasound that shows his TIPS is working well, and he has no ascites.  I otherwise will not be making any other change to his medical regimen.  I will see him back in the clinic in 3 months.    Thank you very much for allowing me to participate in the care of this patient.  If you have any questions regarding my recommendations, please do not hesitate to contact me.         Juancho Willard MD      Professor of Medicine  [employer]      Executive Medical Director, Solid Organ Transplant Program  [facility]

## 2022-04-22 LAB
PLPETH BLD-MCNC: <10 NG/ML
POPETH BLD-MCNC: <10 NG/ML

## 2022-04-27 ENCOUNTER — VIRTUAL VISIT (OUTPATIENT)
Dept: VASCULAR SURGERY | Facility: CLINIC | Age: 67
End: 2022-04-27
Payer: COMMERCIAL

## 2022-04-27 DIAGNOSIS — Z95.828 S/P TIPS (TRANSJUGULAR INTRAHEPATIC PORTOSYSTEMIC SHUNT): Primary | ICD-10-CM

## 2022-04-27 DIAGNOSIS — C22.0 HCC (HEPATOCELLULAR CARCINOMA) (H): ICD-10-CM

## 2022-04-27 PROCEDURE — 99213 OFFICE O/P EST LOW 20 MIN: CPT | Mod: 95 | Performed by: RADIOLOGY

## 2022-04-27 NOTE — PROGRESS NOTES
"[facility] Vascular      Type of Visit: Virtual: Ivet    Patient is here for a return visit to discuss follow up.     Vitals - Patient Reported  Weight (Patient Reported): 183 lb  Height (Patient Reported): 5' 11\"  BMI (Based on Pt Reported Ht/Wt): 25.52  Pain Score: Moderate Pain (5)      Questions patient would like addressed today are: Patient verbalized no questions/concerns, this has been communicated to the provider.     Refills are needed: Yes: states out of oxycodone 5mg    How would you like to obtain your AVS? Mikal Stoll, Virtual Facilitator/CMA, 4/27/2022     "

## 2022-04-27 NOTE — PROGRESS NOTES
Mr. Martinez returns to care approximately 4 months after selective internal radial therapy of hepatocellular carcinoma in segment 4.  Since last time I saw him, he unfortunately suffered a traumatic fall a few weeks ago and suffered both spinal and shoulder fractures.  He continues to have significant pain related to the trauma.  Otherwise, from his liver standpoint, he no abdominal pain.  No nausea or vomiting.  No signs of GI bleeding.  No lower extremity edema.  I placed a TIPS in him in August 2016 which is stable without recurrent ascites.    Physical exam: Deferred    Labs:    Liver Function Studies - Recent Labs   Lab Test 04/19/22  0857   PROTTOTAL 6.4*   ALBUMIN 3.2*   BILITOTAL 2.1*   ALKPHOS 330*   AST 66*   ALT 41     Imaging:    I reviewed the MRI from 4/19/2022 which demonstrates:    1. Cirrhosis and evidence of portal hypertension. Patent TIPS.  2. Posttreatment changes in segment 4. No evidence of residual  enhancing tumor. LIRADS TR-nonviable.   3. Stable size of the 10 mm segment 6 subcapsular lesion which now  demonstrates pseudocapsule. LIRADS 4.  4. Stable 8 mm segment 2 lesion without washout or pseudocapsule.  LIRADS 3.  5. Based on this exam only, the patient is within Mehrdad criteria.  6. Small cystic focus in the pancreatic body, likely side branch IPMN.  Attention on follow-up.    Assessment/plan:    66-year-old with cirrhosis, HCC status post radioembolization, and status post TIPS, segment 4 lesion shows complete response at 4 months without concern for residual tumor.  He does have a LIRADS 4 lesion that we will continue to surveille.    His biggest concern currently is the severe pain that wakes him up at night associate with his shoulder trauma.  Given the discomfort and my history with the patient, I will prescribe him a couple of days worth of pain medications and will mail the prescription tomorrow.  I did encourage him that for further pain management, he should establish a plan  with his primary provider in [place].    As for future follow-up, we will see him at the 6-month posttreatment with a repeat MRI and complete metabolic profile somewhere in late June/early July.

## 2022-04-27 NOTE — LETTER
"4/27/2022       RE: Mehdi Martinez  [address] [facility]  [place] [address]     Dear Colleague,    Thank you for referring your patient, Mehdi Martinez, to the [facility] VASCULAR CLINIC [place] at [facility]. Please see a copy of my visit note below.    [facility] Vascular      Type of Visit: Virtual: Ivet    Patient is here for a return visit to discuss follow up.     Vitals - Patient Reported  Weight (Patient Reported): 183 lb  Height (Patient Reported): 5' 11\"  BMI (Based on Pt Reported Ht/Wt): 25.52  Pain Score: Moderate Pain (5)      Questions patient would like addressed today are: Patient verbalized no questions/concerns, this has been communicated to the provider.     Refills are needed: Yes: states out of oxycodone 5mg    How would you like to obtain your AVS? Mikal Stoll, Virtual Facilitator/CMA, 4/27/2022       Mr. Martinez returns to care approximately 4 months after selective internal radial therapy of hepatocellular carcinoma in segment 4.  Since last time I saw him, he unfortunately suffered a traumatic fall a few weeks ago and suffered both spinal and shoulder fractures.  He continues to have significant pain related to the trauma.  Otherwise, from his liver standpoint, he no abdominal pain.  No nausea or vomiting.  No signs of GI bleeding.  No lower extremity edema.  I placed a TIPS in him in August 2016 which is stable without recurrent ascites.    Physical exam: Deferred    Labs:    Liver Function Studies - Recent Labs   Lab Test 04/19/22  0857   PROTTOTAL 6.4*   ALBUMIN 3.2*   BILITOTAL 2.1*   ALKPHOS 330*   AST 66*   ALT 41     Imaging:    I reviewed the MRI from 4/19/2022 which demonstrates:    1. Cirrhosis and evidence of portal hypertension. Patent TIPS.  2. Posttreatment changes in segment 4. No evidence of residual  enhancing tumor. LIRADS TR-nonviable.   3. Stable size of the 10 mm segment 6 subcapsular lesion " which now  demonstrates pseudocapsule. LIRADS 4.  4. Stable 8 mm segment 2 lesion without washout or pseudocapsule.  LIRADS 3.  5. Based on this exam only, the patient is within [place] criteria.  6. Small cystic focus in the pancreatic body, likely side branch IPMN.  Attention on follow-up.    Assessment/plan:    66-year-old with cirrhosis, HCC status post radioembolization, and status post TIPS, segment 4 lesion shows complete response at 4 months without concern for residual tumor.  He does have a LIRADS 4 lesion that we will continue to surveille.    His biggest concern currently is the severe pain that wakes him up at night associate with his shoulder trauma.  Given the discomfort and my history with the patient, I will prescribe him a couple of days worth of pain medications and will mail the prescription tomorrow.  I did encourage him that for further pain management, he should establish a plan with his primary provider in [place].    As for future follow-up, we will see him at the 6-month posttreatment with a repeat MRI and complete metabolic profile somewhere in late June/early July.      Sincerely,    Rohan Lai MD

## 2022-04-27 NOTE — NURSING NOTE
Chief Complaint   Patient presents with     Follow Up       Patient confirms medications and allergies are accurate via patients echeck in completion, and or denies any changes since last reviewed/verified.     Gayle Stoll, Virtual Facilitator

## 2022-04-28 RX ORDER — OXYCODONE HYDROCHLORIDE 5 MG/1
5 TABLET ORAL EVERY 6 HOURS PRN
Qty: 10 TABLET | Refills: 0 | Status: SHIPPED | OUTPATIENT
Start: 2022-04-28 | End: 2022-05-01

## 2022-04-28 RX ORDER — OXYCODONE HYDROCHLORIDE 5 MG/1
5 TABLET ORAL EVERY 6 HOURS PRN
Qty: 10 TABLET | Refills: 0 | Status: SHIPPED | OUTPATIENT
Start: 2022-04-28 | End: 2022-04-28

## 2022-04-29 ENCOUNTER — DOCUMENTATION ONLY (OUTPATIENT)
Dept: TRANSPLANT | Facility: CLINIC | Age: 67
End: 2022-04-29
Payer: COMMERCIAL

## 2022-04-29 NOTE — PROGRESS NOTES
[facility] LIVER TUMOR BOARD NOTE     DATE OF TUMOR BOARD: April 29, 2022      SCAN REVIEWED: 4/19/22 MRI     Discussion:  -post-treatment changes seen. No enhancement, non-viable lesion.  -1cm arterial enhancing lesion seen, shows washout & subtle rim, LR4 (concerning for HCC)  -in previous images of delayed phase, washout not shown, similar size (9mm).     Plan:  -MRI in 3 months    April 29, 2022 12:54 PM - Theron Tenorio RN:

## 2022-05-15 ENCOUNTER — HEALTH MAINTENANCE LETTER (OUTPATIENT)
Age: 67
End: 2022-05-15

## 2022-06-27 DIAGNOSIS — C22.0 HCC (HEPATOCELLULAR CARCINOMA) (H): Primary | ICD-10-CM

## 2022-06-27 DIAGNOSIS — B18.2 CHRONIC HEPATITIS C WITHOUT HEPATIC COMA (H): ICD-10-CM

## 2022-07-05 ENCOUNTER — OFFICE VISIT (OUTPATIENT)
Dept: GASTROENTEROLOGY | Facility: CLINIC | Age: 67
End: 2022-07-05
Attending: INTERNAL MEDICINE
Payer: COMMERCIAL

## 2022-07-05 ENCOUNTER — ANCILLARY PROCEDURE (OUTPATIENT)
Dept: MRI IMAGING | Facility: CLINIC | Age: 67
End: 2022-07-05
Attending: RADIOLOGY
Payer: COMMERCIAL

## 2022-07-05 ENCOUNTER — LAB (OUTPATIENT)
Dept: LAB | Facility: CLINIC | Age: 67
End: 2022-07-05
Payer: COMMERCIAL

## 2022-07-05 VITALS
BODY MASS INDEX: 25.64 KG/M2 | WEIGHT: 178.7 LBS | SYSTOLIC BLOOD PRESSURE: 192 MMHG | HEART RATE: 98 BPM | OXYGEN SATURATION: 97 % | DIASTOLIC BLOOD PRESSURE: 92 MMHG

## 2022-07-05 DIAGNOSIS — K74.60 CIRRHOSIS OF LIVER WITHOUT ASCITES, UNSPECIFIED HEPATIC CIRRHOSIS TYPE (H): Primary | ICD-10-CM

## 2022-07-05 DIAGNOSIS — Z95.828 S/P TIPS (TRANSJUGULAR INTRAHEPATIC PORTOSYSTEMIC SHUNT): ICD-10-CM

## 2022-07-05 DIAGNOSIS — K70.31 ALCOHOLIC CIRRHOSIS OF LIVER WITH ASCITES (H): Primary | ICD-10-CM

## 2022-07-05 DIAGNOSIS — K70.31 ALCOHOLIC CIRRHOSIS OF LIVER WITH ASCITES (H): ICD-10-CM

## 2022-07-05 DIAGNOSIS — C22.0 HCC (HEPATOCELLULAR CARCINOMA) (H): ICD-10-CM

## 2022-07-05 DIAGNOSIS — K70.30 ALCOHOLIC CIRRHOSIS (H): ICD-10-CM

## 2022-07-05 LAB
AFP SERPL-MCNC: 6.5 NG/ML
ALBUMIN SERPL-MCNC: 3.4 G/DL (ref 3.4–5)
ALP SERPL-CCNC: 304 U/L (ref 40–150)
ALT SERPL W P-5'-P-CCNC: 51 U/L (ref 0–70)
ANION GAP SERPL CALCULATED.3IONS-SCNC: 3 MMOL/L (ref 3–14)
AST SERPL W P-5'-P-CCNC: 74 U/L (ref 0–45)
BILIRUB DIRECT SERPL-MCNC: 0.9 MG/DL (ref 0–0.2)
BILIRUB SERPL-MCNC: 2.4 MG/DL (ref 0.2–1.3)
BUN SERPL-MCNC: 8 MG/DL (ref 7–30)
CALCIUM SERPL-MCNC: 9.6 MG/DL (ref 8.5–10.1)
CHLORIDE BLD-SCNC: 110 MMOL/L (ref 94–109)
CO2 SERPL-SCNC: 27 MMOL/L (ref 20–32)
CREAT SERPL-MCNC: 0.59 MG/DL (ref 0.66–1.25)
GFR SERPL CREATININE-BSD FRML MDRD: >90 ML/MIN/1.73M2
GLUCOSE BLD-MCNC: 128 MG/DL (ref 70–99)
INR PPP: 1.25 (ref 0.85–1.15)
POTASSIUM BLD-SCNC: 3.5 MMOL/L (ref 3.4–5.3)
PROT SERPL-MCNC: 6.3 G/DL (ref 6.8–8.8)
SODIUM SERPL-SCNC: 140 MMOL/L (ref 133–144)

## 2022-07-05 PROCEDURE — 36415 COLL VENOUS BLD VENIPUNCTURE: CPT | Performed by: PATHOLOGY

## 2022-07-05 PROCEDURE — 80053 COMPREHEN METABOLIC PANEL: CPT | Performed by: PATHOLOGY

## 2022-07-05 PROCEDURE — 74183 MRI ABD W/O CNTR FLWD CNTR: CPT | Performed by: RADIOLOGY

## 2022-07-05 PROCEDURE — 85610 PROTHROMBIN TIME: CPT | Performed by: PATHOLOGY

## 2022-07-05 PROCEDURE — 99215 OFFICE O/P EST HI 40 MIN: CPT | Performed by: INTERNAL MEDICINE

## 2022-07-05 PROCEDURE — 80321 ALCOHOLS BIOMARKERS 1OR 2: CPT | Mod: 90 | Performed by: PATHOLOGY

## 2022-07-05 PROCEDURE — A9585 GADOBUTROL INJECTION: HCPCS | Performed by: RADIOLOGY

## 2022-07-05 PROCEDURE — 99000 SPECIMEN HANDLING OFFICE-LAB: CPT | Performed by: PATHOLOGY

## 2022-07-05 PROCEDURE — 82248 BILIRUBIN DIRECT: CPT | Performed by: PATHOLOGY

## 2022-07-05 PROCEDURE — 82105 ALPHA-FETOPROTEIN SERUM: CPT | Performed by: INTERNAL MEDICINE

## 2022-07-05 RX ORDER — GADOBUTROL 604.72 MG/ML
10 INJECTION INTRAVENOUS ONCE
Status: DISCONTINUED | OUTPATIENT
Start: 2022-07-05 | End: 2022-07-05 | Stop reason: CLARIF

## 2022-07-05 RX ORDER — GADOBUTROL 604.72 MG/ML
10 INJECTION INTRAVENOUS ONCE
Status: COMPLETED | OUTPATIENT
Start: 2022-07-05 | End: 2022-07-05

## 2022-07-05 RX ADMIN — GADOBUTROL 8.5 ML: 604.72 INJECTION INTRAVENOUS at 10:32

## 2022-07-05 NOTE — PROGRESS NOTES
HISTORY OF PRESENT ILLNESS:  I had the pleasure of seeing Mehdi Martinez for followup in the Liver Transplant Clinic at the [facility] on 07/05/2022.  Mr. 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  He has been evaluated for liver transplantation, but is currently not active on the wait list because he took a fall last winter and has been very slow to recover from that.     Indeed, his wife is with him today and reports that he appears to have significant cognitive impairment.  He is also quite weak and unsteady on his feet and has had falls at home.  He has also had some incontinence of urine.    He denies any abdominal pain, although he does complain of significant shoulder and neck pain.  He denies any itching or skin rash.  He does have fatigue.  He denies any increased abdominal girth or lower extremity edema.  He has not had any gastrointestinal bleeding.  It is a bit unclear whether he does have encephalopathy as a cause of his decreased cognitive impairment.  It appears as though he was taken off lactulose when he was discharged from [facility] after the fall.  He is on rifaximin.    He denies any fevers or chills, cough or shortness of breath.  He has had 3 doses of the COVID-19 vaccine.  He denies any nausea or vomiting.  He says he is having at least 1 bowel movement per day.  His appetite, his wife reports, is quite good, and his weight for the most part has been stable.    Current Outpatient Medications   Medication     aMILoride (MIDAMOR) 5 MG tablet     Bioflavonoid Products (VITAMIN C) CHEW     folic acid (FOLVITE) 1 MG tablet     lactulose (CEPHULAC) 20 GM packet     metFORMIN (GLUCOPHAGE) 1000 MG tablet     methotrexate 2.5 MG tablet     methylPREDNISolone (MEDROL DOSEPAK) 4 MG tablet therapy pack     ondansetron (ZOFRAN) 4 MG tablet     pantoprazole  (PROTONIX) 40 MG EC tablet     rifaximin (XIFAXAN) 550 MG TABS tablet     zinc sulfate (ZINCATE) 220 MG capsule     cetirizine (ZYRTEC) 10 MG tablet     fluticasone (FLONASE) 50 MCG/ACT nasal spray     Pseudoephedrine-APAP  MG TABS     tiZANidine (ZANAFLEX) 4 MG tablet     No current facility-administered medications for this visit.     BP (!) 192/92   Pulse 98   Wt 81.1 kg (178 lb 11.2 oz)   SpO2 97%   BMI 25.64 kg/m      PHYSICAL EXAMINATION:    GENERAL:  He looks chronically ill and just a bit off from a mental status standpoint.  HEENT:  No scleral icterus or temporal muscle wasting.  CHEST:  Clear.  ABDOMEN:  No obvious ascites.  No masses or tenderness to palpation is present.  His liver is 10 cm in span without left lobe enlargement.  No spleen tip is palpable.  EXTREMITIES:  No edema.  SKIN:  No stigmata of chronic liver disease.  NEUROLOGIC:  No asterixis.    Recent Results (from the past 168 hour(s))   INR    Collection Time: 07/05/22  9:49 AM   Result Value Ref Range    INR 1.25 (H) 0.85 - 1.15   Comprehensive metabolic panel    Collection Time: 07/05/22  9:49 AM   Result Value Ref Range    Sodium 140 133 - 144 mmol/L    Potassium 3.5 3.4 - 5.3 mmol/L    Chloride 110 (H) 94 - 109 mmol/L    Carbon Dioxide (CO2) 27 20 - 32 mmol/L    Anion Gap 3 3 - 14 mmol/L    Urea Nitrogen 8 7 - 30 mg/dL    Creatinine 0.59 (L) 0.66 - 1.25 mg/dL    Calcium 9.6 8.5 - 10.1 mg/dL    Glucose 128 (H) 70 - 99 mg/dL    Alkaline Phosphatase 304 (H) 40 - 150 U/L    AST 74 (H) 0 - 45 U/L    ALT 51 0 - 70 U/L    Protein Total 6.3 (L) 6.8 - 8.8 g/dL    Albumin 3.4 3.4 - 5.0 g/dL    Bilirubin Total 2.4 (H) 0.2 - 1.3 mg/dL    GFR Estimate >90 >60 mL/min/1.73m2   Bilirubin direct    Collection Time: 07/05/22  9:49 AM   Result Value Ref Range    Bilirubin Direct 0.9 (H) 0.0 - 0.2 mg/dL      MELD-Na score: 13 at 7/5/2022  9:49 AM  MELD score: 13 at 7/5/2022  9:49 AM  Calculated from:  Serum Creatinine: 0.59 mg/dL (Using min of 1  mg/dL) at 7/5/2022  9:49 AM  Serum Sodium: 140 mmol/L (Using max of 137 mmol/L) at 7/5/2022  9:49 AM  Total Bilirubin: 2.4 mg/dL at 7/5/2022  9:49 AM  INR(ratio): 1.25 at 7/5/2022  9:49 AM  Age: 66 years    He did have an MRI of his liver, but unfortunately, I am unable to pull up the results.    IMPRESSION:  Mr. 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  It is complicated by hepatocellular carcinoma.  We will follow up on his MRI once the images are available to make sure that his tumor is under good control.    More importantly, we need to sort out what is going on with his mental status.  I will start him back on lactulose shooting for 2-5 bowel movements per day.  I will also schedule him for neuropsych testing.  It is possible that he has experienced some sort of cognitive decline.    He is also quite weak and I will get a physical therapy assessment out to his home and so they can begin to work with him and try to get him strengthened and get him back to the point where he might be a transplant candidate.    He does qualify for a fourth dose of the COVID-19 vaccine.  I encouraged him to get that as well.  Otherwise, I will see him back in the clinic again in 3 months.    Thank you very much for allowing me to participate in the care of this patient.  If you have any questions regarding my recommendations, please do not hesitate to contact me.         Juancho Willard MD      Professor of Medicine  [employer]      Executive Medical Director, Solid Organ Transplant Program  [facility]

## 2022-07-05 NOTE — DISCHARGE INSTRUCTIONS
MRI Contrast Discharge Instructions    The IV contrast you received today will pass out of your body in your  urine. This will happen in the next 24 hours. You will not feel this process.  Your urine will not change color.    Drink at least 4 extra glasses of water or juice today (unless your doctor  has restricted your fluids). This reduces the stress on your kidneys.  You may take your regular medicines.    If you are on dialysis: It is best to have dialysis today.    If you have a reaction: Most reactions happen right away. If you have  any new symptoms after leaving the hospital (such as hives or swelling),  call your hospital at the correct number below. Or call your family doctor.  If you have breathing distress or wheezing, call 911.    Special instructions: ***    I have read and understand the above information.    Signature:______________________________________ Date:___________    Staff:__________________________________________ Date:___________     Time:__________    [facility] Departments:    ___[place]: [phone]  ___[facility]: [phone]  ___[place]: [phone] ___[facility]: [phone]  ___[facility]: [phone]  ___[facility]: [phone]  ___[phone]  ___[facility]: [phone]  ___[phone]

## 2022-07-05 NOTE — PROGRESS NOTES
"[facility] Vascular      Type of Visit: Virtual: Ivet    Patient is here for a return visit to discuss follow up.     Vitals - Patient Reported  Weight (Patient Reported): 80.7 kg (178 lb)  Height (Patient Reported): 180.3 cm (5' 11\")  BMI (Based on Pt Reported Ht/Wt): 24.83  Pain Score: Severe Pain (6)  Pain Loc:  (right arm, neck)      Questions patient would like addressed today are: Patient verbalized no questions/concerns, this has been communicated to the provider.   Would like to discuss a medication to help him sleep.     Refills are needed: No    How would you like to obtain your AVS? Mikal Stoll            INTERVENTIONAL RADIOLOGY ESTABLISHED PATIENT FOLLOW UP      HPI: 66-year-old with cirrhosis complicated by HCC status post radioembolization 12/14/21 and TIPS 8/9/16 presenting for follow up after TARE.     Today, patient and his wife say that he is quite week. He recently had a fall and had a scapular fracture. He has worsening issues with confusion, slowed speech, and poor mentation. His wife is concerned about his worsening incontinence. He has issues with poor sleep. Overall, he feels quite tired. He is not currently on lactulose per Dr. Willard of GI.      ROS:  Negative unless otherwise stated in HPI.      Physical Examination:   VITALS:   There were no vitals taken for this visit.     GENERAL: Healthy, alert and no distress  EYES: Eyes grossly normal to inspection.  No discharge or erythema, or obvious scleral/conjunctival abnormalities.  RESP: No audible wheeze, cough, or visible cyanosis.  No visible retractions or increased work of breathing.    SKIN: Visible skin clear. No significant rash, abnormal pigmentation or lesions.  NEURO: Cranial nerves grossly intact.    PSYCH: Mentation appears slowed, affect blunted, judgement and insight intact, slow speech but speaking in full sentences.      Labs:  AFP 7/5: 6.5, previous AFP on 4/19: 5.7.    Diagnostic studies: Personally " reviewed and interpreted.    MR abdomen 7/5/2022: Unchanged posttreatment changes of the segment 4 lesion status post radioembolization without residual findings to suggest recurrent/residual disease.  The subcapsular arterially enhancing 1 cm lesion in segment 6 is again seen, this is essentially unchanged dating back to 4/19/2022.    Assessment: 66-year-old with cirrhosis complicated by HCC status post radioembolization 12/14/21 and TIPS 8/9/16 presenting for follow up after TARE.  Patient is doing well with unchanged appearance of the lie RADS 4 lesion in the subcapsular segment 6.  We will continue to surveilled this lesion and if there are increasing laboratory markers or imaging findings on follow-up MRI, intervention (likely microwave ablation), can be considered at that time.    Plan:   -Follow up in 6 months with repeat abdominal MRI with contrast and AFP, BMP, CBC, and LFTs at that time.   -Repeat TIPS US at follow up appointment.   -Further management of lactulose and encephalopathy per GI.     Discussed with Dr. Ming Rojas MD  Interventional Radiology Fellow, PGY-6.    I interviewed the patient with the resident and agree with the assessment and plan.    Esme Nam MD    CC  Patient Care Team:  David Heller as PCP - General (Family Practice)  Calos Hernandes MD as MD (Internal Medicine)  Juacnho Willard MD as MD (Gastroenterology)  Marlo Hoang as Physician (Hematology & Oncology)  Esme Nam MD as Resident (Radiology)  Juancho Willard MD as Assigned Surgical Provider  Evy Tamez RN as Nurse Coordinator (Transplant)  Esme Nam MD as Assigned Heart and Vascular Provider  Buddy Wolfe MD as Assigned Cancer Care Provider  ESME NAM        Video-Visit Details     Type of service:  Video Visit     Video Start and End Time:[id number]    Originating Location (pt. Location): Other car in [place], [place]     Distant Location (provider location):   [facility] VASCULAR CLINIC [place]      Platform used for Video Visit: Ivet

## 2022-07-05 NOTE — LETTER
7/5/2022         RE: Mehdi Martinez  [address] [facility] [place]  [place] [address]        Dear Colleague,    Thank you for referring your patient, Mehdi Martinez, to the [facility] HEPATOLOGY CLINIC [place]. Please see a copy of my visit note below.    HISTORY OF PRESENT ILLNESS:  I had the pleasure of seeing Mehdi Martinez for followup in the Liver Transplant Clinic at the [facility] on 07/05/2022.  Mr. 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  He has been evaluated for liver transplantation, but is currently not active on the wait list because he took a fall last winter and has been very slow to recover from that.     Indeed, his wife is with him today and reports that he appears to have significant cognitive impairment.  He is also quite weak and unsteady on his feet and has had falls at home.  He has also had some incontinence of urine.    He denies any abdominal pain, although he does complain of significant shoulder and neck pain.  He denies any itching or skin rash.  He does have fatigue.  He denies any increased abdominal girth or lower extremity edema.  He has not had any gastrointestinal bleeding.  It is a bit unclear whether he does have encephalopathy as a cause of his decreased cognitive impairment.  It appears as though he was taken off lactulose when he was discharged from [facility] after the fall.  He is on rifaximin.    He denies any fevers or chills, cough or shortness of breath.  He has had 3 doses of the COVID-19 vaccine.  He denies any nausea or vomiting.  He says he is having at least 1 bowel movement per day.  His appetite, his wife reports, is quite good, and his weight for the most part has been stable.    Current Outpatient Medications   Medication     aMILoride (MIDAMOR) 5 MG tablet     Bioflavonoid Products (VITAMIN C) CHEW      folic acid (FOLVITE) 1 MG tablet     lactulose (CEPHULAC) 20 GM packet     metFORMIN (GLUCOPHAGE) 1000 MG tablet     methotrexate 2.5 MG tablet     methylPREDNISolone (MEDROL DOSEPAK) 4 MG tablet therapy pack     ondansetron (ZOFRAN) 4 MG tablet     pantoprazole (PROTONIX) 40 MG EC tablet     rifaximin (XIFAXAN) 550 MG TABS tablet     zinc sulfate (ZINCATE) 220 MG capsule     cetirizine (ZYRTEC) 10 MG tablet     fluticasone (FLONASE) 50 MCG/ACT nasal spray     Pseudoephedrine-APAP  MG TABS     tiZANidine (ZANAFLEX) 4 MG tablet     No current facility-administered medications for this visit.     BP (!) 192/92   Pulse 98   Wt 81.1 kg (178 lb 11.2 oz)   SpO2 97%   BMI 25.64 kg/m      PHYSICAL EXAMINATION:    GENERAL:  He looks chronically ill and just a bit off from a mental status standpoint.  HEENT:  No scleral icterus or temporal muscle wasting.  CHEST:  Clear.  ABDOMEN:  No obvious ascites.  No masses or tenderness to palpation is present.  His liver is 10 cm in span without left lobe enlargement.  No spleen tip is palpable.  EXTREMITIES:  No edema.  SKIN:  No stigmata of chronic liver disease.  NEUROLOGIC:  No asterixis.    Recent Results (from the past 168 hour(s))   INR    Collection Time: 07/05/22  9:49 AM   Result Value Ref Range    INR 1.25 (H) 0.85 - 1.15   Comprehensive metabolic panel    Collection Time: 07/05/22  9:49 AM   Result Value Ref Range    Sodium 140 133 - 144 mmol/L    Potassium 3.5 3.4 - 5.3 mmol/L    Chloride 110 (H) 94 - 109 mmol/L    Carbon Dioxide (CO2) 27 20 - 32 mmol/L    Anion Gap 3 3 - 14 mmol/L    Urea Nitrogen 8 7 - 30 mg/dL    Creatinine 0.59 (L) 0.66 - 1.25 mg/dL    Calcium 9.6 8.5 - 10.1 mg/dL    Glucose 128 (H) 70 - 99 mg/dL    Alkaline Phosphatase 304 (H) 40 - 150 U/L    AST 74 (H) 0 - 45 U/L    ALT 51 0 - 70 U/L    Protein Total 6.3 (L) 6.8 - 8.8 g/dL    Albumin 3.4 3.4 - 5.0 g/dL    Bilirubin Total 2.4 (H) 0.2 - 1.3 mg/dL    GFR Estimate >90 >60 mL/min/1.73m2    Bilirubin direct    Collection Time: 07/05/22  9:49 AM   Result Value Ref Range    Bilirubin Direct 0.9 (H) 0.0 - 0.2 mg/dL      MELD-Na score: 13 at 7/5/2022  9:49 AM  MELD score: 13 at 7/5/2022  9:49 AM  Calculated from:  Serum Creatinine: 0.59 mg/dL (Using min of 1 mg/dL) at 7/5/2022  9:49 AM  Serum Sodium: 140 mmol/L (Using max of 137 mmol/L) at 7/5/2022  9:49 AM  Total Bilirubin: 2.4 mg/dL at 7/5/2022  9:49 AM  INR(ratio): 1.25 at 7/5/2022  9:49 AM  Age: 66 years    He did have an MRI of his liver, but unfortunately, I am unable to pull up the results.    IMPRESSION:  Mr. 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  It is complicated by hepatocellular carcinoma.  We will follow up on his MRI once the images are available to make sure that his tumor is under good control.    More importantly, we need to sort out what is going on with his mental status.  I will start him back on lactulose shooting for 2-5 bowel movements per day.  I will also schedule him for neuropsych testing.  It is possible that he has experienced some sort of cognitive decline.    He is also quite weak and I will get a physical therapy assessment out to his home and so they can begin to work with him and try to get him strengthened and get him back to the point where he might be a transplant candidate.    He does qualify for a fourth dose of the COVID-19 vaccine.  I encouraged him to get that as well.  Otherwise, I will see him back in the clinic again in 3 months.    Thank you very much for allowing me to participate in the care of this patient.  If you have any questions regarding my recommendations, please do not hesitate to contact me.         Juancho Willard MD      Professor of Medicine  [employer]      Executive Medical Director, Solid Organ Transplant Program  [facility]

## 2022-07-05 NOTE — PROGRESS NOTES
July 5, 2022 11:56 AM - Theron Tenorio RN:     Orders entered for neuropsychology testing and physical therapy (PT assessment to be done here at [facility] per Dr. Willard's request).

## 2022-07-06 ENCOUNTER — VIRTUAL VISIT (OUTPATIENT)
Dept: VASCULAR SURGERY | Facility: CLINIC | Age: 67
End: 2022-07-06
Payer: COMMERCIAL

## 2022-07-06 DIAGNOSIS — C22.0 HCC (HEPATOCELLULAR CARCINOMA) (H): Primary | ICD-10-CM

## 2022-07-06 PROCEDURE — 99213 OFFICE O/P EST LOW 20 MIN: CPT | Mod: 95 | Performed by: RADIOLOGY

## 2022-07-06 ASSESSMENT — PATIENT HEALTH QUESTIONNAIRE - PHQ9: SUM OF ALL RESPONSES TO PHQ QUESTIONS 1-9: 21

## 2022-07-06 NOTE — PATIENT INSTRUCTIONS
You were seen today in the Vascular IR Clinic by Dr Lai for follow up regarding your Y90 procedure.     Plan:   Follow up with Dr. Lai in 3-4 months   Abdominal MRI and TIPS Ultrasound 3-4 months with labs prior to appointment.       Please call or send a MyChart with any questions or concerns.    Zahira Rea RN, BSN   Interventional Radiology RNCC   [phone]

## 2022-07-06 NOTE — LETTER
"7/6/2022       RE: Mehdi Martinez  [address] [place] [address]     Dear Colleague,    Thank you for referring your patient, Mehdi Martinez, to the [facility] VASCULAR CLINIC [place] at [facility]. Please see a copy of my visit note below.    [facility] Vascular      Type of Visit: Virtual: Ivet    Patient is here for a return visit to discuss follow up.     Vitals - Patient Reported  Weight (Patient Reported): 80.7 kg (178 lb)  Height (Patient Reported): 180.3 cm (5' 11\")  BMI (Based on Pt Reported Ht/Wt): 24.83  Pain Score: Severe Pain (6)  Pain Loc:  (right arm, neck)      Questions patient would like addressed today are: Patient verbalized no questions/concerns, this has been communicated to the provider.   Would like to discuss a medication to help him sleep.     Refills are needed: No    How would you like to obtain your AVS? Mikal Stoll            INTERVENTIONAL RADIOLOGY ESTABLISHED PATIENT FOLLOW UP      HPI: 66-year-old with cirrhosis complicated by HCC status post radioembolization 12/14/21 and TIPS 8/9/16 presenting for follow up after TARE.     Today, patient and his wife say that he is quite week. He recently had a fall and had a scapular fracture. He has worsening issues with confusion, slowed speech, and poor mentation. His wife is concerned about his worsening incontinence. He has issues with poor sleep. Overall, he feels quite tired. He is not currently on lactulose per Dr. Willard of GI.      ROS:  Negative unless otherwise stated in HPI.      Physical Examination:   VITALS:   There were no vitals taken for this visit.     GENERAL: Healthy, alert and no distress  EYES: Eyes grossly normal to inspection.  No discharge or erythema, or obvious scleral/conjunctival abnormalities.  RESP: No audible wheeze, cough, or visible cyanosis.  No visible retractions or increased work of breathing.    SKIN: Visible skin clear. No " significant rash, abnormal pigmentation or lesions.  NEURO: Cranial nerves grossly intact.    PSYCH: Mentation appears slowed, affect blunted, judgement and insight intact, slow speech but speaking in full sentences.      Labs:  AFP 7/5: 6.5, previous AFP on 4/19: 5.7.    Diagnostic studies: Personally reviewed and interpreted.    MR abdomen 7/5/2022: Unchanged posttreatment changes of the segment 4 lesion status post radioembolization without residual findings to suggest recurrent/residual disease.  The subcapsular arterially enhancing 1 cm lesion in segment 6 is again seen, this is essentially unchanged dating back to 4/19/2022.    Assessment: 66-year-old with cirrhosis complicated by HCC status post radioembolization 12/14/21 and TIPS 8/9/16 presenting for follow up after TARE.  Patient is doing well with unchanged appearance of the lie RADS 4 lesion in the subcapsular segment 6.  We will continue to surveilled this lesion and if there are increasing laboratory markers or imaging findings on follow-up MRI, intervention (likely microwave ablation), can be considered at that time.    Plan:   -Follow up in 6 months with repeat abdominal MRI with contrast and AFP, BMP, CBC, and LFTs at that time.   -Repeat TIPS US at follow up appointment.   -Further management of lactulose and encephalopathy per GI.     Discussed with Dr. Ming Rojas MD  Interventional Radiology Fellow, PGY-6.    I interviewed the patient with the resident and agree with the assessment and plan.      Rohan Lai MD      CC  Patient Care Team:  David Heller as PCP - General (Family Practice)  Calos Hernandes MD as MD (Internal Medicine)  Juancho Willard MD as MD (Gastroenterology)  Marlo Hoang as Physician (Hematology & Oncology)  Rohan Lai MD as Resident (Radiology)  Juancho Willard MD as Assigned Surgical Provider  Evy Tamez RN as Nurse Coordinator (Transplant)  Rohan Lai MD as Assigned  Heart and Vascular Provider  Buddy Wolfe MD as Assigned Cancer Care Provider  ESME NAM

## 2022-07-07 LAB
PLPETH BLD-MCNC: <10 NG/ML
POPETH BLD-MCNC: <10 NG/ML

## 2022-07-08 ENCOUNTER — TELEPHONE (OUTPATIENT)
Dept: GASTROENTEROLOGY | Facility: CLINIC | Age: 67
End: 2022-07-08

## 2022-07-08 ENCOUNTER — DOCUMENTATION ONLY (OUTPATIENT)
Dept: TRANSPLANT | Facility: CLINIC | Age: 67
End: 2022-07-08

## 2022-07-08 DIAGNOSIS — K76.82 HEPATIC ENCEPHALOPATHY (H): Primary | ICD-10-CM

## 2022-07-08 RX ORDER — LACTULOSE 10 G/15ML
20 SOLUTION ORAL; RECTAL 2 TIMES DAILY
Qty: 1800 ML | Refills: 11 | Status: SHIPPED | OUTPATIENT
Start: 2022-07-08 | End: 2022-07-14

## 2022-07-08 NOTE — TELEPHONE ENCOUNTER
Per Dr. Sudheer rodriguez to change to lactulose solution. Order entered and discontinued lactulose packet order.    Morena HAWKINS LPN  Hepatology Clinic

## 2022-07-08 NOTE — TELEPHONE ENCOUNTER
[facility] Call Center    Phone Message    May a detailed message be left on voicemail: yes     Reason for Call: Medication Question or concern regarding medication   Prescription Clarification  Name of Medication: lactulose (CEPHULAC) 20 GM packet  Prescribing Provider: Dr. Juancho Willard   Pharmacy: [facility] - [place], [place] - [address] [place] [address]   What on the order needs clarification? Pharmacy was calling to let us know that this prescription is not covered by patient's plan, therefore they are hoping a substitute may be sent over. Sharon explained that the current prescription isn't covered but the lactulose solution 10 GM over 15 would be. Please call pharmacy back at [phone] (reference #[id number]), thank you!          Action Taken: Message routed to:  [facility] Hep    Travel Screening: Not Applicable

## 2022-07-08 NOTE — PROGRESS NOTES
July 8, 2022 1:07 PM - Theron Tenorio, RN:     [facility] LIVER TUMOR BOARD NOTE     DATE OF TUMOR BOARD: July 8th, 2022      SCAN REVIEWED: MRI 7/5/2022, reviewed by Dr. Abel Ewing     Discussion:  -TIPS in place. Post-treatment changes, nothing looks suspicious. LRTR nonviable.  -Another lesion in posterior lateral right, 1.2cm, called stable, may have some washout, little pseudo capsule, hard to tell for sure if it s getting washout. Lesion was previously called LR4/5, probably a little area of HCC. Official read says LR4, Dr. Ewing thinks it is washing out so calling it an LR5.  -Segment 2 used to have lesion, not seen on current study.     Plan:  -Per Dr. HERNÁN Gutierrez, could ablate it as it s a good size.  -Follow up in 6 months.  -Dr. Simon to message Dr. Willard & oncologist.

## 2022-07-11 ENCOUNTER — TELEPHONE (OUTPATIENT)
Dept: RADIOLOGY | Facility: CLINIC | Age: 67
End: 2022-07-11

## 2022-07-11 DIAGNOSIS — C22.0 HCC (HEPATOCELLULAR CARCINOMA) (H): Primary | ICD-10-CM

## 2022-07-11 NOTE — TELEPHONE ENCOUNTER
----- Message from Ella Zamorano RN sent at 7/11/2022  1:17 PM CDT -----  Regarding: Return pt  HI please reach out to patient, needs US abd limited, first and then either in person or Video visit with Dr Lai.  Due ASAP    Thank you,     ELLA Zamorano, RN, BSN  Interventional Radiology Nurse Coordinator   Phone:  [phone]

## 2022-07-11 NOTE — TELEPHONE ENCOUNTER
I called the pt to schedule the requested appointments and the pt stated several times that  does not want him to have an ultrasound done.   The pt declined to schedule.  Please call pt to insure the pt that this is  request to schedule these appointments

## 2022-07-13 ENCOUNTER — TELEPHONE (OUTPATIENT)
Dept: TRANSPLANT | Facility: CLINIC | Age: 67
End: 2022-07-13

## 2022-07-13 DIAGNOSIS — K76.82 HEPATIC ENCEPHALOPATHY (H): ICD-10-CM

## 2022-07-14 RX ORDER — LACTULOSE 10 G/15ML
20 SOLUTION ORAL; RECTAL 2 TIMES DAILY
Qty: 1800 ML | Refills: 11 | Status: SHIPPED | OUTPATIENT
Start: 2022-07-14 | End: 2022-09-15

## 2022-07-14 NOTE — TELEPHONE ENCOUNTER
Spoke with wife Emmie    Worsening confusion, balance issues in last 2 weeks.. Has not started on lactulose as of last appointment- was waiting on delivery from mail order and has not received.    Increased lethargy- awake and talking, but regressed in last 2 weeks- needs assistance of walker, assistance feeding and dressing.  Experiencing incontinence.     Lactulose sent to local pharmacy, asked  to get started ASAP this morning.  If not improving today, he should get evaluated in ER as he also had head injury several months ago, and he's experiencing a significant progression in sx (balance, lethargy, incontinence)  Preference to bring him into the [place] ER given overall complexity.  Advised if decreased alertness today, do not wait to go to ER, and do not wait more than 24 hours for lactulose to show if improvement.

## 2022-07-19 ENCOUNTER — TELEPHONE (OUTPATIENT)
Dept: TRANSPLANT | Facility: CLINIC | Age: 67
End: 2022-07-19

## 2022-07-19 DIAGNOSIS — K70.30 ALCOHOLIC CIRRHOSIS (H): Primary | ICD-10-CM

## 2022-07-19 DIAGNOSIS — C22.0 HCC (HEPATOCELLULAR CARCINOMA) (H): ICD-10-CM

## 2022-07-19 NOTE — TELEPHONE ENCOUNTER
Checked in with Emmie (spouse.)  Patient has had significant improvement in mental status since starting  Lactulose.  Re-discussed titration of BMs.  Patient starting to go out for walks, trying to gradually increase stamina. Will sent letter to PCP, patient would like to begin Physical therapy.       Also, spoke about most recent area- one area they have january watching they would like to go ahead and treat to keep him within Mehrdad criteria.  Wide is aware that IR will be reaching out to schedule ultraouns first to determine that area amenable to treatment plan.

## 2022-07-19 NOTE — LETTER
PHYSICIAN ORDERS      DATE & TIME ISSUED: 2022 12:27 PM  PATIENT NAME: Mehdi Martinez   : 1955     [facility] MR# : [id number]     DIAGNOSIS:  Cirrhosis, HCC  ICD-10 CODE: K70.3; C22.0  Orders  1 year after issue.      We respectfully request assistance from patient's primary careprovider to assist with referral for physical therapy due to overall conditioning related to his liver disease as well as injuries from past traumatic fall      For clinical question, karine contact Shante Tamez RN, at [phone].    .

## 2022-07-20 ENCOUNTER — TRANSCRIBE ORDERS (OUTPATIENT)
Dept: OTHER | Age: 67
End: 2022-07-20

## 2022-07-20 DIAGNOSIS — B18.2 CHRONIC HEPATITIS C WITH CIRRHOSIS (H): Primary | ICD-10-CM

## 2022-07-20 DIAGNOSIS — K74.60 CHRONIC HEPATITIS C WITH CIRRHOSIS (H): Primary | ICD-10-CM

## 2022-07-20 DIAGNOSIS — Z91.81 STATUS POST FALL: ICD-10-CM

## 2022-08-10 ENCOUNTER — VIRTUAL VISIT (OUTPATIENT)
Dept: VASCULAR SURGERY | Facility: CLINIC | Age: 67
End: 2022-08-10
Payer: COMMERCIAL

## 2022-08-10 DIAGNOSIS — C22.0 HCC (HEPATOCELLULAR CARCINOMA) (H): Primary | ICD-10-CM

## 2022-08-10 PROCEDURE — 99214 OFFICE O/P EST MOD 30 MIN: CPT | Mod: 95 | Performed by: RADIOLOGY

## 2022-08-10 NOTE — LETTER
Date:August 11, 2022      Provider requested that no letter be sent. Do not send.       [facility]

## 2022-08-10 NOTE — PROGRESS NOTES
"[facility] Vascular      Type of Visit: Virtual: [facility]    Patient is here for a return visit to discuss follow up.     Vitals - Patient Reported  Weight (Patient Reported): 82.6 kg (182 lb)  Height (Patient Reported): 181.6 cm (5' 11.5\")  BMI (Based on Pt Reported Ht/Wt): 25.03  Pain Score: Extreme Pain (8)  Pain Loc: Neck          Questions patient would like addressed today are: Patient verbalized no questions/concerns, this has been communicated to the provider.     Refills are needed: No    How would you like to obtain your AVS? Mikal Stoll  "

## 2022-08-10 NOTE — PROGRESS NOTES
INTERVENTIONAL RADIOLOGY Follow up    Name: Mehdi Martinez  Age: 66 year old       HPI: 66-year-old with cirrhosis complicated by HCC status post radioembolization 12/14/21 and TIPS 8/9/16. He was seen recently on 7/5 to follow up his TARE procedure. His MRI at that time demonstrated good response but presence of a second LIRADS 4 segment 6 lesion. He is here today for evaluation for potential microwave ablation of the lesion with rcent ultrasound to determine visibility/procedural planning.      At the previous visit he had reported issues with confusion, slowed speech, and poor mentation concerning for encephalopathy. Today he is improved and appears to be in improved cognitive state. His wife is present with him.       PAST MEDICAL HISTORY:   Past Medical History:   Diagnosis Date     Alcohol abuse     quit September 2015     Allergic rhinitis      Aortic calcification (H)      Asthma      Cancer (H)      Chronic hepatitis C with cirrhosis (H)      Diabetes mellitus, type II (H)      Diverticulosis      HTN (hypertension)      Portal hypertension (H)      SBP (spontaneous bacterial peritonitis) (H) Jan 2016     Splenomegaly        PAST SURGICAL HISTORY:   Past Surgical History:   Procedure Laterality Date     ARTHROSCOPY SHOULDER  90s     CV CORONARY ANGIOGRAM N/A 1/21/2022    Procedure: CV CORONARY ANGIOGRAM;  Surgeon: Clint Cerda MD;  Location:  [facility]     HC ESOPHAGOSCOPY, DIAGNOSTIC  Jan 2015    small varices, portal HTN gastropathy in the stomach, otherwise normal     HERNIA REPAIR      inguinal     HERNIORRHAPHY UMBILICAL N/A 8/18/2016    Procedure: HERNIORRHAPHY UMBILICAL;  Surgeon: Yadira Lutz MD;  [facility]: [facility]     IR SIRT (SELECTIVE INTERNAL RADIO THERAPY)  12/13/2021     IR VISCERAL ANGIOGRAM  12/13/2021     IR VISCERAL EMBOLIZATION  12/14/2021     KNEE SURGERY  80s     LAPAROSCOPY DIAGNOSTIC (GENERAL) N/A 1/28/2016    Procedure: LAPAROSCOPY  DIAGNOSTIC (GENERAL);  Surgeon: Jeannine Schneider MD;  Location: [facility]     laproscopic cholecystectomy  Nov 2015    liver biopsy performed at the same time       FAMILY HISTORY:   Family History   Problem Relation Age of Onset     Colon Cancer Mother      Ovarian Cancer Sister        SOCIAL HISTORY:   Social History     Tobacco Use     Smoking status: Former Smoker     Types: Cigars     Smokeless tobacco: Never Used   Substance Use Topics     Alcohol use: No     Alcohol/week: 0.0 standard drinks     Comment: Hx of heavy beer drinking, None since Sept 2015       PROBLEM LIST:   Patient Active Problem List    Diagnosis Date Noted     Status post coronary angiogram 01/21/2022     Priority: Medium     HCC (hepatocellular carcinoma) (H) 11/01/2021     Priority: Medium     Umbilical hernia 08/18/2016     Priority: Medium     Alcoholic cirrhosis of liver with ascites (H) 04/16/2016     Priority: Medium     Chronic hepatitis C without hepatic coma (H) 04/06/2016     Priority: Medium     CLL (chronic lymphocytic leukemia) (H) 02/03/2016     Priority: Medium     Cirrhosis (H) 01/19/2016     Priority: Medium     Type II diabetes mellitus (H) 01/18/2014     Priority: Medium     Overview:   Type II or unspecified type diabetes mellitus without mention of complication, not stated as uncontrolled (HRC)         MEDICATIONS:   Prescription Medications as of 8/9/2022       Rx Number Disp Refills Start End Last Dispensed Date Next Fill Date Owning Pharmacy    aMILoride (MIDAMOR) 5 MG tablet  180 tablet 3 1/19/2022    [facility] [place], [place] - [address] [place] [address]    Sig: Take 2 tablets (10 mg) by mouth daily    Class: E-Prescribe    Route: Oral    Bioflavonoid Products (VITAMIN C) CHEW            Class: Historical    Route: Oral    cetirizine (ZYRTEC) 10 MG tablet            Sig: Take 10 mg by mouth Reported on 3/6/2017    Class: Historical    Route: Oral    fluticasone (FLONASE) 50 MCG/ACT nasal spray             Si sprays Reported on 3/6/2017    Class: Historical    folic acid (FOLVITE) 1 MG tablet    2020        Si mg daily    Class: Historical    lactulose encephalopathy (CHRONULAC) 10 GM/15ML SOLUTION  1800 mL 11 2022    [facility] - [place], [place] - [address]    Sig: Take 30 mLs (20 g) by mouth 2 times daily    Class: E-Prescribe    Route: Oral    metFORMIN (GLUCOPHAGE) 1000 MG tablet            Sig: Take 1,000 mg by mouth daily (with dinner)     Class: Historical    Route: Oral    methotrexate 2.5 MG tablet    2020        Si.5 mg once a week 6 tablets once per week    Class: Historical    methylPREDNISolone (MEDROL DOSEPAK) 4 MG tablet therapy pack  21 tablet 0 2021        Sig: Take as directed on package.    Class: Local Print    ondansetron (ZOFRAN) 4 MG tablet  40 tablet 0 2021        Sig: Take 1-2 tablets (4-8 mg) by mouth every 6 hours as needed for nausea (vomiting)    Class: Local Print    Route: Oral    pantoprazole (PROTONIX) 40 MG EC tablet  90 tablet 3 2022    [facility] - [address]    Sig: Take 1 tablet (40 mg) by mouth daily    Class: E-Prescribe    Route: Oral    Pseudoephedrine-APAP  MG TABS            Sig: Take 300 mg by mouth daily     Class: Historical    Route: Oral    rifaximin (XIFAXAN) 550 MG TABS tablet  60 tablet 11 2021    [facility] [address]    Sig: Take 1 tablet (550 mg) by mouth 2 times daily    Class: E-Prescribe    Route: Oral    Prior authorization: Denied    tiZANidine (ZANAFLEX) 4 MG tablet    2020    [facility] [address]    Sig: Take 2-8 mg by mouth    Class: Historical    Route: Oral    zinc sulfate (ZINCATE) 220 MG capsule            Sig: Take 50 mg by mouth daily    Class: Historical    Route: Oral          ALLERGIES:   Artemisia absinthium,  Acetaminophen, Mold, Molds & smuts, Other environmental allergy, Ragweeds, Sulfa drugs, and Terfenadine    ROS:  CONSTITUTIONAL: NEGATIVE for fever, chills, change in weight  ENT/MOUTH: NEGATIVE for ear, mouth and throat problems  RESP: NEGATIVE for significant cough or SOB  CV: NEGATIVE for chest pain, palpitations or peripheral edema    Physical Examination:   VITALS:   There were no vitals taken for this visit.  GENERAL: Healthy, alert and no distress  SKIN: Visible skin clear. No significant rash, abnormal pigmentation or lesions.  PSYCH: Mentation appears normal, affect normal/bright, judgement and insight intact, normal speech and appearance well-groomed.  EYES: Eyes grossly normal to inspection. No discharge or erythema, or obvious scleral/conjunctival abnormalities.  RESP: No audible wheeze, cough, or visible cyanosis. No visible retractions or increased work of breathing.   NEURO: Cranial nerves grossly intact. Mentation and speech appropriate for age.    Labs:    BMP RESULTS:  Lab Results   Component Value Date     07/05/2022     03/18/2021    POTASSIUM 3.5 07/05/2022    POTASSIUM 4.6 03/18/2021    CHLORIDE 110 (H) 07/05/2022    CHLORIDE 114 (H) 03/18/2021    CO2 27 07/05/2022    CO2 25 03/18/2021    ANIONGAP 3 07/05/2022    ANIONGAP 4 03/18/2021     (H) 07/05/2022     (H) 03/18/2021    BUN 8 07/05/2022    BUN 10 03/18/2021    CR 0.59 (L) 07/05/2022    CR 0.70 03/18/2021    GFRESTIMATED >90 07/05/2022    GFRESTIMATED >90 03/18/2021    GFRESTBLACK >90 03/18/2021    JAMES 9.6 07/05/2022    JAMES 9.4 03/18/2021        CBC RESULTS:  Lab Results   Component Value Date    WBC 12.2 (H) 04/19/2022    WBC 22.1 (H) 03/18/2021    RBC 4.95 04/19/2022    RBC 5.03 03/18/2021    HGB 15.0 04/19/2022    HGB 15.3 03/18/2021    HCT 45.9 04/19/2022    HCT 46.4 03/18/2021    MCV 93 04/19/2022    MCV 92 03/18/2021    MCH 30.3 04/19/2022    MCH 30.4 03/18/2021    MCHC 32.7 04/19/2022    MCHC 33.0 03/18/2021     RDW 16.2 (H) 04/19/2022    RDW 14.6 03/18/2021     (L) 04/19/2022     (L) 03/18/2021       INR/PTT:  Lab Results   Component Value Date    INR 1.25 (H) 07/05/2022    INR 1.2 (H) 03/31/2022    INR 1.22 (H) 03/18/2021    PTT 32 12/13/2021    PTT 31 08/19/2016       Diagnostic studies:   Relevant imaging personally reviewed including liver ultrasound which demonstrate that the concerning 12 mmsegment 6 lesion on previous MRI is not sonographically visible.     Assessment/Plan: ECOG 1, BCLC A. 66-year-old with cirrhosis complicated by HCC status post radioembolization 12/14/21 and TIPS 8/9/16 with segment 6 LIRADS 4 lesion on most recent MRI. Upon discussion in tumor board the consensus recommend was to proceed with treatment of this lesion. We feel the best approach for treatment would be microwave ablation. The overview of the procedure, and risks and benefits were explained to the patient. He would like to proceed. We will schedule him and he will require evaluation with the PAC clinic prior to the procedure to be cleared for general anasthesia. Since the lesion is not visible on ultrasound we will plan for the procedure to take place in room 1; if the lesion is not visible on prescan CT will plan for preceding angiography to visualize lesion.  We will plan for room 1 with the combined CT/fluoro room.     I interviewed the patient with the resident and agree with the assessment and plan.     Rohan Lai MD        Video-Visit Details     Type of service:  Video Visit     Video Start and End Time:3:00/3:13    Originating Location (pt. Location): Home     Distant Location (provider location):  [facility]ULAR [facility]IN[facility] [place]      Platform used for Video Visit: [employer]       Patient Care Team:  David Heller as PCP - General (Family Practice)  Calos Hernandes MD as MD (Internal Medicine)  Juancho Willard MD as MD (Gastroenterology)  Marlo Hoang as Physician (Hematology &  Oncology)  Emse Nam MD as Resident (Radiology)  Juancho Willard MD as Assigned Surgical Provider  Evy Tamez RN as Nurse Coordinator (Transplant)  Esme Nam MD as Assigned Heart and Vascular Provider  Buddy Wolfe MD as Assigned Cancer Care Provider  ESME NAM

## 2022-08-10 NOTE — LETTER
8/10/2022       RE: Mehdi Martinez  [address] [facility] [place] [address]     Dear Colleague,    Thank you for referring your patient, Mehdi Martinez, to the [facility] [place] [facility]. Please see a copy of my visit note below.    Please ignore        INTERVENTIONAL RADIOLOGY Follow up    Name: Mehdi Martinez  Age: 66 year old       HPI: 66-year-old with cirrhosis complicated by HCC status post radioembolization 12/14/21 and TIPS 8/9/16. He was seen recently on 7/5 to follow up his TARE procedure. His MRI at that time demonstrated good response but presence of a second LIRADS 4 segment 6 lesion. He is here today for evaluation for potential microwave ablation of the lesion with rcent ultrasound to determine visibility/procedural planning.      At the previous visit he had reported issues with confusion, slowed speech, and poor mentation concerning for encephalopathy. Today he is improved and appears to be in improved cognitive state. His wife is present with him.       PAST MEDICAL HISTORY:   Past Medical History:   Diagnosis Date     Alcohol abuse     quit September 2015     Allergic rhinitis      Aortic calcification (H)      Asthma      Cancer (H)      Chronic hepatitis C with cirrhosis (H)      Diabetes mellitus, type II (H)      Diverticulosis      HTN (hypertension)      Portal hypertension (H)      SBP (spontaneous bacterial peritonitis) (H) Jan 2016     Splenomegaly        PAST SURGICAL HISTORY:   Past Surgical History:   Procedure Laterality Date     ARTHROSCOPY SHOULDER  90s     CV CORONARY ANGIOGRAM N/A 1/21/2022    Procedure: CV CORONARY ANGIOGRAM;  Surgeon: Clint Cerda MD;  Location:  [facility]ARD[facility]ATH LAB     HC ESOPHAGOSCOPY, DIAGNOSTIC  Jan 2015    small varices, portal HTN gastropathy in the stomach, otherwise normal     HERNIA REPAIR      inguinal     HERNIORRHAPHY UMBILICAL N/A 8/18/2016     Procedure: HERNIORRHAPHY UMBILICAL;  Surgeon: Yadira Lutz MD;  [facility]     IR SIRT (SELECTIVE INTERNAL RADIO THERAPY)  12/13/2021     IR VISCERAL ANGIOGRAM  12/13/2021     IR VISCERAL EMBOLIZATION  12/14/2021     KNEE SURGERY  80s     LAPAROSCOPY DIAGNOSTIC (GENERAL) N/A 1/28/2016    Procedure: LAPAROSCOPY DIAGNOSTIC (GENERAL);  Surgeon: Jeannine Schneider MD;  Location: [facility] OR     laproscopic cholecystectomy  Nov 2015    liver biopsy performed at the same time       FAMILY HISTORY:   Family History   Problem Relation Age of Onset     Colon Cancer Mother      Ovarian Cancer Sister        SOCIAL HISTORY:   Social History     Tobacco Use     Smoking status: Former Smoker     Types: Cigars     Smokeless tobacco: Never Used   Substance Use Topics     Alcohol use: No     Alcohol/week: 0.0 standard drinks     Comment: Hx of heavy beer drinking, None since Sept 2015       PROBLEM LIST:   Patient Active Problem List    Diagnosis Date Noted     Status post coronary angiogram 01/21/2022     Priority: Medium     HCC (hepatocellular carcinoma) (H) 11/01/2021     Priority: Medium     Umbilical hernia 08/18/2016     Priority: Medium     Alcoholic cirrhosis of liver with ascites (H) 04/16/2016     Priority: Medium     Chronic hepatitis C without hepatic coma (H) 04/06/2016     Priority: Medium     CLL (chronic lymphocytic leukemia) (H) 02/03/2016     Priority: Medium     Cirrhosis (H) 01/19/2016     Priority: Medium     Type II diabetes mellitus (H) 01/18/2014     Priority: Medium     Overview:   Type II or unspecified type diabetes mellitus without mention of complication, not stated as uncontrolled (HRC)         MEDICATIONS:   Prescription Medications as of 8/9/2022       Rx Number Disp Refills Start End Last Dispensed Date Next Fill Date Owning Pharmacy    aMILoride (MIDAMOR) 5 MG tablet  180 tablet 3 1/19/2022    [facility], [place] - [address] [place] [address]    Sig: Take 2  tablets (10 mg) by mouth daily    Class: E-Prescribe    Route: Oral    Bioflavonoid Products (VITAMIN C) CHEW            Class: Historical    Route: Oral    cetirizine (ZYRTEC) 10 MG tablet            Sig: Take 10 mg by mouth Reported on 3/6/2017    Class: Historical    Route: Oral    fluticasone (FLONASE) 50 MCG/ACT nasal spray            Si sprays Reported on 3/6/2017    Class: Historical    folic acid (FOLVITE) 1 MG tablet    2020        Si mg daily    Class: Historical    lactulose encephalopathy (CHRONULAC) 10 GM/15ML SOLUTION  1800 mL 11 2022    [facility] - [place], [place] - [address]    Sig: Take 30 mLs (20 g) by mouth 2 times daily    Class: E-Prescribe    Route: Oral    metFORMIN (GLUCOPHAGE) 1000 MG tablet            Sig: Take 1,000 mg by mouth daily (with dinner)     Class: Historical    Route: Oral    methotrexate 2.5 MG tablet    2020        Si.5 mg once a week 6 tablets once per week    Class: Historical    methylPREDNISolone (MEDROL DOSEPAK) 4 MG tablet therapy pack  21 tablet 0 2021        Sig: Take as directed on package.    Class: Local Print    ondansetron (ZOFRAN) 4 MG tablet  40 tablet 0 2021        Sig: Take 1-2 tablets (4-8 mg) by mouth every 6 hours as needed for nausea (vomiting)    Class: Local Print    Route: Oral    pantoprazole (PROTONIX) 40 MG EC tablet  90 tablet 3 2022    [facility] [address]    Sig: Take 1 tablet (40 mg) by mouth daily    Class: E-Prescribe    Route: Oral    Pseudoephedrine-APAP  MG TABS            Sig: Take 300 mg by mouth daily     Class: Historical    Route: Oral    rifaximin (XIFAXAN) 550 MG TABS tablet  60 tablet 11 2021    [facility] [address]    Sig: Take 1 tablet (550 mg) by mouth 2 times daily    Class: E-Prescribe    Route: Oral    Prior authorization: Denied    tiZANidine  (ZANAFLEX) 4 MG tablet    6/23/2020    [facility] - [place], [place] - [address] [facility]    Sig: Take 2-8 mg by mouth    Class: Historical    Route: Oral    zinc sulfate (ZINCATE) 220 MG capsule            Sig: Take 50 mg by mouth daily    Class: Historical    Route: Oral          ALLERGIES:   Artemisia absinthium, Acetaminophen, Mold, Molds & smuts, Other environmental allergy, Ragweeds, Sulfa drugs, and Terfenadine    ROS:  CONSTITUTIONAL: NEGATIVE for fever, chills, change in weight  ENT/MOUTH: NEGATIVE for ear, mouth and throat problems  RESP: NEGATIVE for significant cough or SOB  CV: NEGATIVE for chest pain, palpitations or peripheral edema    Physical Examination:   VITALS:   There were no vitals taken for this visit.  GENERAL: Healthy, alert and no distress  SKIN: Visible skin clear. No significant rash, abnormal pigmentation or lesions.  PSYCH: Mentation appears normal, affect normal/bright, judgement and insight intact, normal speech and appearance well-groomed.  EYES: Eyes grossly normal to inspection. No discharge or erythema, or obvious scleral/conjunctival abnormalities.  RESP: No audible wheeze, cough, or visible cyanosis. No visible retractions or increased work of breathing.   NEURO: Cranial nerves grossly intact. Mentation and speech appropriate for age.    Labs:    BMP RESULTS:  Lab Results   Component Value Date     07/05/2022     03/18/2021    POTASSIUM 3.5 07/05/2022    POTASSIUM 4.6 03/18/2021    CHLORIDE 110 (H) 07/05/2022    CHLORIDE 114 (H) 03/18/2021    CO2 27 07/05/2022    CO2 25 03/18/2021    ANIONGAP 3 07/05/2022    ANIONGAP 4 03/18/2021     (H) 07/05/2022     (H) 03/18/2021    BUN 8 07/05/2022    BUN 10 03/18/2021    CR 0.59 (L) 07/05/2022    CR 0.70 03/18/2021    GFRESTIMATED >90 07/05/2022    GFRESTIMATED >90 03/18/2021    GFRESTBLACK >90 03/18/2021    JAMES 9.6 07/05/2022    JAMES 9.4 03/18/2021        CBC RESULTS:  Lab Results   Component  Value Date    WBC 12.2 (H) 04/19/2022    WBC 22.1 (H) 03/18/2021    RBC 4.95 04/19/2022    RBC 5.03 03/18/2021    HGB 15.0 04/19/2022    HGB 15.3 03/18/2021    HCT 45.9 04/19/2022    HCT 46.4 03/18/2021    MCV 93 04/19/2022    MCV 92 03/18/2021    MCH 30.3 04/19/2022    MCH 30.4 03/18/2021    MCHC 32.7 04/19/2022    MCHC 33.0 03/18/2021    RDW 16.2 (H) 04/19/2022    RDW 14.6 03/18/2021     (L) 04/19/2022     (L) 03/18/2021       INR/PTT:  Lab Results   Component Value Date    INR 1.25 (H) 07/05/2022    INR 1.2 (H) 03/31/2022    INR 1.22 (H) 03/18/2021    PTT 32 12/13/2021    PTT 31 08/19/2016       Diagnostic studies:   Relevant imaging personally reviewed including liver ultrasound which demonstrate that the concerning 12 mmsegment 6 lesion on previous MRI is not sonographically visible.     Assessment/Plan: ECOG 1, BCLC A. 66-year-old with cirrhosis complicated by HCC status post radioembolization 12/14/21 and TIPS 8/9/16 with segment 6 LIRADS 4 lesion on most recent MRI. Upon discussion in tumor board the consensus recommend was to proceed with treatment of this lesion. We feel the best approach for treatment would be microwave ablation. The overview of the procedure, and risks and benefits were explained to the patient. He would like to proceed. We will schedule him and he will require evaluation with the PAC clinic prior to the procedure to be cleared for general anasthesia. Since the lesion is not visible on ultrasound we will plan for the procedure to take place in room 1; if the lesion is not visible on prescan CT will plan for preceding angiography to visualize lesion.  We will plan for room 1 with the combined CT/fluoro room.     I interviewed the patient with the resident and agree with the assessment and plan.     Rohan Lai MD        Video-Visit Details     Type of service:  Video Visit     Video Start and End Time:3:00/3:13    Originating Location (pt. Location): Home     Distant  "Location (provider location):  [facility] VASCULAR CLIN[facility] [place]      Platform used for Video Visit: [employer]     CC  Patient Care Team:  David Heller as PCP - General (Family Practice)  Calos Hernandes MD as MD (Internal Medicine)  Juancho Willard MD as MD (Gastroenterology)  Marlo Hoang as Physician (Hematology & Oncology)  Esme Nam MD as Resident (Radiology)  Juancho Willard MD as Assigned Surgical Provider  Evy Tamez RN as Nurse Coordinator (Transplant)  Esme Nam MD as Assigned Heart and Vascular Provider  Buddy Wolfe MD as Assigned Cancer Care Provider  ESME NAM      [facility]ascular      Type of Visit: Virtual: [employer]    Patient is here for a return visit to discuss follow up.     Vitals - Patient Reported  Weight (Patient Reported): 82.6 kg (182 lb)  Height (Patient Reported): 181.6 cm (5' 11.5\")  BMI (Based on Pt Reported Ht/Wt): 25.03  Pain Score: Extreme Pain (8)  Pain Loc: Neck          Questions patient would like addressed today are: Patient verbalized no questions/concerns, this has been communicated to the provider.     Refills are needed: No    How would you like to obtain your AVS? Mikal Stoll      Again, thank you for allowing me to participate in the care of your patient.      Sincerely,    Esme Nam MD      "

## 2022-08-11 ENCOUNTER — TELEPHONE (OUTPATIENT)
Dept: VASCULAR SURGERY | Facility: CLINIC | Age: 67
End: 2022-08-11

## 2022-08-11 NOTE — PATIENT INSTRUCTIONS
You were seen today in the Vascular IR Clinic by Dr Lai for consultation regarding your Hepatocellular Carcinoma.    Plan:    - We will plan to move forward with the CT liver ablation procedure. This will require general anesthesia and we are currently looking into September for scheduling. You will be required to make a pre-anesthesia clinic appointment within 30 days of the scheduled CT liver ablation. I will reach out to you to discuss possible dates.     Please call or send a MyChart with any questions or concerns.    Zahira MCHUGH RN, BSN   Interventional Radiology [facility]   [phone]

## 2022-08-11 NOTE — TELEPHONE ENCOUNTER
Called and spoke with Pt's wife, Emmie, in regards to Pt's liver ablation.  Provided her with date and time.  Discussed need for PAC Clinic visit and provided her with scheduling telephone and noted appt must be within 30 days prior to procedure. Discussed medications needing to be held. Noted mychart with date, time, location and full prep would be sent to Pt's mychart.  Felicia agreed and stated she would review in full and contact clinic with any questions or concerns. Felicia verbalized understanding, agreed to current plan and denied any further questions.    Mavis Wood LPN

## 2022-08-12 NOTE — TELEPHONE ENCOUNTER
FUTURE VISIT INFORMATION      SURGERY INFORMATION:    Date: 22    Location: [facility] or    Surgeon:  Rohan Lai MD    Anesthesia Type:  general    Procedure: ANESTHESIA OUT OF OR COMPUTED TOMOGRAPHY LIVER ABLATION WITH POSSIBLE ANGIOGRAM@0800    Consult: virtual visit 8/10    RECORDS REQUESTED FROM:       Primary Care Provider: David Heller    Most recent EKG+ Tracin22- [facility]    Most recent Cardiac Stress Test: 21    Most recent Coronary Angiogram: 22

## 2022-08-15 ENCOUNTER — TELEPHONE (OUTPATIENT)
Dept: GASTROENTEROLOGY | Facility: CLINIC | Age: 67
End: 2022-08-15

## 2022-08-15 DIAGNOSIS — K76.82 HEPATIC ENCEPHALOPATHY (H): ICD-10-CM

## 2022-08-15 NOTE — TELEPHONE ENCOUNTER
[facility] Call Center    Phone Message    May a detailed message be left on voicemail: yes     Reason for Call: Medication Refill Request    Has the patient contacted the pharmacy for the refill? Yes     Name of medication being requested: rifaximin (XIFAXAN) 550 MG TABS tablet    Provider who prescribed the medication: Dr. Juancho Willard    Pharmacy: [facility]    Date medication is needed: Soon     Action Taken: Message routed to:  [facility] Hepatology [facility] [place]    Travel Screening: Not Applicable

## 2022-09-06 LAB
ABO/RH(D): NORMAL
ANTIBODY SCREEN: NEGATIVE
SPECIMEN EXPIRATION DATE: NORMAL

## 2022-09-07 ENCOUNTER — PRE VISIT (OUTPATIENT)
Dept: SURGERY | Facility: CLINIC | Age: 67
End: 2022-09-07

## 2022-09-07 ENCOUNTER — ANESTHESIA EVENT (OUTPATIENT)
Dept: SURGERY | Facility: CLINIC | Age: 67
End: 2022-09-07

## 2022-09-07 ENCOUNTER — LAB (OUTPATIENT)
Dept: LAB | Facility: CLINIC | Age: 67
End: 2022-09-07
Payer: COMMERCIAL

## 2022-09-07 ENCOUNTER — OFFICE VISIT (OUTPATIENT)
Dept: SURGERY | Facility: CLINIC | Age: 67
End: 2022-09-07
Payer: COMMERCIAL

## 2022-09-07 VITALS
HEIGHT: 70 IN | OXYGEN SATURATION: 97 % | HEART RATE: 86 BPM | TEMPERATURE: 98.1 F | SYSTOLIC BLOOD PRESSURE: 159 MMHG | RESPIRATION RATE: 16 BRPM | WEIGHT: 178 LBS | DIASTOLIC BLOOD PRESSURE: 78 MMHG | BODY MASS INDEX: 25.48 KG/M2

## 2022-09-07 DIAGNOSIS — Z01.818 PREOP EXAMINATION: Primary | ICD-10-CM

## 2022-09-07 DIAGNOSIS — C22.0 HEPATOCELLULAR CARCINOMA (H): ICD-10-CM

## 2022-09-07 LAB
ALBUMIN SERPL BCG-MCNC: 3.5 G/DL (ref 3.5–5.2)
ALP SERPL-CCNC: 233 U/L (ref 40–129)
ALT SERPL W P-5'-P-CCNC: 26 U/L (ref 10–50)
ANION GAP SERPL CALCULATED.3IONS-SCNC: 13 MMOL/L (ref 7–15)
AST SERPL W P-5'-P-CCNC: 73 U/L (ref 10–50)
BILIRUB SERPL-MCNC: 1.6 MG/DL
BUN SERPL-MCNC: 10.7 MG/DL (ref 8–23)
CALCIUM SERPL-MCNC: 9.5 MG/DL (ref 8.8–10.2)
CHLORIDE SERPL-SCNC: 105 MMOL/L (ref 98–107)
CREAT SERPL-MCNC: 0.6 MG/DL (ref 0.67–1.17)
DEPRECATED HCO3 PLAS-SCNC: 21 MMOL/L (ref 22–29)
ERYTHROCYTE [DISTWIDTH] IN BLOOD BY AUTOMATED COUNT: 16.6 % (ref 10–15)
GFR SERPL CREATININE-BSD FRML MDRD: >90 ML/MIN/1.73M2
GLUCOSE SERPL-MCNC: 200 MG/DL (ref 70–99)
HCT VFR BLD AUTO: 41 % (ref 40–53)
HGB BLD-MCNC: 13.7 G/DL (ref 13.3–17.7)
MCH RBC QN AUTO: 31.6 PG (ref 26.5–33)
MCHC RBC AUTO-ENTMCNC: 33.4 G/DL (ref 31.5–36.5)
MCV RBC AUTO: 95 FL (ref 78–100)
PLATELET # BLD AUTO: 98 10E3/UL (ref 150–450)
POTASSIUM SERPL-SCNC: 4.4 MMOL/L (ref 3.4–5.3)
PROT SERPL-MCNC: 5.9 G/DL (ref 6.4–8.3)
RBC # BLD AUTO: 4.33 10E6/UL (ref 4.4–5.9)
SODIUM SERPL-SCNC: 139 MMOL/L (ref 136–145)
WBC # BLD AUTO: 10.2 10E3/UL (ref 4–11)

## 2022-09-07 PROCEDURE — 80053 COMPREHEN METABOLIC PANEL: CPT | Performed by: PATHOLOGY

## 2022-09-07 PROCEDURE — 85027 COMPLETE CBC AUTOMATED: CPT | Performed by: PATHOLOGY

## 2022-09-07 PROCEDURE — 86850 RBC ANTIBODY SCREEN: CPT | Performed by: NURSE PRACTITIONER

## 2022-09-07 PROCEDURE — 36415 COLL VENOUS BLD VENIPUNCTURE: CPT | Performed by: PATHOLOGY

## 2022-09-07 PROCEDURE — 99205 OFFICE O/P NEW HI 60 MIN: CPT | Performed by: NURSE PRACTITIONER

## 2022-09-07 PROCEDURE — 86901 BLOOD TYPING SEROLOGIC RH(D): CPT | Performed by: NURSE PRACTITIONER

## 2022-09-07 ASSESSMENT — PAIN SCALES - GENERAL: PAINLEVEL: MODERATE PAIN (5)

## 2022-09-07 ASSESSMENT — LIFESTYLE VARIABLES: TOBACCO_USE: 1

## 2022-09-07 NOTE — H&P
Pre-Operative H & P     CC:  Preoperative exam to assess for increased cardiopulmonary risk while undergoing surgery and anesthesia.    Date of Encounter: 9/7/2022  Primary Care Physician:  David Heller     Reason for visit:   Encounter Diagnoses   Name Primary?     Hepatocellular carcinoma (H)      Preop examination Yes       HPI  Mehdi Martinez is a 66 year old male who presents for pre-operative H & P in preparation for  Procedure Information     Date/Time: 9/27/22     Procedure: ANESTHESIA OUT OF OR COMPUTED TOMOGRAPHY LIVER ABLATION WITH POSSIBLE ANGIOGRAM@0800    Anesthesia type: general    Pre-op diagnosis: HCC    Location: [facility]    Providers: Ming Martinez has a past medical history for minimal non-obstructive CAD, asthma, former smoker, DMII, GERD, previous alcohol dependence sober since 2015 and liver cirrhosis complicated by hepatocellular carcinoma.     Mr. 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  On the patient s most recent MRI, there appears to be a second LIRADS 4 segment 6 lesion.  His case was discussed at tumor board with the consensus recommendation to proceed with microwave ablation treatment of this lesion.  Mr. Martinez presents to the PAC in-person today with his wife, Felicia, in preparation for the above procedure.      Of significance, he was hospitalized at the [facility] from 8/24-8/29/22 for generalized weakness 2/2 to COVID 19 pneumonia.  Based on his immunocompromised state with rheumatoid arthritis and diabetes, he was started on remdesivir 3 day protocol.  His stay was complicated with hospital delirium but resolved and was discharged on 8/29/2022 in stable condition. Today he denies any residual symptoms.      History is obtained from the patient, electronic health  record and patient's spouse and chart review    Hx of abnormal bleeding or anti-platelet use: denies      Past Medical History  Past Medical History:   Diagnosis Date     Alcohol abuse     quit September 2015     Allergic rhinitis      Aortic calcification (H)      Asthma      Cancer (H)      Chronic hepatitis C with cirrhosis (H)      Diabetes mellitus, type II (H)      Diverticulosis      HTN (hypertension)      Portal hypertension (H)      SBP (spontaneous bacterial peritonitis) (H) Jan 2016     Splenomegaly        Past Surgical History  Past Surgical History:   Procedure Laterality Date     ARTHROSCOPY SHOULDER  90s     CV CORONARY ANGIOGRAM N/A 1/21/2022    Procedure: CV CORONARY ANGIOGRAM;  Surgeon: Clint Cerda MD;  Location: [facility]     HC ESOPHAGOSCOPY, DIAGNOSTIC  Jan 2015    small varices, portal HTN gastropathy in the stomach, otherwise normal     HERNIA REPAIR      inguinal     HERNIORRHAPHY UMBILICAL N/A 8/18/2016    Procedure: HERNIORRHAPHY UMBILICAL;  Surgeon: Yadira Lutz MD;  Location: [facility] OR     IR SIRT (SELECTIVE INTERNAL RADIO THERAPY)  12/13/2021     IR VISCERAL ANGIOGRAM  12/13/2021     IR VISCERAL EMBOLIZATION  12/14/2021     KNEE SURGERY  80s     LAPAROSCOPY DIAGNOSTIC (GENERAL) N/A 1/28/2016    Procedure: LAPAROSCOPY DIAGNOSTIC (GENERAL);  Surgeon: Jeannine Schneider MD;  Location: [facility] OR     laproscopic cholecystectomy  Nov 2015    liver biopsy performed at the same time       Prior to Admission Medications  Current Outpatient Medications   Medication Sig Dispense Refill     aMILoride (MIDAMOR) 5 MG tablet Take 2 tablets (10 mg) by mouth daily (Patient taking differently: Take 10 mg by mouth every morning) 180 tablet 3     Bioflavonoid Products (VITAMIN C) CHEW Take by mouth every morning       fluticasone (FLONASE) 50 MCG/ACT nasal spray 2 sprays Reported on 3/6/2017       folic acid (FOLVITE) 1 MG tablet 1 mg every morning        lactulose encephalopathy (CHRONULAC) 10 GM/15ML SOLUTION Take 30 mLs (20 g) by mouth 2 times daily 1800 mL 11     metFORMIN (GLUCOPHAGE) 1000 MG tablet Take 1,000 mg by mouth daily (with dinner)        methotrexate 2.5 MG tablet 2.5 mg once a week 6 tablets once per week, typically on Friday       rifaximin (XIFAXAN) 550 MG TABS tablet Take 1 tablet (550 mg) by mouth 2 times daily 60 tablet 11     tiZANidine (ZANAFLEX) 4 MG tablet Take 2-8 mg by mouth as needed       zinc sulfate (ZINCATE) 220 MG capsule Take 50 mg by mouth every morning       cetirizine (ZYRTEC) 10 MG tablet Take 10 mg by mouth Reported on 3/6/2017 (Patient not taking: No sig reported)       methylPREDNISolone (MEDROL DOSEPAK) 4 MG tablet therapy pack Take as directed on package. 21 tablet 0     ondansetron (ZOFRAN) 4 MG tablet Take 1-2 tablets (4-8 mg) by mouth every 6 hours as needed for nausea (vomiting) (Patient not taking: Reported on 9/7/2022) 40 tablet 0     pantoprazole (PROTONIX) 40 MG EC tablet Take 1 tablet (40 mg) by mouth daily (Patient not taking: Reported on 9/7/2022) 90 tablet 3     Pseudoephedrine-APAP  MG TABS Take 300 mg by mouth daily  (Patient not taking: No sig reported)         Allergies  Allergies   Allergen Reactions     Artemisia Absinthium Difficulty breathing     Acetaminophen Other (See Comments)     Other reaction(s): Avoids due to cirrhosis, hepatitis C  Avoids acetaminophen due to cirrhosis, hepatitis c       Mold      Molds & Smuts      Other reaction(s): Other, see comments  No reaction documented     Other Environmental Allergy Other (See Comments)     Ragweed, no reactions were documented     Ragweeds      Sulfa Drugs Itching     Terfenadine Itching     Itchy rash       Social History  Social History     Socioeconomic History     Marital status:      Spouse name: Not on file     Number of children: Not on file     Years of education: Not on file     Highest education level: Not on file   Occupational  History     Not on file   Tobacco Use     Smoking status: Former Smoker     Types: Cigars     Smokeless tobacco: Never Used   Substance and Sexual Activity     Alcohol use: No     Alcohol/week: 0.0 standard drinks     Comment: Hx of heavy beer drinking, None since Sept 2015     Drug use: No     Sexual activity: Not on file   Other Topics Concern     Parent/sibling w/ CABG, MI or angioplasty before 65F 55M? Not Asked   Social History Narrative     Not on file     Social Determinants of Health     Financial Resource Strain: Not on file   Food Insecurity: Not on file   Transportation Needs: Not on file   Physical Activity: Not on file   Stress: Not on file   Social Connections: Not on file   Intimate Partner Violence: Not on file   Housing Stability: Not on file       Family History  Family History   Problem Relation Age of Onset     Colon Cancer Mother      Chronic Obstructive Pulmonary Disease Father      Cancer Sister      Ovarian Cancer Sister        Review of Systems  The complete review of systems is negative other than noted in the HPI or here.   Anesthesia Evaluation   Pt has had prior anesthetic. Type: General and MAC.    No history of anesthetic complications       ROS/MED HX  ENT/Pulmonary:     (+) DAISY risk factors, hypertension, allergic rhinitis, tobacco use, Past use, Intermittent, asthma (allergy induced.  ) Treatment: Inhaler prn,      Neurologic:  - neg neurologic ROS     Cardiovascular: Comment: Denies cardiac symptoms including chest pain, SOB, palpitations, syncope, ALBERT, orthopnea, or PND.      (+) hypertension-----Previous cardiac testing   Echo: Date: Results:    Stress Test: Date: Results:    ECG Reviewed: Date: 2022 Results:    Cath: Date: 2022 Results:      METS/Exercise Tolerance:  Comment: METS<4.  Wife helps him get dressed.    Hematologic: Comments: Chronic lymphocytic leukemia follows with Dr. Hoang with last visit on 8/17/22. Thrombocytopenia with Plts ~87K.  Considered Stable and no  "treatment at this point. - neg hematologic  ROS     Musculoskeletal: Comment: [place], [place], sustained aC7 closed fracture and closed fractured right scapular 3/15/2022 2/2 fall. Now with limited neck extension.     S/P thoracic spinal fusion.    (+) arthritis (s/p b/l PETTY. OA in hands. ),     GI/Hepatic: Comment:  hepatic portal shunt place for portal HTN    (+) GERD, Asymptomatic on medication, hepatitis type C, liver disease,     Renal/Genitourinary:       Endo:     (+) type II DM, Not using insulin, - not using insulin pump. Normal glucose range: does not test. , not previously admitted for DM/DKA.     Psychiatric/Substance Use:     (+) alcohol abuse  (-) chronic opioid use historyPsychiatric history: Sober since 2015.   Infectious Disease: Comment: COVID 19 (+) about three weeks ago reporting symptoms of fever, cough and tired. Hospitalized at [facility] (8/24/2022) with records indicating \"COVID Pneumonia\".  Treated with Remdesivir 3 day protocol.   Denies any residual symptoms.   Completed COVID vaccines.       Malignancy:   (+) Malignancy (\"Stable\" followed by Dr. Mcgill), History of Lymphoma/Leukemia and Other.Lymph CA Active status post.  Other CA HCC s/p ablation. Active status post.    Other:      (+) , other significant disability (Uses a cane or roller. ) Other (comment),          BP (!) 159/78 (BP Location: Right arm, Patient Position: Sitting, Cuff Size: Adult Regular)   Pulse 86   Temp 98.1  F (36.7  C) (Oral)   Resp 16   Ht 1.778 m (5' 10\")   Wt 80.7 kg (178 lb)   SpO2 97%   BMI 25.54 kg/m      Physical Exam   Constitutional: Awake, alert, cooperative, no apparent distress, and appears stated age.  Eyes: Pupils equal, round and reactive to light, extra ocular muscles intact, sclera clear, conjunctiva normal.  HENT: Normocephalic, oral pharynx with moist mucus membranes, dentition in fair repair. No goiter appreciated. "   Respiratory: Clear to auscultation bilaterally, no crackles or wheezing.  Cardiovascular: Regular rate and rhythm, normal S1 and S2, and no murmur noted.  Carotids +2, no bruits. No edema. Palpable pulses to radial  DP and PT arteries.   GI: Normal bowel sounds, soft, non-distended, non-tender, no masses palpated, no hepatosplenomegaly.  Surgical scars: well healed  Lymph/Hematologic: No cervical lymphadenopathy and no supraclavicular lymphadenopathy.  Genitourinary:  deferred  Skin: Warm and dry.  No rashes at anticipated surgical site.   Musculoskeletal: Limited neck extension. There is no redness, warmth, or swelling of the joints. Gross motor strength is normal.    Neurologic: Awake, alert, oriented to name, place and time. Cranial nerves II-XII are grossly intact. Altered gait.  Neuropsychiatric: Calm, cooperative. Normal affect.     Prior Labs/Diagnostic Studies   All labs and imaging personally reviewed   Lab Results   Component Value Date    WBC 12.2 04/19/2022    WBC 22.1 03/18/2021     Lab Results   Component Value Date    RBC 4.95 04/19/2022    RBC 5.03 03/18/2021     Lab Results   Component Value Date    HGB 15.0 04/19/2022    HGB 15.3 03/18/2021     Lab Results   Component Value Date    HCT 45.9 04/19/2022    HCT 46.4 03/18/2021     Lab Results   Component Value Date    MCV 93 04/19/2022    MCV 92 03/18/2021     Lab Results   Component Value Date    MCH 30.3 04/19/2022    MCH 30.4 03/18/2021     Lab Results   Component Value Date    MCHC 32.7 04/19/2022    MCHC 33.0 03/18/2021     Lab Results   Component Value Date    RDW 16.2 04/19/2022    RDW 14.6 03/18/2021     Lab Results   Component Value Date     04/19/2022     03/18/2021       Last Comprehensive Metabolic Panel:  Sodium   Date Value Ref Range Status   09/07/2022 139 136 - 145 mmol/L Final   03/18/2021 143 133 - 144 mmol/L Final     Potassium   Date Value Ref Range Status   09/07/2022 4.4 3.4 - 5.3 mmol/L Final   07/05/2022 3.5 3.4  - 5.3 mmol/L Final   03/18/2021 4.6 3.4 - 5.3 mmol/L Final     Chloride   Date Value Ref Range Status   09/07/2022 105 98 - 107 mmol/L Final   07/05/2022 110 (H) 94 - 109 mmol/L Final   03/18/2021 114 (H) 94 - 109 mmol/L Final     Carbon Dioxide   Date Value Ref Range Status   03/18/2021 25 20 - 32 mmol/L Final     Carbon Dioxide (CO2)   Date Value Ref Range Status   09/07/2022 21 (L) 22 - 29 mmol/L Final   07/05/2022 27 20 - 32 mmol/L Final     Anion Gap   Date Value Ref Range Status   09/07/2022 13 7 - 15 mmol/L Final   07/05/2022 3 3 - 14 mmol/L Final   03/18/2021 4 3 - 14 mmol/L Final     Glucose   Date Value Ref Range Status   09/07/2022 200 (H) 70 - 99 mg/dL Final   07/05/2022 128 (H) 70 - 99 mg/dL Final   03/18/2021 117 (H) 70 - 99 mg/dL Final     Urea Nitrogen   Date Value Ref Range Status   09/07/2022 10.7 8.0 - 23.0 mg/dL Final   07/05/2022 8 7 - 30 mg/dL Final   03/18/2021 10 7 - 30 mg/dL Final     Creatinine   Date Value Ref Range Status   09/07/2022 0.60 (L) 0.67 - 1.17 mg/dL Final   03/18/2021 0.70 0.66 - 1.25 mg/dL Final     GFR Estimate   Date Value Ref Range Status   09/07/2022 >90 >60 mL/min/1.73m2 Final     Comment:     Effective December 21, 2021 eGFRcr in adults is calculated using the 2021 CKD-EPI creatinine equation which includes age and gender (Silver et al., NEJM, DOI: 10.1056/USNJyt8923950)   03/18/2021 >90 >60 mL/min/[1.73_m2] Final     Comment:     Non  GFR Calc  Starting 12/18/2018, serum creatinine based estimated GFR (eGFR) will be   calculated using the Chronic Kidney Disease Epidemiology Collaboration   (CKD-EPI) equation.       Calcium   Date Value Ref Range Status   09/07/2022 9.5 8.8 - 10.2 mg/dL Final   03/18/2021 9.4 8.5 - 10.1 mg/dL Final       Hemoglobin A1C   Date Value Ref Range Status   11/30/2021 6.4 (H) 0.0 - 5.6 % Final     Comment:     Normal <5.7%   Prediabetes 5.7-6.4%    Diabetes 6.5% or higher     Note: Adopted from ADA consensus guidelines.    01/21/2016 5.6 4.3 - 6.0 % Final     PROCEDURES   EXAM: XR PORTABLE CHEST 1 VIEW   LOCATION: [facility]   DATE/TIME: 8/24/2022 5:10 PM     INDICATION: Fever, weakness   COMPARISON: Chest radiograph 08/02/2022     IMPRESSION: Stable cardiomediastinal silhouette. No focal airspace disease, pleural effusion or pneumothorax. Skinfold overlying the lateral left lung. No acute bony abnormality.      ECG 8/2022     Sinus tachycardia      Otherwise normal ECG      When compared with ECG of 18-AUG-2016 14:28,      Minimal criteria for Anteroseptal infarct are no longer Present      Nonspecific T wave abnormality no longer evident in Anterior leads      Confirmed by Augustus Mckinney ([id number]) on 8/8/2022          CT Cervical spine 6/2022 IMPRESSION:      1.  Further interval healing of the fractures involving the C7-T1 vertebral bodies.      2. Possible start of partial ankylosis across the C7-T1 disc space level.      3.  Stable diffuse degenerative changes of the cervical spine with canal compromise and neural foraminal narrowing visualized.     Coronary angiogram 2022      Indication: Part of liver transplant work-up     Conclusion      Minimal non obstructive CAD.     DSE 2021     Interpretation Summary     Normal, low-risk dobutamine echocardiogram without evidence of ischemia. The     estimated PA systolic pressure is 12 mmHg.     87% of the maximum predicted heart rate was achieved (target is 85%).     Normal biventricular size, thickness, and global systolic function at     baseline.     With low-dose dobutamine, LVEF augmented and LV cavity size decreased     appropriately.     With peak dobutamine, LVEF increased further to >70% and LV cavity size     decreased appropriately.     No regional wall motion abnormalities at rest or with dobutamine.     The test was terminated due to the achievement of target heart rate.     No angina was elicited.     No ECG evidence of ischemia. Normal heart rate and  blood pressure response to     dobutamine. Occasional PVCs during stress.     No significant valvular abnormalities are noted on screening Doppler exam.     The aortic root and visualized ascending aorta are normal.     No prior stress test was available for comparison         The patient's records and results personally reviewed by this provider.     Outside records reviewed from: Care Everywhere    LAB/DIAGNOSTIC STUDIES TODAY:    Lab Results   Component Value Date    WBC 10.2 09/07/2022    WBC 22.1 03/18/2021     Lab Results   Component Value Date    RBC 4.33 09/07/2022    RBC 5.03 03/18/2021     Lab Results   Component Value Date    HGB 13.7 09/07/2022    HGB 15.3 03/18/2021     Lab Results   Component Value Date    HCT 41.0 09/07/2022    HCT 46.4 03/18/2021     Lab Results   Component Value Date    MCV 95 09/07/2022    MCV 92 03/18/2021     Lab Results   Component Value Date    MCH 31.6 09/07/2022    MCH 30.4 03/18/2021     Lab Results   Component Value Date    MCHC 33.4 09/07/2022    MCHC 33.0 03/18/2021     Lab Results   Component Value Date    RDW 16.6 09/07/2022    RDW 14.6 03/18/2021     Lab Results   Component Value Date    PLT 98 09/07/2022     03/18/2021       Last Comprehensive Metabolic Panel:  Sodium   Date Value Ref Range Status   09/07/2022 139 136 - 145 mmol/L Final   03/18/2021 143 133 - 144 mmol/L Final     Potassium   Date Value Ref Range Status   09/07/2022 4.4 3.4 - 5.3 mmol/L Final   07/05/2022 3.5 3.4 - 5.3 mmol/L Final   03/18/2021 4.6 3.4 - 5.3 mmol/L Final     Chloride   Date Value Ref Range Status   09/07/2022 105 98 - 107 mmol/L Final   07/05/2022 110 (H) 94 - 109 mmol/L Final   03/18/2021 114 (H) 94 - 109 mmol/L Final     Carbon Dioxide   Date Value Ref Range Status   03/18/2021 25 20 - 32 mmol/L Final     Carbon Dioxide (CO2)   Date Value Ref Range Status   09/07/2022 21 (L) 22 - 29 mmol/L Final   07/05/2022 27 20 - 32 mmol/L Final     Anion Gap   Date Value Ref Range Status    09/07/2022 13 7 - 15 mmol/L Final   07/05/2022 3 3 - 14 mmol/L Final   03/18/2021 4 3 - 14 mmol/L Final     Glucose   Date Value Ref Range Status   09/07/2022 200 (H) 70 - 99 mg/dL Final   07/05/2022 128 (H) 70 - 99 mg/dL Final   03/18/2021 117 (H) 70 - 99 mg/dL Final     Urea Nitrogen   Date Value Ref Range Status   09/07/2022 10.7 8.0 - 23.0 mg/dL Final   07/05/2022 8 7 - 30 mg/dL Final   03/18/2021 10 7 - 30 mg/dL Final     Creatinine   Date Value Ref Range Status   09/07/2022 0.60 (L) 0.67 - 1.17 mg/dL Final   03/18/2021 0.70 0.66 - 1.25 mg/dL Final     GFR Estimate   Date Value Ref Range Status   09/07/2022 >90 >60 mL/min/1.73m2 Final     Comment:     Effective December 21, 2021 eGFRcr in adults is calculated using the 2021 CKD-EPI creatinine equation which includes age and gender (Silver et al., NEJM, DOI: 10.1056/QNVHqf9683091)   03/18/2021 >90 >60 mL/min/[1.73_m2] Final     Comment:     Non  GFR Calc  Starting 12/18/2018, serum creatinine based estimated GFR (eGFR) will be   calculated using the Chronic Kidney Disease Epidemiology Collaboration   (CKD-EPI) equation.       Calcium   Date Value Ref Range Status   09/07/2022 9.5 8.8 - 10.2 mg/dL Final   03/18/2021 9.4 8.5 - 10.1 mg/dL Final       Lab Results   Component Value Date    AST 73 09/07/2022    AST 84 03/18/2021     Lab Results   Component Value Date    ALT 26 09/07/2022    ALT 70 03/18/2021     No results found for: BILICONJ   Lab Results   Component Value Date    BILITOTAL 1.6 09/07/2022    BILITOTAL 1.4 03/18/2021     Lab Results   Component Value Date    ALBUMIN 3.5 09/07/2022    ALBUMIN 3.4 07/05/2022    ALBUMIN 3.4 03/18/2021     Lab Results   Component Value Date    PROTTOTAL 5.9 09/07/2022    PROTTOTAL 6.1 03/18/2021      Lab Results   Component Value Date    ALKPHOS 233 09/07/2022    ALKPHOS 189 03/18/2021         Assessment  Mehdi Martinez is a 66 year old male seen as a PAC referral for risk assessment and optimization  for anesthesia.    Plan/Recommendations  Pt will be optimized for the proposed procedure.  See below for details on the assessment, risk, and preoperative recommendations    NEUROLOGY  - No history of TIA, CVA or seizure  -Post Op delirium risk factors:  History of prior delirium    ENT  - Very limited neck extension since sustaing C& closed fracture this spring. Recommend difficult airway tray.   Mallampati: II  TM: > 3       CARDIAC  - Minimal non-obstructive CAD on coronary angiogram (2022)..  Denies cardiac symptoms.  - HTN treated with amloride.  Hold DOS.   - METS (Metabolic Equivalents)<4  - RCRI-Very low risk: Class 1 0.4% complication rate            Total Score: 0        PULMONARY  - Hospitalized at [facility] from  8/24-8/29/22 for generalized weakness 2/2 to COVID 19 pneumonia.  Based on his immunocompromised state with rheumatoid arthritis and diabetes, he was started on remdesivir 3 day protocol.  His stay was complicated with hospital delirium but resolved and was discharged on 8/29/2022 in stable condition. Today he denies any residual symptoms.    ~ Per [facility] COVID (+) delay of surgery protocol:  **Time sensitive,  2-6 week delay per surgeon.    **Elective surgery , six week delay.     ~ Will notify surgeon.      DAISY Medium Risk            Total Score: 4    DAISY: Often tired    DAISY: Hypertension    DAISY: Over 50 ys old    DAISY: Male      - Asthma, allergy induced.  Well controlled on allergy medicine.  Rarely uses albuterol inhaler.   - Tobacco History  History   Smoking Status     Former Smoker     Types: Cigars   Smokeless Tobacco     Never Used       GI  -  Cirrhosis caused by hepatitis C complicated by HCC status post radioembolization 12/14/21 and TIPS 8/9/16. Most recent MRI, there appears to be a second LIRADS 4 segment 6 lesion.  His case was discussed at tumor board with the consensus recommendation to proceed with microwave ablation treatment of this lesion.    - GERD  Controlled on  "medications: Proton Pump Inhibitor  PONV Low Risk  Total Score: 1           1 AN PONV: Patient is not a current smoker        /RENAL  - Baseline Creatinine  See above     ENDOCRINE    - BMI: Estimated body mass index is 25.54 kg/m  as calculated from the following:    Height as of this encounter: 1.778 m (5' 10\").    Weight as of this encounter: 80.7 kg (178 lb).  Overweight (BMI 25.0-29.9)  - Diabetes Mellitus, Type II, non-insulin dependent.  Hold morning oral hypoglycemic medications. Recommend close monitoring of the patient's blood glucose levels throughout the perioperative period.    HEME  - Chronic lymphocytic leukemia followed by Dr. Hoang with last visit on 8/17/2022.  Appears to be stable  - Thrombocytopenia with plts 98K today.   VTE High Risk 3%            Total Score: 8    VTE: Greater than 59 yrs old    VTE: Male    VTE: Current cancer      - No history of abnormal bleeding or antiplatelet use.    MSK  - C7 closed fracture and closed fractured right scapular 3/15/2022 2/2 traumatic fall with limited neck extension. Consideration for careful positioning.     IMMUNE  - Rheumatoid arthritis treated with weekly methotrexate also on folic acid    PSYCH  - h/o alcohol dependence, sober since 2015.    Patient was discussed with Dr David    The patient is optimized for their procedure. AVS with information on surgery time/arrival time, meds and NPO status given by nursing staff. No further diagnostic testing indicated.      On the day of service:     Prep time: 20 minutes  Visit time: 20 minutes  Documentation time: 20 minutes  ------------------------------------------  Total time: 60 minutes      TAMEKA Araya CNP  Preoperative Assessment Center  [facility]  Phone: [phone]  Fax: [phone]  "

## 2022-09-07 NOTE — PATIENT INSTRUCTIONS
Preparing for Your Surgery      Name:  Mehdi Martinez   MRN:  [id number]   :  1955   Today's Date:  2022       Arriving for surgery:  Surgery date:  22  Arrival time:  6:00 am       Visiting hours: 8 a.m. to 8:30 p.m.     Hospital: Adult patients and children (under age 18 can have 4 visitor at a time     No visitors under the age of 5 are allowed for hospital patients.     Surgeries and procedures: Adult patients can have 2 visitors all through the surgery process.     Patients with disabilities: Can have a support person with them (family member, service provider     Or someone well informed about their needs) plus the allowed number of visitors     Patients confirmed or suspected to have symptoms of COVID 19 or flu:     No visitors allowed for adult patients.   Children (under age 18) can have 1 named visitor.     People who are sick or showing symptoms of COVID 19 or flu:    Are not allowed to visit patients--we can only make exceptions in special situations.       Please follow these guidelines for your visit:   Arrive wearing a mask over your mouth and nose; we will give you a medical mask to wear    If you arrive wearing a cloth mask.   Keep it on during your entire visit, even when in patient's room.   If you don't wear a mask we'll ask you to leave.     Clean your hands with alcohol hand . Do this when you arrive at and leave the building and patient room,    And again after you touch your mask or anything in the room.     You can t visit if you have a fever, cough, shortness of breath, muscle aches, headaches, sore throat    Or diarrhea      Stay 6 feet away from others during your visit and between visits     Go directly to and from the room you are visiting.     Stay in the patient s room during your visit. Limit going to other places in the hospital as much as possible     Leave bags and jackets at home or in the car.     For everyone s health, please don t come and go  during your visit. That includes for smoking   during your visit. That includes for smoking    Please come to:     [facility] [address]  [address] [place]  [address]    - ? parking is available in front of the hospital      -    Please proceed to [address] on the 3rd floor. [phone]?     - ?If you are in need of directions, wheelchair or escort please stop at the Information Desk in the lobby.  Inform the information person that you are here for surgery; a wheelchair and escort to [address] will be provided.?     What can I eat or drink?  -  You may eat and drink normally for up to 8 hours before your surgery. (Until 12:00 am)  -  You may have clear liquids until 2 hours before surgery. (Until 6:00 am)    Examples of clear liquids:  Water  Clear broth  Juices (apple, white grape, white cranberry  and cider) without pulp  Noncarbonated, powder based beverages  (lemonade and Nathaniel-Aid)  Sodas (Sprite, 7-Up, ginger ale and seltzer)  Coffee or tea (without milk or cream)  Gatorade    -  No Alcohol for at least 24 hours before surgery.     Which medicines can I take?    Hold Aspirin for 7 days before surgery.   Hold Multivitamins for 7 days before surgery.  Hold Supplements for 7 days before surgery.  Hold Ibuprofen (Advil, Motrin) for 1 day before surgery--unless otherwise directed by surgeon.  Hold Naproxen (Aleve) for 4 days before surgery.    Hold all NSAIDS for 7 days before spine surgery. (Ibuprofen, Naproxen, Celebrex, Indocin, Diclofenac.)    -  DO NOT take these medications the day of surgery:  Amiloride (Midamor)  Vitamin C chew  Lactulose (Chronulac)  Metformin  Zinc    -  PLEASE TAKE these medications the day of surgery:  Cetirizine (Zyrtec)  Fluticasone (Flonase)  Methotrexate may be continued at the usual schedule  Methylprednisolone may be continued  Ondansetron (Zofran) if needed for nausea  Pantoprazole (Protonix)  Rifaximin  (Xifaxan)    How do I prepare myself?  - Please take 2 showers before surgery using Scrubcare or Hibiclens soap.    Use this soap only from the neck to your toes.     Leave the soap on your skin for one minute--then rinse thoroughly.      You may use your own shampoo and conditioner. No other hair products.   - Please remove all jewelry and body piercings.  - No lotions, deodorants or fragrance.  - No makeup or fingernail polish.   - Bring your ID and insurance card.      ALL PATIENTS GOING HOME THE SAME DAY OF SURGERY ARE REQUIRED TO HAVE A RESPONSIBLE ADULT TO DRIVE AND BE IN ATTENDANCE WITH THEM FOR 24 HOURS FOLLOWING SURGERY.      Questions or Concerns:    - For any questions regarding the day of surgery or your hospital stay, please contact the Pre Admission Nursing Office at [phone].       - If you have health changes between today and your surgery, please call your surgeon.       - For questions after surgery, please call your surgeons office.        -   Parking is available in the Patient Visitor Ramp on [address] and [facility].     -   When entering the hospital you will be asked COVID screening questions, you will then be directed to Registration.  Registration will direct you to the 3rd floor Surgery waiting room.     -   Please ask if you need an escort or a wheelchair to the Surgery Waiting Room.  Preop number- [phone]

## 2022-09-11 ENCOUNTER — HEALTH MAINTENANCE LETTER (OUTPATIENT)
Age: 67
End: 2022-09-11

## 2022-09-15 DIAGNOSIS — K76.82 HEPATIC ENCEPHALOPATHY (H): ICD-10-CM

## 2022-09-15 RX ORDER — LACTULOSE 10 G/15ML
20 SOLUTION ORAL; RECTAL 2 TIMES DAILY
Qty: 1800 ML | Refills: 11 | Status: SHIPPED | OUTPATIENT
Start: 2022-09-15 | End: 2022-09-16

## 2022-09-16 ENCOUNTER — TELEPHONE (OUTPATIENT)
Dept: TRANSPLANT | Facility: CLINIC | Age: 67
End: 2022-09-16

## 2022-09-16 DIAGNOSIS — K76.82 HEPATIC ENCEPHALOPATHY (H): ICD-10-CM

## 2022-09-16 RX ORDER — LACTULOSE 10 G/15ML
20 SOLUTION ORAL; RECTAL 4 TIMES DAILY
Qty: 3600 ML | Refills: 11 | Status: SHIPPED | OUTPATIENT
Start: 2022-09-16

## 2022-09-16 NOTE — TELEPHONE ENCOUNTER
UPDATE:    Spoke with patients wife.  He is improving today.  They had been running low on lactulose, as when he was hospitalized with covid (including complications with delirium vs HE during hospitalization), they increased his lactulose to BID but rx was still for once a day.    Currently having typically 2 BMS a day, improved after lactulose dose today.  Patient working with home PT and home care RN on overall strength.  Discussed titration of lactulose to 3-4 BMs a day, rx sent to reflect increased dosages that may be necessary.  Discussed tracking number of doses given and number of BMs per day.      Has ablation on 9/27, await follow up imaging after    Follow up with [place] 10-4-22.

## 2022-09-16 NOTE — TELEPHONE ENCOUNTER
radha from [place], home care RN.  Patient more confused, more off balance.  See from another encounter recent covid infection.      lvm in return, want to assess for s/s of HE, if he ha been compliant with lactulose/stool output etc.  Will also follow up with patient/wife.

## 2022-09-27 ENCOUNTER — APPOINTMENT (OUTPATIENT)
Dept: INTERVENTIONAL RADIOLOGY/VASCULAR | Facility: CLINIC | Age: 67
End: 2022-09-27
Attending: RADIOLOGY
Payer: COMMERCIAL

## 2022-09-27 ENCOUNTER — ANESTHESIA (OUTPATIENT)
Dept: SURGERY | Facility: CLINIC | Age: 67
End: 2022-09-27
Payer: COMMERCIAL

## 2022-09-27 ENCOUNTER — HOSPITAL ENCOUNTER (OUTPATIENT)
Facility: CLINIC | Age: 67
Discharge: HOME | End: 2022-09-27
Attending: RADIOLOGY | Admitting: RADIOLOGY
Payer: COMMERCIAL

## 2022-09-27 ENCOUNTER — ANESTHESIA EVENT (OUTPATIENT)
Dept: SURGERY | Facility: CLINIC | Age: 67
End: 2022-09-27
Payer: COMMERCIAL

## 2022-09-27 VITALS
TEMPERATURE: 98.3 F | SYSTOLIC BLOOD PRESSURE: 151 MMHG | WEIGHT: 176.37 LBS | OXYGEN SATURATION: 94 % | RESPIRATION RATE: 16 BRPM | HEIGHT: 71 IN | HEART RATE: 98 BPM | BODY MASS INDEX: 24.69 KG/M2 | DIASTOLIC BLOOD PRESSURE: 69 MMHG

## 2022-09-27 DIAGNOSIS — C22.0 HCC (HEPATOCELLULAR CARCINOMA) (H): Primary | ICD-10-CM

## 2022-09-27 LAB
ALBUMIN SERPL BCG-MCNC: 3.5 G/DL (ref 3.5–5.2)
ALP SERPL-CCNC: 292 U/L (ref 40–129)
ALT SERPL W P-5'-P-CCNC: 31 U/L (ref 10–50)
ANION GAP SERPL CALCULATED.3IONS-SCNC: 9 MMOL/L (ref 7–15)
AST SERPL W P-5'-P-CCNC: 71 U/L (ref 10–50)
BILIRUB DIRECT SERPL-MCNC: 0.63 MG/DL (ref 0–0.3)
BILIRUB SERPL-MCNC: 1.9 MG/DL
BUN SERPL-MCNC: 4.5 MG/DL (ref 8–23)
CALCIUM SERPL-MCNC: 9.1 MG/DL (ref 8.8–10.2)
CHLORIDE SERPL-SCNC: 105 MMOL/L (ref 98–107)
CREAT SERPL-MCNC: 0.51 MG/DL (ref 0.67–1.17)
DEPRECATED HCO3 PLAS-SCNC: 23 MMOL/L (ref 22–29)
ERYTHROCYTE [DISTWIDTH] IN BLOOD BY AUTOMATED COUNT: 17.5 % (ref 10–15)
GFR SERPL CREATININE-BSD FRML MDRD: >90 ML/MIN/1.73M2
GLUCOSE BLDC GLUCOMTR-MCNC: 126 MG/DL (ref 70–99)
GLUCOSE BLDC GLUCOMTR-MCNC: 142 MG/DL (ref 70–99)
GLUCOSE SERPL-MCNC: 140 MG/DL (ref 70–99)
HCT VFR BLD AUTO: 40.1 % (ref 40–53)
HGB BLD-MCNC: 13.5 G/DL (ref 13.3–17.7)
INR PPP: 1.25 (ref 0.85–1.15)
MCH RBC QN AUTO: 31.5 PG (ref 26.5–33)
MCHC RBC AUTO-ENTMCNC: 33.7 G/DL (ref 31.5–36.5)
MCV RBC AUTO: 94 FL (ref 78–100)
PLATELET # BLD AUTO: 82 10E3/UL (ref 150–450)
POTASSIUM SERPL-SCNC: 4 MMOL/L (ref 3.4–5.3)
PROT SERPL-MCNC: 5.6 G/DL (ref 6.4–8.3)
RBC # BLD AUTO: 4.29 10E6/UL (ref 4.4–5.9)
SODIUM SERPL-SCNC: 137 MMOL/L (ref 136–145)
WBC # BLD AUTO: 13.3 10E3/UL (ref 4–11)

## 2022-09-27 PROCEDURE — 85027 COMPLETE CBC AUTOMATED: CPT | Performed by: NURSE PRACTITIONER

## 2022-09-27 PROCEDURE — 250N000011 HC RX IP 250 OP 636: Performed by: STUDENT IN AN ORGANIZED HEALTH CARE EDUCATION/TRAINING PROGRAM

## 2022-09-27 PROCEDURE — 77013 CT GUIDE FOR TISSUE ABLATION: CPT | Mod: 26 | Performed by: RADIOLOGY

## 2022-09-27 PROCEDURE — 710N000012 HC RECOVERY PHASE 2, PER MINUTE

## 2022-09-27 PROCEDURE — 82248 BILIRUBIN DIRECT: CPT | Performed by: NURSE PRACTITIONER

## 2022-09-27 PROCEDURE — 47382 PERCUT ABLATE LIVER RF: CPT | Mod: GC | Performed by: RADIOLOGY

## 2022-09-27 PROCEDURE — 80053 COMPREHEN METABOLIC PANEL: CPT | Performed by: NURSE PRACTITIONER

## 2022-09-27 PROCEDURE — 999N000141 HC STATISTIC PRE-PROCEDURE NURSING ASSESSMENT

## 2022-09-27 PROCEDURE — 250N000013 HC RX MED GY IP 250 OP 250 PS 637: Performed by: STUDENT IN AN ORGANIZED HEALTH CARE EDUCATION/TRAINING PROGRAM

## 2022-09-27 PROCEDURE — 85610 PROTHROMBIN TIME: CPT | Performed by: NURSE PRACTITIONER

## 2022-09-27 PROCEDURE — 710N000010 HC RECOVERY PHASE 1, LEVEL 2, PER MIN

## 2022-09-27 PROCEDURE — 36415 COLL VENOUS BLD VENIPUNCTURE: CPT | Performed by: NURSE PRACTITIONER

## 2022-09-27 PROCEDURE — 82962 GLUCOSE BLOOD TEST: CPT

## 2022-09-27 PROCEDURE — 250N000011 HC RX IP 250 OP 636: Performed by: NURSE PRACTITIONER

## 2022-09-27 PROCEDURE — 272N000516 HC NEEDLE, ATENNA MICROWAVE ABLATION

## 2022-09-27 PROCEDURE — 250N000025 HC SEVOFLURANE, PER MIN

## 2022-09-27 PROCEDURE — 250N000011 HC RX IP 250 OP 636: Performed by: NURSE ANESTHETIST, CERTIFIED REGISTERED

## 2022-09-27 PROCEDURE — 250N000009 HC RX 250: Performed by: STUDENT IN AN ORGANIZED HEALTH CARE EDUCATION/TRAINING PROGRAM

## 2022-09-27 PROCEDURE — 75726 ARTERY X-RAYS ABDOMEN: CPT

## 2022-09-27 PROCEDURE — 250N000009 HC RX 250: Performed by: NURSE ANESTHETIST, CERTIFIED REGISTERED

## 2022-09-27 PROCEDURE — 370N000017 HC ANESTHESIA TECHNICAL FEE, PER MIN

## 2022-09-27 PROCEDURE — 258N000003 HC RX IP 258 OP 636: Performed by: STUDENT IN AN ORGANIZED HEALTH CARE EDUCATION/TRAINING PROGRAM

## 2022-09-27 PROCEDURE — 47382 PERCUT ABLATE LIVER RF: CPT

## 2022-09-27 RX ORDER — SODIUM CHLORIDE 9 MG/ML
INJECTION, SOLUTION INTRAVENOUS CONTINUOUS PRN
Status: DISCONTINUED | OUTPATIENT
Start: 2022-09-27 | End: 2022-09-27

## 2022-09-27 RX ORDER — LIDOCAINE HYDROCHLORIDE 10 MG/ML
1-30 INJECTION, SOLUTION EPIDURAL; INFILTRATION; INTRACAUDAL; PERINEURAL
Status: DISCONTINUED | OUTPATIENT
Start: 2022-09-27 | End: 2022-09-27 | Stop reason: HOSPADM

## 2022-09-27 RX ORDER — CEFAZOLIN SODIUM/WATER 2 G/20 ML
2 SYRINGE (ML) INTRAVENOUS
Status: COMPLETED | OUTPATIENT
Start: 2022-09-27 | End: 2022-09-27

## 2022-09-27 RX ORDER — KETOROLAC TROMETHAMINE 30 MG/ML
15 INJECTION, SOLUTION INTRAMUSCULAR; INTRAVENOUS ONCE
Status: COMPLETED | OUTPATIENT
Start: 2022-09-27 | End: 2022-09-27

## 2022-09-27 RX ORDER — HALOPERIDOL 5 MG/ML
1 INJECTION INTRAMUSCULAR
Status: DISCONTINUED | OUTPATIENT
Start: 2022-09-27 | End: 2022-09-27 | Stop reason: HOSPADM

## 2022-09-27 RX ORDER — DEXAMETHASONE SODIUM PHOSPHATE 4 MG/ML
INJECTION, SOLUTION INTRA-ARTICULAR; INTRALESIONAL; INTRAMUSCULAR; INTRAVENOUS; SOFT TISSUE PRN
Status: DISCONTINUED | OUTPATIENT
Start: 2022-09-27 | End: 2022-09-27

## 2022-09-27 RX ORDER — FENTANYL CITRATE 50 UG/ML
50 INJECTION, SOLUTION INTRAMUSCULAR; INTRAVENOUS EVERY 5 MIN PRN
Status: DISCONTINUED | OUTPATIENT
Start: 2022-09-27 | End: 2022-09-27 | Stop reason: HOSPADM

## 2022-09-27 RX ORDER — ONDANSETRON 2 MG/ML
4 INJECTION INTRAMUSCULAR; INTRAVENOUS ONCE
Status: COMPLETED | OUTPATIENT
Start: 2022-09-27 | End: 2022-09-27

## 2022-09-27 RX ORDER — PROPOFOL 10 MG/ML
INJECTION, EMULSION INTRAVENOUS PRN
Status: DISCONTINUED | OUTPATIENT
Start: 2022-09-27 | End: 2022-09-27

## 2022-09-27 RX ORDER — METOPROLOL TARTRATE 1 MG/ML
1-2 INJECTION, SOLUTION INTRAVENOUS EVERY 5 MIN PRN
Status: DISCONTINUED | OUTPATIENT
Start: 2022-09-27 | End: 2022-09-27 | Stop reason: HOSPADM

## 2022-09-27 RX ORDER — ONDANSETRON 4 MG/1
4 TABLET, ORALLY DISINTEGRATING ORAL EVERY 30 MIN PRN
Status: DISCONTINUED | OUTPATIENT
Start: 2022-09-27 | End: 2022-09-27 | Stop reason: HOSPADM

## 2022-09-27 RX ORDER — OXYCODONE HYDROCHLORIDE 5 MG/1
5 TABLET ORAL EVERY 6 HOURS PRN
Qty: 8 TABLET | Refills: 0 | Status: SHIPPED | OUTPATIENT
Start: 2022-09-27 | End: 2022-09-30

## 2022-09-27 RX ORDER — LIDOCAINE HYDROCHLORIDE 20 MG/ML
INJECTION, SOLUTION INFILTRATION; PERINEURAL PRN
Status: DISCONTINUED | OUTPATIENT
Start: 2022-09-27 | End: 2022-09-27

## 2022-09-27 RX ORDER — PHENYLEPHRINE HCL IN 0.9% NACL 50MG/250ML
.5-1.25 PLASTIC BAG, INJECTION (ML) INTRAVENOUS CONTINUOUS
Status: DISCONTINUED | OUTPATIENT
Start: 2022-09-27 | End: 2022-09-27 | Stop reason: HOSPADM

## 2022-09-27 RX ORDER — FENTANYL CITRATE 50 UG/ML
INJECTION, SOLUTION INTRAMUSCULAR; INTRAVENOUS PRN
Status: DISCONTINUED | OUTPATIENT
Start: 2022-09-27 | End: 2022-09-27

## 2022-09-27 RX ORDER — ONDANSETRON 2 MG/ML
4 INJECTION INTRAMUSCULAR; INTRAVENOUS EVERY 30 MIN PRN
Status: DISCONTINUED | OUTPATIENT
Start: 2022-09-27 | End: 2022-09-27 | Stop reason: HOSPADM

## 2022-09-27 RX ORDER — SODIUM CHLORIDE, SODIUM GLUCONATE, SODIUM ACETATE, POTASSIUM CHLORIDE AND MAGNESIUM CHLORIDE 526; 502; 368; 37; 30 MG/100ML; MG/100ML; MG/100ML; MG/100ML; MG/100ML
INJECTION, SOLUTION INTRAVENOUS CONTINUOUS PRN
Status: DISCONTINUED | OUTPATIENT
Start: 2022-09-27 | End: 2022-09-27

## 2022-09-27 RX ORDER — SODIUM CHLORIDE, SODIUM LACTATE, POTASSIUM CHLORIDE, CALCIUM CHLORIDE 600; 310; 30; 20 MG/100ML; MG/100ML; MG/100ML; MG/100ML
INJECTION, SOLUTION INTRAVENOUS CONTINUOUS
Status: DISCONTINUED | OUTPATIENT
Start: 2022-09-27 | End: 2022-09-27 | Stop reason: HOSPADM

## 2022-09-27 RX ORDER — OXYCODONE HYDROCHLORIDE 5 MG/1
5 TABLET ORAL EVERY 4 HOURS PRN
Status: DISCONTINUED | OUTPATIENT
Start: 2022-09-27 | End: 2022-09-27 | Stop reason: HOSPADM

## 2022-09-27 RX ORDER — CEFAZOLIN SODIUM/WATER 2 G/20 ML
2 SYRINGE (ML) INTRAVENOUS SEE ADMIN INSTRUCTIONS
Status: DISCONTINUED | OUTPATIENT
Start: 2022-09-27 | End: 2022-09-27 | Stop reason: HOSPADM

## 2022-09-27 RX ORDER — IBUPROFEN 200 MG
200 TABLET ORAL EVERY 6 HOURS PRN
Status: DISCONTINUED | OUTPATIENT
Start: 2022-09-27 | End: 2022-09-27 | Stop reason: HOSPADM

## 2022-09-27 RX ORDER — SODIUM CHLORIDE 9 MG/ML
INJECTION, SOLUTION INTRAVENOUS CONTINUOUS
Status: DISCONTINUED | OUTPATIENT
Start: 2022-09-27 | End: 2022-09-27 | Stop reason: HOSPADM

## 2022-09-27 RX ORDER — HYDROMORPHONE HYDROCHLORIDE 1 MG/ML
0.4 INJECTION, SOLUTION INTRAMUSCULAR; INTRAVENOUS; SUBCUTANEOUS EVERY 5 MIN PRN
Status: DISCONTINUED | OUTPATIENT
Start: 2022-09-27 | End: 2022-09-27 | Stop reason: HOSPADM

## 2022-09-27 RX ORDER — FENTANYL CITRATE 50 UG/ML
25 INJECTION, SOLUTION INTRAMUSCULAR; INTRAVENOUS
Status: DISCONTINUED | OUTPATIENT
Start: 2022-09-27 | End: 2022-09-27 | Stop reason: HOSPADM

## 2022-09-27 RX ADMIN — SODIUM CHLORIDE, SODIUM GLUCONATE, SODIUM ACETATE, POTASSIUM CHLORIDE AND MAGNESIUM CHLORIDE: 526; 502; 368; 37; 30 INJECTION, SOLUTION INTRAVENOUS at 08:16

## 2022-09-27 RX ADMIN — SODIUM CHLORIDE: 9 INJECTION, SOLUTION INTRAVENOUS at 08:35

## 2022-09-27 RX ADMIN — SUGAMMADEX 350 MG: 100 INJECTION, SOLUTION INTRAVENOUS at 10:00

## 2022-09-27 RX ADMIN — Medication 2 G: at 08:43

## 2022-09-27 RX ADMIN — OXYCODONE HYDROCHLORIDE 5 MG: 5 TABLET ORAL at 11:21

## 2022-09-27 RX ADMIN — Medication 20 MG: at 09:30

## 2022-09-27 RX ADMIN — PHENYLEPHRINE HYDROCHLORIDE 100 MCG: 10 INJECTION INTRAVENOUS at 09:27

## 2022-09-27 RX ADMIN — FENTANYL CITRATE 50 MCG: 50 INJECTION, SOLUTION INTRAMUSCULAR; INTRAVENOUS at 10:35

## 2022-09-27 RX ADMIN — FENTANYL CITRATE 25 MCG: 50 INJECTION, SOLUTION INTRAMUSCULAR; INTRAVENOUS at 09:57

## 2022-09-27 RX ADMIN — PHENYLEPHRINE HYDROCHLORIDE 100 MCG: 10 INJECTION INTRAVENOUS at 08:51

## 2022-09-27 RX ADMIN — DEXAMETHASONE SODIUM PHOSPHATE 4 MG: 4 INJECTION, SOLUTION INTRA-ARTICULAR; INTRALESIONAL; INTRAMUSCULAR; INTRAVENOUS; SOFT TISSUE at 09:30

## 2022-09-27 RX ADMIN — PHENYLEPHRINE HYDROCHLORIDE 200 MCG: 10 INJECTION INTRAVENOUS at 09:20

## 2022-09-27 RX ADMIN — PHENYLEPHRINE HYDROCHLORIDE 100 MCG: 10 INJECTION INTRAVENOUS at 09:36

## 2022-09-27 RX ADMIN — PROPOFOL 20 MG: 10 INJECTION, EMULSION INTRAVENOUS at 08:23

## 2022-09-27 RX ADMIN — PHENYLEPHRINE HYDROCHLORIDE 100 MCG: 10 INJECTION INTRAVENOUS at 09:34

## 2022-09-27 RX ADMIN — ONDANSETRON 4 MG: 2 INJECTION INTRAMUSCULAR; INTRAVENOUS at 09:55

## 2022-09-27 RX ADMIN — PROPOFOL 70 MG: 10 INJECTION, EMULSION INTRAVENOUS at 08:21

## 2022-09-27 RX ADMIN — PHENYLEPHRINE HYDROCHLORIDE 100 MCG: 10 INJECTION INTRAVENOUS at 09:10

## 2022-09-27 RX ADMIN — Medication 1 UNITS: at 09:41

## 2022-09-27 RX ADMIN — Medication 50 MG: at 08:22

## 2022-09-27 RX ADMIN — LIDOCAINE HYDROCHLORIDE 60 MG: 20 INJECTION, SOLUTION INFILTRATION; PERINEURAL at 08:21

## 2022-09-27 RX ADMIN — FENTANYL CITRATE 50 MCG: 50 INJECTION, SOLUTION INTRAMUSCULAR; INTRAVENOUS at 10:59

## 2022-09-27 RX ADMIN — KETOROLAC TROMETHAMINE 15 MG: 30 INJECTION, SOLUTION INTRAMUSCULAR at 10:13

## 2022-09-27 RX ADMIN — PHENYLEPHRINE HYDROCHLORIDE 100 MCG: 10 INJECTION INTRAVENOUS at 09:13

## 2022-09-27 RX ADMIN — FENTANYL CITRATE 100 MCG: 50 INJECTION, SOLUTION INTRAMUSCULAR; INTRAVENOUS at 08:21

## 2022-09-27 ASSESSMENT — LIFESTYLE VARIABLES: TOBACCO_USE: 1

## 2022-09-27 ASSESSMENT — ACTIVITIES OF DAILY LIVING (ADL)
ADLS_ACUITY_SCORE: 41
ADLS_ACUITY_SCORE: 35
ADLS_ACUITY_SCORE: 41
ADLS_ACUITY_SCORE: 41

## 2022-09-27 NOTE — PROGRESS NOTES
Patient had COVID positive 8/24/22, patient is considered COVID recovered, travel screen is negative. 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 Patient is oriented to self, situation otherwise confused and forgetful.

## 2022-09-27 NOTE — IR NOTE
Patient Name: Mehdi Martinez  Medical Record Number: [id number]  Today's Date: 9/27/2022    Procedure: CT guided liver ablation  Proceduralist: Dr. Jose Valerio    Procedure Start: 0911  Procedure end: 1000  Sedation medications administered: general anesthesia     Report given to: RN PACU  : AMELIA    Other Notes: Pt arrived to IR room 1 from . Consent reviewed. Pt denies any questions or concerns regarding procedure. Pt positioned supine and monitored per protocol. Pt tolerated procedure without any noted complications. Pt transferred to PACU.

## 2022-09-27 NOTE — ANESTHESIA CARE TRANSFER NOTE
Patient: Mehdi Martinez    Procedure: Procedure(s):  ANESTHESIA OUT OF OR COMPUTED TOMOGRAPHY LIVER ABLATION WITH POSSIBLE ANGIOGRAM@0800       Diagnosis: Hepatocellular carcinoma (H) [C22.0]  Diagnosis Additional Information: No value filed.    Anesthesia Type:   General     Note:    Oropharynx: spontaneously breathing  Level of Consciousness: drowsy  Oxygen Supplementation: face mask  Level of Supplemental Oxygen (L/min / FiO2): 8  Independent Airway: airway patency satisfactory and stable  Dentition: dentition unchanged  Vital Signs Stable: post-procedure vital signs reviewed and stable  Report to RN Given: handoff report given  Patient transferred to: PACU  Comments: VSS. Report to RN. Patient arousable, airway patent.  Handoff Report: Identifed the Patient, Identified the Reponsible Provider, Reviewed the pertinent medical history, Discussed the surgical course, Reviewed Intra-OP anesthesia mangement and issues during anesthesia, Set expectations for post-procedure period and Allowed opportunity for questions and acknowledgement of understanding      Vitals:  Vitals Value Taken Time   /78 09/27/22 1019   Temp     Pulse 86 09/27/22 1021   Resp 17 09/27/22 1021   SpO2 99 % 09/27/22 1021   Vitals shown include unvalidated device data.    Electronically Signed By: TAMEKA Johnston CRNA  September 27, 2022  10:23 AM

## 2022-09-27 NOTE — ANESTHESIA PREPROCEDURE EVALUATION
Pre-Operative H & P     CC:  Preoperative exam to assess for increased cardiopulmonary risk while undergoing surgery and anesthesia.    Date of Encounter: 9/7/2022  Primary Care Physician:  David Heller     Reason for visit:   No diagnosis found.    IMAN Martinez is a 66 year old male who presents for pre-operative H & P in preparation for  Procedure Information     Date/Time: 9/27/22     Procedure: ANESTHESIA OUT OF OR COMPUTED TOMOGRAPHY LIVER ABLATION WITH POSSIBLE ANGIOGRAM@0800    Anesthesia type: general    Pre-op diagnosis: HCC    Location: [facility]    Providers: Ming Martinez has a past medical history for minimal non-obstructive CAD, asthma, former smoker, DMII, GERD, previous alcohol dependence sober since 2015 and liver cirrhosis complicated by hepatocellular carcinoma.     Mr. 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  On the patient s most recent MRI, there appears to be a second LIRADS 4 segment 6 lesion.  His case was discussed at tumor board with the consensus recommendation to proceed with microwave ablation treatment of this lesion.  Mr. Martinez presents to the PAC in-person today with his wife, Felicia, in preparation for the above procedure.      Of significance, he was hospitalized at the [facility] from 8/24-8/29/22 for generalized weakness 2/2 to COVID 19 pneumonia.  Based on his immunocompromised state with rheumatoid arthritis and diabetes, he was started on remdesivir 3 day protocol.  His stay was complicated with hospital delirium but resolved and was discharged on 8/29/2022 in stable condition. Today he denies any residual symptoms.      History is obtained from the patient, electronic health record and patient's spouse and chart review    Hx of abnormal bleeding or  anti-platelet use: denies      Past Medical History  Past Medical History:   Diagnosis Date     Alcohol abuse     quit September 2015     Allergic rhinitis      Aortic calcification (H)      Asthma      Cancer (H)      Chronic hepatitis C with cirrhosis (H)      Diabetes mellitus, type II (H)      Diverticulosis      History of right shoulder fracture 03/2022    no surgery healed on its own     HTN (hypertension)      Pneumonia due to 2019 novel coronavirus 08/24/2022     Portal hypertension (H)      SBP (spontaneous bacterial peritonitis) (H) 01/2016     Splenomegaly        Past Surgical History  Past Surgical History:   Procedure Laterality Date     ARTHROSCOPY SHOULDER  90s     CV CORONARY ANGIOGRAM N/A 1/21/2022    Procedure: CV CORONARY ANGIOGRAM;  Surgeon: Clint Cerda MD;  Location: [facility]     HC ESOPHAGOSCOPY, DIAGNOSTIC  Jan 2015    small varices, portal HTN gastropathy in the stomach, otherwise normal     HERNIA REPAIR      inguinal     HERNIORRHAPHY UMBILICAL N/A 8/18/2016    Procedure: HERNIORRHAPHY UMBILICAL;  Surgeon: Yadira Lutz MD;  Location: [facility] OR     IR SIRT (SELECTIVE INTERNAL RADIO THERAPY)  12/13/2021     IR VISCERAL ANGIOGRAM  12/13/2021     IR VISCERAL EMBOLIZATION  12/14/2021     KNEE SURGERY  80s     LAPAROSCOPY DIAGNOSTIC (GENERAL) N/A 1/28/2016    Procedure: LAPAROSCOPY DIAGNOSTIC (GENERAL);  Surgeon: Jeannine Schneider MD;  Location: [facility] OR     laproscopic cholecystectomy  Nov 2015    liver biopsy performed at the same time       Prior to Admission Medications  No current outpatient medications on file.       Allergies  Allergies   Allergen Reactions     Artemisia Absinthium Difficulty breathing     Acetaminophen Other (See Comments)     Other reaction(s): Avoids due to cirrhosis, hepatitis C  Avoids acetaminophen due to cirrhosis, hepatitis c       Mold      Molds & Smuts      Other reaction(s): Other, see comments  No reaction  documented     Other Environmental Allergy Other (See Comments)     Ragweed, no reactions were documented     Ragweeds      Sulfa Drugs Itching     Terfenadine Itching     Itchy rash       Social History  Social History     Socioeconomic History     Marital status:      Spouse name: Not on file     Number of children: Not on file     Years of education: Not on file     Highest education level: Not on file   Occupational History     Not on file   Tobacco Use     Smoking status: Former Smoker     Types: Cigars     Smokeless tobacco: Never Used   Vaping Use     Vaping Use: Never used   Substance and Sexual Activity     Alcohol use: No     Alcohol/week: 0.0 standard drinks     Comment: Hx of heavy beer drinking, None since Sept 2015     Drug use: No     Sexual activity: Not on file   Other Topics Concern     Parent/sibling w/ CABG, MI or angioplasty before 65F 55M? Not Asked   Social History Narrative     Not on file     Social Determinants of Health     Financial Resource Strain: Not on file   Food Insecurity: Not on file   Transportation Needs: Not on file   Physical Activity: Not on file   Stress: Not on file   Social Connections: Not on file   Intimate Partner Violence: Not on file   Housing Stability: Not on file       Family History  Family History   Problem Relation Age of Onset     Colon Cancer Mother      Chronic Obstructive Pulmonary Disease Father      Cancer Sister      Ovarian Cancer Sister        Review of Systems  The complete review of systems is negative other than noted in the HPI or here.   Anesthesia Evaluation   Pt has had prior anesthetic. Type: General and MAC.    No history of anesthetic complications       ROS/MED HX  ENT/Pulmonary:     (+) DAISY risk factors, hypertension, allergic rhinitis, tobacco use, Past use, Intermittent, asthma (allergy induced.  ) Treatment: Inhaler prn,      Neurologic:  - neg neurologic ROS     Cardiovascular: Comment: Denies cardiac symptoms including chest  "pain, SOB, palpitations, syncope, ALBERT, orthopnea, or PND.      (+) hypertension-----Previous cardiac testing   Echo: Date: Results:    Stress Test: Date: Results:    ECG Reviewed: Date: 2022 Results:    Cath: Date: 2022 Results:      METS/Exercise Tolerance:  Comment: METS<4.  Wife helps him get dressed.    Hematologic: Comments: Chronic lymphocytic leukemia follows with Dr. Hoang with last visit on 8/17/22. Thrombocytopenia with Plts ~87K.  Considered Stable and no treatment at this point. - neg hematologic  ROS     Musculoskeletal: Comment: [place] this past [place] 20[place] in [place], [place], sustained aC7 closed fracture and closed fractured right scapular 3/15/2022 2/2 fall. Now with limited neck extension.     S/P thoracic spinal fusion.    (+) arthritis (s/p b/l PETTY. OA in hands. ),     GI/Hepatic: Comment:  hepatic portal shunt place for portal HTN    (+) GERD, Asymptomatic on medication, hepatitis type C, liver disease,     Renal/Genitourinary:       Endo:     (+) type II DM, Not using insulin, - not using insulin pump. Normal glucose range: does not test. , not previously admitted for DM/DKA.     Psychiatric/Substance Use:     (+) alcohol abuse  (-) chronic opioid use historyPsychiatric history: Sober since 2015.   Infectious Disease: Comment: COVID 19 (+) about three weeks ago reporting symptoms of fever, cough and tired. Hospitalized at [facility] (8/24/2022) with records indicating \"COVID Pneumonia\".  Treated with Remdesivir 3 day protocol.   Denies any residual symptoms.   Completed COVID vaccines.       Malignancy:   (+) Malignancy (\"Stable\" followed by Dr. Mcgill), History of Lymphoma/Leukemia and Other.Lymph CA Active status post.  Other CA HCC s/p ablation. Active status post.    Other:      (+) , other significant disability (Uses a cane or roller. ) Other (comment),          There were no vitals taken for this visit.    Physical Exam   Constitutional: Awake, alert, " cooperative, no apparent distress, and appears stated age.  Eyes: Pupils equal, round and reactive to light, extra ocular muscles intact, sclera clear, conjunctiva normal.  HENT: Normocephalic, oral pharynx with moist mucus membranes, dentition in fair repair. No goiter appreciated.   Respiratory: Clear to auscultation bilaterally, no crackles or wheezing.  Cardiovascular: Regular rate and rhythm, normal S1 and S2, and no murmur noted.  Carotids +2, no bruits. No edema. Palpable pulses to radial  DP and PT arteries.   GI: Normal bowel sounds, soft, non-distended, non-tender, no masses palpated, no hepatosplenomegaly.  Surgical scars: well healed  Lymph/Hematologic: No cervical lymphadenopathy and no supraclavicular lymphadenopathy.  Genitourinary:  deferred  Skin: Warm and dry.  No rashes at anticipated surgical site.   Musculoskeletal: Limited neck extension. There is no redness, warmth, or swelling of the joints. Gross motor strength is normal.    Neurologic: Awake, alert, oriented to name, place and time. Cranial nerves II-XII are grossly intact. Altered gait.  Neuropsychiatric: Calm, cooperative. Normal affect.     Prior Labs/Diagnostic Studies   All labs and imaging personally reviewed   Lab Results   Component Value Date    WBC 12.2 04/19/2022    WBC 22.1 03/18/2021     Lab Results   Component Value Date    RBC 4.95 04/19/2022    RBC 5.03 03/18/2021     Lab Results   Component Value Date    HGB 15.0 04/19/2022    HGB 15.3 03/18/2021     Lab Results   Component Value Date    HCT 45.9 04/19/2022    HCT 46.4 03/18/2021     Lab Results   Component Value Date    MCV 93 04/19/2022    MCV 92 03/18/2021     Lab Results   Component Value Date    MCH 30.3 04/19/2022    MCH 30.4 03/18/2021     Lab Results   Component Value Date    MCHC 32.7 04/19/2022    MCHC 33.0 03/18/2021     Lab Results   Component Value Date    RDW 16.2 04/19/2022    RDW 14.6 03/18/2021     Lab Results   Component Value Date     04/19/2022      03/18/2021       Last Comprehensive Metabolic Panel:  Sodium   Date Value Ref Range Status   09/07/2022 139 136 - 145 mmol/L Final   03/18/2021 143 133 - 144 mmol/L Final     Potassium   Date Value Ref Range Status   09/07/2022 4.4 3.4 - 5.3 mmol/L Final   07/05/2022 3.5 3.4 - 5.3 mmol/L Final   03/18/2021 4.6 3.4 - 5.3 mmol/L Final     Chloride   Date Value Ref Range Status   09/07/2022 105 98 - 107 mmol/L Final   07/05/2022 110 (H) 94 - 109 mmol/L Final   03/18/2021 114 (H) 94 - 109 mmol/L Final     Carbon Dioxide   Date Value Ref Range Status   03/18/2021 25 20 - 32 mmol/L Final     Carbon Dioxide (CO2)   Date Value Ref Range Status   09/07/2022 21 (L) 22 - 29 mmol/L Final   07/05/2022 27 20 - 32 mmol/L Final     Anion Gap   Date Value Ref Range Status   09/07/2022 13 7 - 15 mmol/L Final   07/05/2022 3 3 - 14 mmol/L Final   03/18/2021 4 3 - 14 mmol/L Final     Glucose   Date Value Ref Range Status   07/05/2022 128 (H) 70 - 99 mg/dL Final   03/18/2021 117 (H) 70 - 99 mg/dL Final     GLUCOSE BY METER POCT   Date Value Ref Range Status   09/27/2022 142 (H) 70 - 99 mg/dL Final     Urea Nitrogen   Date Value Ref Range Status   09/07/2022 10.7 8.0 - 23.0 mg/dL Final   07/05/2022 8 7 - 30 mg/dL Final   03/18/2021 10 7 - 30 mg/dL Final     Creatinine   Date Value Ref Range Status   09/07/2022 0.60 (L) 0.67 - 1.17 mg/dL Final   03/18/2021 0.70 0.66 - 1.25 mg/dL Final     GFR Estimate   Date Value Ref Range Status   09/07/2022 >90 >60 mL/min/1.73m2 Final     Comment:     Effective December 21, 2021 eGFRcr in adults is calculated using the 2021 CKD-EPI creatinine equation which includes age and gender ( et al., NEJM, DOI: 10.1056/YWGIwv9549280)   03/18/2021 >90 >60 mL/min/[1.73_m2] Final     Comment:     Non  GFR Calc  Starting 12/18/2018, serum creatinine based estimated GFR (eGFR) will be   calculated using the Chronic Kidney Disease Epidemiology Collaboration   (CKD-EPI) equation.        Calcium   Date Value Ref Range Status   09/07/2022 9.5 8.8 - 10.2 mg/dL Final   03/18/2021 9.4 8.5 - 10.1 mg/dL Final       Hemoglobin A1C   Date Value Ref Range Status   11/30/2021 6.4 (H) 0.0 - 5.6 % Final     Comment:     Normal <5.7%   Prediabetes 5.7-6.4%    Diabetes 6.5% or higher     Note: Adopted from ADA consensus guidelines.   01/21/2016 5.6 4.3 - 6.0 % Final     PROCEDURES   EXAM: XR PORTABLE CHEST 1 VIEW   LOCATION: [facility]   DATE/TIME: 8/24/2022 5:10 PM     INDICATION: Fever, weakness   COMPARISON: Chest radiograph 08/02/2022     IMPRESSION: Stable cardiomediastinal silhouette. No focal airspace disease, pleural effusion or pneumothorax. Skinfold overlying the lateral left lung. No acute bony abnormality.      ECG 8/2022     Sinus tachycardia      Otherwise normal ECG      When compared with ECG of 18-AUG-2016 14:28,      Minimal criteria for Anteroseptal infarct are no longer Present      Nonspecific T wave abnormality no longer evident in Anterior leads      Confirmed by Augustus Mckinney ([id number]) on 8/8/2022          CT Cervical spine 6/2022 IMPRESSION:      1.  Further interval healing of the fractures involving the C7-T1 vertebral bodies.      2. Possible start of partial ankylosis across the C7-T1 disc space level.      3.  Stable diffuse degenerative changes of the cervical spine with canal compromise and neural foraminal narrowing visualized.     Coronary angiogram 2022      Indication: Part of liver transplant work-up     Conclusion      Minimal non obstructive CAD.     DSE 2021     Interpretation Summary     Normal, low-risk dobutamine echocardiogram without evidence of ischemia. The     estimated PA systolic pressure is 12 mmHg.     87% of the maximum predicted heart rate was achieved (target is 85%).     Normal biventricular size, thickness, and global systolic function at     baseline.     With low-dose dobutamine, LVEF augmented and LV cavity size decreased      appropriately.     With peak dobutamine, LVEF increased further to >70% and LV cavity size     decreased appropriately.     No regional wall motion abnormalities at rest or with dobutamine.     The test was terminated due to the achievement of target heart rate.     No angina was elicited.     No ECG evidence of ischemia. Normal heart rate and blood pressure response to     dobutamine. Occasional PVCs during stress.     No significant valvular abnormalities are noted on screening Doppler exam.     The aortic root and visualized ascending aorta are normal.     No prior stress test was available for comparison         The patient's records and results personally reviewed by this provider.     Outside records reviewed from: [facility] Everywhere    LAB/DIAGNOSTIC STUDIES TODAY:    Lab Results   Component Value Date    WBC 10.2 09/07/2022    WBC 22.1 03/18/2021     Lab Results   Component Value Date    RBC 4.33 09/07/2022    RBC 5.03 03/18/2021     Lab Results   Component Value Date    HGB 13.7 09/07/2022    HGB 15.3 03/18/2021     Lab Results   Component Value Date    HCT 41.0 09/07/2022    HCT 46.4 03/18/2021     Lab Results   Component Value Date    MCV 95 09/07/2022    MCV 92 03/18/2021     Lab Results   Component Value Date    MCH 31.6 09/07/2022    MCH 30.4 03/18/2021     Lab Results   Component Value Date    MCHC 33.4 09/07/2022    MCHC 33.0 03/18/2021     Lab Results   Component Value Date    RDW 16.6 09/07/2022    RDW 14.6 03/18/2021     Lab Results   Component Value Date    PLT 98 09/07/2022     03/18/2021       Last Comprehensive Metabolic Panel:  Sodium   Date Value Ref Range Status   09/07/2022 139 136 - 145 mmol/L Final   03/18/2021 143 133 - 144 mmol/L Final     Potassium   Date Value Ref Range Status   09/07/2022 4.4 3.4 - 5.3 mmol/L Final   07/05/2022 3.5 3.4 - 5.3 mmol/L Final   03/18/2021 4.6 3.4 - 5.3 mmol/L Final     Chloride   Date Value Ref Range Status   09/07/2022 105 98 - 107 mmol/L Final    07/05/2022 110 (H) 94 - 109 mmol/L Final   03/18/2021 114 (H) 94 - 109 mmol/L Final     Carbon Dioxide   Date Value Ref Range Status   03/18/2021 25 20 - 32 mmol/L Final     Carbon Dioxide (CO2)   Date Value Ref Range Status   09/07/2022 21 (L) 22 - 29 mmol/L Final   07/05/2022 27 20 - 32 mmol/L Final     Anion Gap   Date Value Ref Range Status   09/07/2022 13 7 - 15 mmol/L Final   07/05/2022 3 3 - 14 mmol/L Final   03/18/2021 4 3 - 14 mmol/L Final     Glucose   Date Value Ref Range Status   07/05/2022 128 (H) 70 - 99 mg/dL Final   03/18/2021 117 (H) 70 - 99 mg/dL Final     GLUCOSE BY METER POCT   Date Value Ref Range Status   09/27/2022 142 (H) 70 - 99 mg/dL Final     Urea Nitrogen   Date Value Ref Range Status   09/07/2022 10.7 8.0 - 23.0 mg/dL Final   07/05/2022 8 7 - 30 mg/dL Final   03/18/2021 10 7 - 30 mg/dL Final     Creatinine   Date Value Ref Range Status   09/07/2022 0.60 (L) 0.67 - 1.17 mg/dL Final   03/18/2021 0.70 0.66 - 1.25 mg/dL Final     GFR Estimate   Date Value Ref Range Status   09/07/2022 >90 >60 mL/min/1.73m2 Final     Comment:     Effective December 21, 2021 eGFRcr in adults is calculated using the 2021 CKD-EPI creatinine equation which includes age and gender (Silver et al., NEJM, DOI: 10.1056/CAVLbr4569857)   03/18/2021 >90 >60 mL/min/[1.73_m2] Final     Comment:     Non  GFR Calc  Starting 12/18/2018, serum creatinine based estimated GFR (eGFR) will be   calculated using the Chronic Kidney Disease Epidemiology Collaboration   (CKD-EPI) equation.       Calcium   Date Value Ref Range Status   09/07/2022 9.5 8.8 - 10.2 mg/dL Final   03/18/2021 9.4 8.5 - 10.1 mg/dL Final       Lab Results   Component Value Date    AST 73 09/07/2022    AST 84 03/18/2021     Lab Results   Component Value Date    ALT 26 09/07/2022    ALT 70 03/18/2021     No results found for: BILICONJ   Lab Results   Component Value Date    BILITOTAL 1.6 09/07/2022    BILITOTAL 1.4 03/18/2021     Lab Results    Component Value Date    ALBUMIN 3.5 09/07/2022    ALBUMIN 3.4 07/05/2022    ALBUMIN 3.4 03/18/2021     Lab Results   Component Value Date    PROTTOTAL 5.9 09/07/2022    PROTTOTAL 6.1 03/18/2021      Lab Results   Component Value Date    ALKPHOS 233 09/07/2022    ALKPHOS 189 03/18/2021         Assessment  Mehdi Martinez is a 66 year old male seen as a PAC referral for risk assessment and optimization for anesthesia.    Plan/Recommendations  Pt will be optimized for the proposed procedure.  See below for details on the assessment, risk, and preoperative recommendations    NEUROLOGY  - No history of TIA, CVA or seizure  -Post Op delirium risk factors:  History of prior delirium    ENT  - Very limited neck extension since sustaing C& closed fracture this spring. Recommend difficult airway tray.   Mallampati: II  TM: > 3       CARDIAC  - Minimal non-obstructive CAD on coronary angiogram (2022)..  Denies cardiac symptoms.  - HTN treated with amloride.  Hold DOS.   - METS (Metabolic Equivalents)<4  - RCRI-Very low risk: Class 1 0.4% complication rate            Total Score: 0        PULMONARY  - Hospitalized at [facility] from  8/24-8/29/22 for generalized weakness 2/2 to COVID 19 pneumonia.  Based on his immunocompromised state with rheumatoid arthritis and diabetes, he was started on remdesivir 3 day protocol.  His stay was complicated with hospital delirium but resolved and was discharged on 8/29/2022 in stable condition. Today he denies any residual symptoms.    ~ Per [facility] COVID (+) delay of surgery protocol:  **Time sensitive,  2-6 week delay per surgeon.    **Elective surgery , six week delay.     ~ Will notify surgeon.      DAISY Medium Risk            Total Score: 4    DAISY: Often tired    DAISY: Hypertension    DAISY: Over 50 ys old    DAISY: Male      - Asthma, allergy induced.  Well controlled on allergy medicine.  Rarely uses albuterol inhaler.   - Tobacco History  History   Smoking Status     Former  "Smoker     Types: Cigars   Smokeless Tobacco     Never Used       GI  -  Cirrhosis caused by hepatitis C complicated by HCC status post radioembolization 12/14/21 and TIPS 8/9/16. Most recent MRI, there appears to be a second LIRADS 4 segment 6 lesion.  His case was discussed at tumor board with the consensus recommendation to proceed with microwave ablation treatment of this lesion.    - GERD  Controlled on medications: Proton Pump Inhibitor  PONV Low Risk  Total Score: 1           1 AN PONV: Patient is not a current smoker        /RENAL  - Baseline Creatinine  See above     ENDOCRINE    - BMI: Estimated body mass index is 24.6 kg/m  as calculated from the following:    Height as of an earlier encounter on 9/27/22: 1.803 m (5' 11\").    Weight as of an earlier encounter on 9/27/22: 80 kg (176 lb 5.9 oz).  Overweight (BMI 25.0-29.9)  - Diabetes Mellitus, Type II, non-insulin dependent.  Hold morning oral hypoglycemic medications. Recommend close monitoring of the patient's blood glucose levels throughout the perioperative period.    HEME  - Chronic lymphocytic leukemia followed by Dr. Hoang with last visit on 8/17/2022.  Appears to be stable  - Thrombocytopenia with plts 98K today.   VTE High Risk 3%            Total Score: 8    VTE: Greater than 59 yrs old    VTE: Male    VTE: Current cancer      - No history of abnormal bleeding or antiplatelet use.    MSK  - C7 closed fracture and closed fractured right scapular 3/15/2022 2/2 traumatic fall with limited neck extension. Consideration for careful positioning.     IMMUNE  - Rheumatoid arthritis treated with weekly methotrexate also on folic acid    PSYCH  - h/o alcohol dependence, sober since 2015.    Patient was discussed with Dr David    The patient is optimized for their procedure. AVS with information on surgery time/arrival time, meds and NPO status given by nursing staff. No further diagnostic testing indicated.      On the day of service:     Prep time: 20 " minutes  Visit time: 20 minutes  Documentation time: 20 minutes  ------------------------------------------  Total time: 60 minutes      TAMEKA Araya CNP  Preoperative Assessment Center  [facility]  Phone: [phone]  Fax: [phone]    Physical Exam    Airway        Mallampati: II   TM distance: > 3 FB   Neck ROM: full   Mouth opening: > 3 cm    Respiratory Devices and Support         Dental           Cardiovascular          Rhythm and rate: regular and normal     Pulmonary                     Anesthesia Plan    ASA Status:  3      Anesthesia Type: General.     - Airway: ETT   Induction: Intravenous.      Techniques and Equipment:     - Airway: Video-Laryngoscope     - Lines/Monitors: 2nd IV     Consents    Anesthesia Plan(s) and associated risks, benefits, and realistic alternatives discussed. Questions answered and patient/representative(s) expressed understanding.     - Discussed: Risks, Benefits and Alternatives for BOTH SEDATION and the PROCEDURE were discussed     - Discussed with:  Patient      - Extended Intubation/Ventilatory Support Discussed: No.      - Patient is DNR/DNI Status: No    Use of blood products discussed: No .     Postoperative Care    Pain management: Multi-modal analgesia.   PONV prophylaxis: Ondansetron (or other 5HT-3), Dexamethasone or Solumedrol     Comments:

## 2022-09-27 NOTE — ANESTHESIA POSTPROCEDURE EVALUATION
Patient: Mehdi Martinez    Procedure: Procedure(s):  ANESTHESIA OUT OF OR COMPUTED TOMOGRAPHY LIVER ABLATION WITH POSSIBLE ANGIOGRAM@0800       Anesthesia Type:  General    Note:  Disposition: Outpatient   Postop Pain Control: Uneventful            Sign Out: Well controlled pain   PONV: No   Neuro/Psych: Uneventful            Sign Out: Acceptable/Baseline neuro status   Airway/Respiratory: Uneventful            Sign Out: Acceptable/Baseline resp. status   CV/Hemodynamics: Uneventful            Sign Out: Acceptable CV status; No obvious hypovolemia; No obvious fluid overload   Other NRE: NONE   DID A NON-ROUTINE EVENT OCCUR? No           Last vitals:  Vitals Value Taken Time   /71 09/27/22 1100   Temp 37.3  C (99.2  F) 09/27/22 1020   Pulse 88 09/27/22 1104   Resp 11 09/27/22 1104   SpO2 97 % 09/27/22 1104   Vitals shown include unvalidated device data.    Electronically Signed By: Javy Mckinney MD  September 27, 2022  11:06 AM

## 2022-09-27 NOTE — PROCEDURES
[facility]    Procedure: IR CT guided ablation    Date/Time: 9/27/2022 10:11 AM  Performed by: Mickey Garcia MD  Authorized by: Rohan Lai MD       UNIVERSAL PROTOCOL   Site Marked: NA  Prior Images Obtained and Reviewed:  Yes  Required items: Required blood products, implants, devices and special equipment available    Patient identity confirmed:  Verbally with patient, arm band, provided demographic data and hospital-assigned identification number  Patient was reevaluated immediately before administering moderate or deep sedation or anesthesia  Confirmation Checklist:  Patient's identity using two indicators, relevant allergies, procedure was appropriate and matched the consent or emergent situation and correct equipment/implants were available  Time out: Immediately prior to the procedure a time out was called    Universal Protocol: the Joint Commission Universal Protocol was followed    Preparation: Patient was prepped and draped in usual sterile fashion       ANESTHESIA    Anesthesia: Local infiltration  Local Anesthetic:  Lidocaine 1% without epinephrine  Anesthetic Total (mL):  5      SEDATION  Patient Sedated: Yes    Sedation Type:  Deep  Vital signs: Vital signs monitored during sedation    See dictated procedure note for full details.  Findings: Segment 6 liver HCC microwave ablation performed.    Specimens: none    Complications: None    Condition: Stable    Plan: Follow up in clinic after 1 month      PROCEDURE    Patient Tolerance:  Patient tolerated the procedure well with no immediate complications  Length of time physician/provider present for 1:1 monitoring during sedation: 0

## 2022-09-27 NOTE — DISCHARGE INSTRUCTIONS
[facility]  Same-Day Surgery   Adult Discharge Orders & Instructions     For 24 hours after surgery    Get plenty of rest.  A responsible adult must stay with you for at least 24 hours after you leave the hospital.   Do not drive or use heavy equipment.  If you have weakness or tingling, don't drive or use heavy equipment until this feeling goes away.  Do not drink alcohol.  Avoid strenuous or risky activities.  Ask for help when climbing stairs.   You may feel lightheaded.  IF so, sit for a few minutes before standing.  Have someone help you get up.   If you have nausea (feel sick to your stomach): Drink only clear liquids such as apple juice, ginger ale, broth or 7-Up.  Rest may also help.  Be sure to drink enough fluids.  Move to a regular diet as you feel able.  You may have a slight fever. Call the doctor if your fever is over 100 F (37.7 C) (taken under the tongue) or lasts longer than 24 hours.  You may have a dry mouth, a sore throat, muscle aches or trouble sleeping.  These should go away after 24 hours.  Do not make important or legal decisions.     Call your doctor for any of the followin.  Signs of infection (fever, growing tenderness at the surgery site, a large amount of drainage or bleeding, severe pain, foul-smelling drainage, redness, swelling).    2. It has been over 8 to 10 hours since surgery and you are still not able to urinate (pass water).    3.  Headache for over 24 hours.    To contact a doctor, call Dr. Lai at [phone] ' [phone] and ask for the resident on call for Interventional radiology (answered 24 hours a day)  '   Emergency Department:    [facility]: [phone]       (TTY for hearing impaired: [phone])    [facility]: [phone]       (TTY for hearing impaired: [phone])

## 2022-09-27 NOTE — ANESTHESIA PROCEDURE NOTES
Airway       Patient location during procedure: OR       Procedure Start/Stop Times: 9/27/2022 8:25 AM  Staff -        CRNA: Cara Alves APRN CRNA       Other Anesthesia Staff: June Grove       Performed By: AMELIA  Consent for Airway        Urgency: elective  Indications and Patient Condition       Indications for airway management: fernando-procedural       Induction type:intravenous       Mask difficulty assessment: 2 - vent by mask + OA or adjuvant +/- NMBA    Final Airway Details       Final airway type: endotracheal airway       Successful airway: ETT - single  Endotracheal Airway Details        ETT size (mm): 8.0       Cuffed: yes       Successful intubation technique: video laryngoscopy       VL Blade Size: MAC 4       Grade View of Cords: 1       Adjucts: stylet       Position: Right       Measured from: lips       Secured at (cm): 23       Bite block used: None    Post intubation assessment        Placement verified by: capnometry, equal breath sounds and chest rise        Number of attempts at approach: 1       Number of other approaches attempted: 0       Secured with: silk tape       Ease of procedure: easy       Dentition: Lips/oral mucosa injury, Unchanged and Intact    Medication(s) Administered   Medication Administration Time: 9/27/2022 8:25 AM

## 2022-10-04 ENCOUNTER — LAB (OUTPATIENT)
Dept: LAB | Facility: CLINIC | Age: 67
End: 2022-10-04
Payer: COMMERCIAL

## 2022-10-04 ENCOUNTER — OFFICE VISIT (OUTPATIENT)
Dept: GASTROENTEROLOGY | Facility: CLINIC | Age: 67
End: 2022-10-04
Attending: INTERNAL MEDICINE
Payer: COMMERCIAL

## 2022-10-04 VITALS
HEART RATE: 100 BPM | OXYGEN SATURATION: 97 % | RESPIRATION RATE: 16 BRPM | WEIGHT: 183 LBS | TEMPERATURE: 98.2 F | BODY MASS INDEX: 25.62 KG/M2 | SYSTOLIC BLOOD PRESSURE: 138 MMHG | HEIGHT: 71 IN | DIASTOLIC BLOOD PRESSURE: 70 MMHG

## 2022-10-04 DIAGNOSIS — K74.60 CIRRHOSIS OF LIVER WITHOUT ASCITES, UNSPECIFIED HEPATIC CIRRHOSIS TYPE (H): ICD-10-CM

## 2022-10-04 DIAGNOSIS — K74.60 CIRRHOSIS OF LIVER WITHOUT ASCITES, UNSPECIFIED HEPATIC CIRRHOSIS TYPE (H): Primary | ICD-10-CM

## 2022-10-04 DIAGNOSIS — C22.0 HCC (HEPATOCELLULAR CARCINOMA) (H): ICD-10-CM

## 2022-10-04 LAB
AFP SERPL-MCNC: 5.8 NG/ML
ALBUMIN SERPL BCG-MCNC: 3.5 G/DL (ref 3.5–5.2)
ALP SERPL-CCNC: 345 U/L (ref 40–129)
ALT SERPL W P-5'-P-CCNC: 34 U/L (ref 10–50)
ANION GAP SERPL CALCULATED.3IONS-SCNC: 8 MMOL/L (ref 7–15)
AST SERPL W P-5'-P-CCNC: 60 U/L (ref 10–50)
BILIRUB DIRECT SERPL-MCNC: 0.5 MG/DL (ref 0–0.3)
BILIRUB SERPL-MCNC: 1.3 MG/DL
BUN SERPL-MCNC: 7.7 MG/DL (ref 8–23)
CALCIUM SERPL-MCNC: 9.3 MG/DL (ref 8.8–10.2)
CHLORIDE SERPL-SCNC: 105 MMOL/L (ref 98–107)
CREAT SERPL-MCNC: 0.54 MG/DL (ref 0.67–1.17)
DEPRECATED HCO3 PLAS-SCNC: 25 MMOL/L (ref 22–29)
ERYTHROCYTE [DISTWIDTH] IN BLOOD BY AUTOMATED COUNT: 17.1 % (ref 10–15)
GFR SERPL CREATININE-BSD FRML MDRD: >90 ML/MIN/1.73M2
GLUCOSE SERPL-MCNC: 225 MG/DL (ref 70–99)
HCT VFR BLD AUTO: 39.3 % (ref 40–53)
HGB BLD-MCNC: 13.2 G/DL (ref 13.3–17.7)
INR PPP: 1.18 (ref 0.85–1.15)
MCH RBC QN AUTO: 31.4 PG (ref 26.5–33)
MCHC RBC AUTO-ENTMCNC: 33.6 G/DL (ref 31.5–36.5)
MCV RBC AUTO: 94 FL (ref 78–100)
PLATELET # BLD AUTO: 83 10E3/UL (ref 150–450)
POTASSIUM SERPL-SCNC: 4.1 MMOL/L (ref 3.4–5.3)
PROT SERPL-MCNC: 6.1 G/DL (ref 6.4–8.3)
RBC # BLD AUTO: 4.2 10E6/UL (ref 4.4–5.9)
SODIUM SERPL-SCNC: 138 MMOL/L (ref 136–145)
WBC # BLD AUTO: 10.3 10E3/UL (ref 4–11)

## 2022-10-04 PROCEDURE — 36415 COLL VENOUS BLD VENIPUNCTURE: CPT | Performed by: PATHOLOGY

## 2022-10-04 PROCEDURE — 85610 PROTHROMBIN TIME: CPT | Performed by: PATHOLOGY

## 2022-10-04 PROCEDURE — 82105 ALPHA-FETOPROTEIN SERUM: CPT | Performed by: INTERNAL MEDICINE

## 2022-10-04 PROCEDURE — 85027 COMPLETE CBC AUTOMATED: CPT | Performed by: PATHOLOGY

## 2022-10-04 PROCEDURE — 82248 BILIRUBIN DIRECT: CPT | Performed by: PATHOLOGY

## 2022-10-04 PROCEDURE — 99214 OFFICE O/P EST MOD 30 MIN: CPT | Performed by: INTERNAL MEDICINE

## 2022-10-04 PROCEDURE — G0463 HOSPITAL OUTPT CLINIC VISIT: HCPCS

## 2022-10-04 PROCEDURE — 80053 COMPREHEN METABOLIC PANEL: CPT | Performed by: PATHOLOGY

## 2022-10-04 RX ORDER — LISINOPRIL 20 MG/1
20 TABLET ORAL DAILY
COMMUNITY
Start: 2022-09-21

## 2022-10-04 ASSESSMENT — PAIN SCALES - GENERAL: PAINLEVEL: MILD PAIN (3)

## 2022-10-04 NOTE — PROGRESS NOTES
HISTORY OF PRESENT ILLNESS:  I had the pleasure of seeing Mehdi Martinez for followup in the Liver Transplant Clinic at the [facility] on 10/04/2022.  Mr. 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  He did recently undergo an ablation of his HCC percutaneously.    He is doing much better at this visit.  He has been getting ongoing physical therapy and is significantly stronger and can walk without assistance.  His physical therapists are taking a week off to see how well he does.  He does go outside and walk frequently.    He denies any abdominal pain, itching or skin rash.  He still has fatigue.  He denies any increased abdominal girth or lower extremity edema.  He has not had any gastrointestinal bleeding or any overt signs of hepatic encephalopathy.  He did have another episode where he experienced a fall with head laceration and was hospitalized in [place] for that.    He denies any fevers or chills, cough or shortness of breath.  He is fully vaccinated against COVID-19.  He denies any nausea or vomiting and is having upwards of 5 bowel movements per day.  He does occasionally have some fecal incontinence with that.  He does report his appetite has been good, and his weight has been stable.    Current Outpatient Medications   Medication     aMILoride (MIDAMOR) 5 MG tablet     Bioflavonoid Products (VITAMIN C) CHEW     cetirizine (ZYRTEC) 10 MG tablet     fluticasone (FLONASE) 50 MCG/ACT nasal spray     folic acid (FOLVITE) 1 MG tablet     lactulose encephalopathy (CHRONULAC) 10 GM/15ML SOLUTION     lisinopril (ZESTRIL) 20 MG tablet     metFORMIN (GLUCOPHAGE) 1000 MG tablet     methotrexate 2.5 MG tablet     pantoprazole (PROTONIX) 40 MG EC tablet     rifaximin (XIFAXAN) 550 MG TABS tablet     zinc sulfate (ZINCATE) 220 MG capsule     ondansetron (ZOFRAN) 4 MG tablet     Pseudoephedrine-APAP  MG TABS      "tiZANidine (ZANAFLEX) 4 MG tablet     No current facility-administered medications for this visit.     /70 (BP Location: Right arm, Patient Position: Sitting, Cuff Size: Adult Regular)   Pulse 100   Temp 98.2  F (36.8  C) (Oral)   Resp 16   Ht 1.803 m (5' 10.98\")   Wt 83 kg (183 lb)   SpO2 97%   BMI 25.53 kg/m      PHYSICAL EXAMINATION:    GENERAL:  He looks significantly better than he did at his last visit.  HEENT EXAMINATION:  Shows no scleral icterus or temporal muscle wasting.  CHEST:  Clear.  ABDOMINAL EXAMINATION:  Shows no increase in girth.  No masses or tenderness to palpation are present.  His liver is 10 cm in span without left lobe enlargement.  No spleen tip is palpable.  EXTREMITY EXAMINATION:  Shows no edema.  SKIN EXAMINATION:  Shows no stigmata of chronic liver disease.  NEUROLOGIC EXAMINATION:  Shows no asterixis.    Recent Results (from the past 168 hour(s))   Hepatic Panel [LAB20]    Collection Time: 10/04/22 10:25 AM   Result Value Ref Range    Protein Total 6.1 (L) 6.4 - 8.3 g/dL    Albumin 3.5 3.5 - 5.2 g/dL    Bilirubin Total 1.3 (H) <=1.2 mg/dL    Alkaline Phosphatase 345 (H) 40 - 129 U/L    AST 60 (H) 10 - 50 U/L    ALT 34 10 - 50 U/L    Bilirubin Direct 0.50 (H) 0.00 - 0.30 mg/dL   Basic metabolic panel [LAB15]    Collection Time: 10/04/22 10:25 AM   Result Value Ref Range    Sodium 138 136 - 145 mmol/L    Potassium 4.1 3.4 - 5.3 mmol/L    Chloride 105 98 - 107 mmol/L    Carbon Dioxide (CO2) 25 22 - 29 mmol/L    Anion Gap 8 7 - 15 mmol/L    Urea Nitrogen 7.7 (L) 8.0 - 23.0 mg/dL    Creatinine 0.54 (L) 0.67 - 1.17 mg/dL    Calcium 9.3 8.8 - 10.2 mg/dL    Glucose 225 (H) 70 - 99 mg/dL    GFR Estimate >90 >60 mL/min/1.73m2   CBC with platelets [MRL721]    Collection Time: 10/04/22 10:25 AM   Result Value Ref Range    WBC Count 10.3 4.0 - 11.0 10e3/uL    RBC Count 4.20 (L) 4.40 - 5.90 10e6/uL    Hemoglobin 13.2 (L) 13.3 - 17.7 g/dL    Hematocrit 39.3 (L) 40.0 - 53.0 %    MCV 94 " 78 - 100 fL    MCH 31.4 26.5 - 33.0 pg    MCHC 33.6 31.5 - 36.5 g/dL    RDW 17.1 (H) 10.0 - 15.0 %    Platelet Count 83 (L) 150 - 450 10e3/uL   INR [PED4722]    Collection Time: 10/04/22 10:25 AM   Result Value Ref Range    INR 1.18 (H) 0.85 - 1.15      MELD-Na score: 9 at 10/4/2022 10:25 AM  MELD score: 9 at 10/4/2022 10:25 AM  Calculated from:  Serum Creatinine: 0.54 mg/dL (Using min of 1 mg/dL) at 10/4/2022 10:25 AM  Serum Sodium: 138 mmol/L (Using max of 137 mmol/L) at 10/4/2022 10:25 AM  Total Bilirubin: 1.3 mg/dL at 10/4/2022 10:25 AM  INR(ratio): 1.18 at 10/4/2022 10:25 AM  Age: 66 years    IMPRESSION:  Mr. Martinez is doing well.  He certainly is much better than he was at his last visit and I do think he is at the point where he could be reactive to transplant.  He is going to be getting repeat imaging in 1 month, and if that looks good, I am going to advocate for putting him back active on the wait list.  I did advise him that if he is having more than 5 bowel movements a day he can cut back a little bit on the lactulose.  He is on rifaximin as well.  I will follow up to see if he could get neuropsych testing.  He is otherwise up to date with regard to vaccines and other cancer screening and I will see him back in the clinic in 3 months.    Thank you very much for allowing me to participate in the care of this patient. If you have any questions regarding my recommendations, please do not hesitate to contact me.               Juancho Willard MD          Professor of Medicine    [employer] [facility]          Executive Medical Director, Solid Organ Transplant Program    [facility]

## 2022-10-04 NOTE — LETTER
10/4/2022         RE: Mehdi Martinez  [address] [facility] [address]      HISTORY OF PRESENT ILLNESS:  I had the pleasure of seeing Mehdi Martinez for followup in the Liver Transplant Clinic at the [facility] on 10/04/2022.  Mr. 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  He did recently undergo an ablation of his HCC percutaneously.    He is doing much better at this visit.  He has been getting ongoing physical therapy and is significantly stronger and can walk without assistance.  His physical therapists are taking a week off to see how well he does.  He does go outside and walk frequently.    He denies any abdominal pain, itching or skin rash.  He still has fatigue.  He denies any increased abdominal girth or lower extremity edema.  He has not had any gastrointestinal bleeding or any overt signs of hepatic encephalopathy.  He did have another episode where he experienced a fall with head laceration and was hospitalized in [place] for that.    He denies any fevers or chills, cough or shortness of breath.  He is fully vaccinated against COVID-19.  He denies any nausea or vomiting and is having upwards of 5 bowel movements per day.  He does occasionally have some fecal incontinence with that.  He does report his appetite has been good, and his weight has been stable.    Current Outpatient Medications   Medication     aMILoride (MIDAMOR) 5 MG tablet     Bioflavonoid Products (VITAMIN C) CHEW     cetirizine (ZYRTEC) 10 MG tablet     fluticasone (FLONASE) 50 MCG/ACT nasal spray     folic acid (FOLVITE) 1 MG tablet     lactulose encephalopathy (CHRONULAC) 10 GM/15ML SOLUTION     lisinopril (ZESTRIL) 20 MG tablet     metFORMIN (GLUCOPHAGE) 1000 MG tablet     methotrexate 2.5 MG tablet     pantoprazole (PROTONIX) 40 MG EC tablet     rifaximin (XIFAXAN) 550 MG TABS tablet     zinc sulfate (ZINCATE) 220 MG  "capsule     ondansetron (ZOFRAN) 4 MG tablet     Pseudoephedrine-APAP  MG TABS     tiZANidine (ZANAFLEX) 4 MG tablet     No current facility-administered medications for this visit.     /70 (BP Location: Right arm, Patient Position: Sitting, Cuff Size: Adult Regular)   Pulse 100   Temp 98.2  F (36.8  C) (Oral)   Resp 16   Ht 1.803 m (5' 10.98\")   Wt 83 kg (183 lb)   SpO2 97%   BMI 25.53 kg/m      PHYSICAL EXAMINATION:    GENERAL:  He looks significantly better than he did at his last visit.  HEENT EXAMINATION:  Shows no scleral icterus or temporal muscle wasting.  CHEST:  Clear.  ABDOMINAL EXAMINATION:  Shows no increase in girth.  No masses or tenderness to palpation are present.  His liver is 10 cm in span without left lobe enlargement.  No spleen tip is palpable.  EXTREMITY EXAMINATION:  Shows no edema.  SKIN EXAMINATION:  Shows no stigmata of chronic liver disease.  NEUROLOGIC EXAMINATION:  Shows no asterixis.    Recent Results (from the past 168 hour(s))   Hepatic Panel [LAB20]    Collection Time: 10/04/22 10:25 AM   Result Value Ref Range    Protein Total 6.1 (L) 6.4 - 8.3 g/dL    Albumin 3.5 3.5 - 5.2 g/dL    Bilirubin Total 1.3 (H) <=1.2 mg/dL    Alkaline Phosphatase 345 (H) 40 - 129 U/L    AST 60 (H) 10 - 50 U/L    ALT 34 10 - 50 U/L    Bilirubin Direct 0.50 (H) 0.00 - 0.30 mg/dL   Basic metabolic panel [LAB15]    Collection Time: 10/04/22 10:25 AM   Result Value Ref Range    Sodium 138 136 - 145 mmol/L    Potassium 4.1 3.4 - 5.3 mmol/L    Chloride 105 98 - 107 mmol/L    Carbon Dioxide (CO2) 25 22 - 29 mmol/L    Anion Gap 8 7 - 15 mmol/L    Urea Nitrogen 7.7 (L) 8.0 - 23.0 mg/dL    Creatinine 0.54 (L) 0.67 - 1.17 mg/dL    Calcium 9.3 8.8 - 10.2 mg/dL    Glucose 225 (H) 70 - 99 mg/dL    GFR Estimate >90 >60 mL/min/1.73m2   CBC with platelets [[id number]]    Collection Time: 10/04/22 10:25 AM   Result Value Ref Range    WBC Count 10.3 4.0 - 11.0 10e3/uL    RBC Count 4.20 (L) 4.40 - 5.90 10e6/uL "    Hemoglobin 13.2 (L) 13.3 - 17.7 g/dL    Hematocrit 39.3 (L) 40.0 - 53.0 %    MCV 94 78 - 100 fL    MCH 31.4 26.5 - 33.0 pg    MCHC 33.6 31.5 - 36.5 g/dL    RDW 17.1 (H) 10.0 - 15.0 %    Platelet Count 83 (L) 150 - 450 10e3/uL   INR [[id number]]    Collection Time: 10/04/22 10:25 AM   Result Value Ref Range    INR 1.18 (H) 0.85 - 1.15      MELD-Na score: 9 at 10/4/2022 10:25 AM  MELD score: 9 at 10/4/2022 10:25 AM  Calculated from:  Serum Creatinine: 0.54 mg/dL (Using min of 1 mg/dL) at 10/4/2022 10:25 AM  Serum Sodium: 138 mmol/L (Using max of 137 mmol/L) at 10/4/2022 10:25 AM  Total Bilirubin: 1.3 mg/dL at 10/4/2022 10:25 AM  INR(ratio): 1.18 at 10/4/2022 10:25 AM  Age: 66 years    IMPRESSION:  Mr. Martinez is doing well.  He certainly is much better than he was at his last visit and I do think he is at the point where he could be reactive to transplant.  He is going to be getting repeat imaging in 1 month, and if that looks good, I am going to advocate for putting him back active on the wait list.  I did advise him that if he is having more than 5 bowel movements a day he can cut back a little bit on the lactulose.  He is on rifaximin as well.  I will follow up to see if he could get neuropsych testing.  He is otherwise up to date with regard to vaccines and other cancer screening and I will see him back in the clinic in 3 months.    Thank you very much for allowing me to participate in the care of this patient. If you have any questions regarding my recommendations, please do not hesitate to contact me.               Juancho Willard MD          Professor of Medicine    [facility] [employer]          Executive Medical Director, Solid Organ Transplant Program    [facility]

## 2022-10-04 NOTE — NURSING NOTE
"Chief Complaint   Patient presents with     RECHECK     Cirrhosis     Vital signs:  Temp: 98.2  F (36.8  C) Temp src: Oral BP: 138/70 Pulse: 100   Resp: 16 SpO2: 97 %     Height: 180.3 cm (5' 10.98\") Weight: 83 kg (183 lb)  Estimated body mass index is 25.53 kg/m  as calculated from the following:    Height as of this encounter: 1.803 m (5' 10.98\").    Weight as of this encounter: 83 kg (183 lb).        Patrica Villegas [facility]  10/4/2022 11:08 AM      "

## 2022-10-06 ENCOUNTER — TELEPHONE (OUTPATIENT)
Dept: RADIOLOGY | Facility: CLINIC | Age: 67
End: 2022-10-06

## 2022-10-06 NOTE — TELEPHONE ENCOUNTER
Called and spoke with patient regarding his procedure on 9/27. Patient reports he's feeling good, denies abdominal pain, nausea, vomiting or fevers. Patient is currently scheduled for his follow up imaging and appointment with Dr. Lai.     Zahira MCHUGH RN, BSN   Interventional Radiology [facility]   [phone]

## 2022-10-07 ENCOUNTER — TELEPHONE (OUTPATIENT)
Dept: TRANSPLANT | Facility: CLINIC | Age: 67
End: 2022-10-07

## 2022-10-07 DIAGNOSIS — Z01.818 ENCOUNTER FOR OTHER PREPROCEDURAL EXAMINATION: ICD-10-CM

## 2022-10-07 DIAGNOSIS — Z12.5 ENCOUNTER FOR SCREENING FOR MALIGNANT NEOPLASM OF PROSTATE: ICD-10-CM

## 2022-10-07 DIAGNOSIS — E08.40 DIABETES MELLITUS DUE TO UNDERLYING CONDITION WITH DIABETIC NEUROPATHY, UNSPECIFIED (H): ICD-10-CM

## 2022-10-07 DIAGNOSIS — C22.0 HEPATOCELLULAR CARCINOMA (H): ICD-10-CM

## 2022-10-07 DIAGNOSIS — M80.08XA AGE-RELATED OSTEOPOROSIS WITH CURRENT PATHOLOGICAL FRACTURE, VERTEBRA(E), INITIAL ENCOUNTER FOR FRACTURE (H): ICD-10-CM

## 2022-10-07 DIAGNOSIS — K70.31 ALCOHOLIC CIRRHOSIS OF LIVER WITH ASCITES (H): ICD-10-CM

## 2022-10-07 DIAGNOSIS — E11.65 TYPE 2 DIABETES MELLITUS WITH HYPERGLYCEMIA (H): ICD-10-CM

## 2022-10-07 NOTE — TELEPHONE ENCOUNTER
Neuropsych has been trying to reach him for scheduling appts.    Advised to call wife's phone as she handles appt schedules, he often does not answer phone and has been struggling off/on with HE (IMproving)    Also, patient due for annual re-eval, will get those scheduled prior to reactivation (discussed with Dr. Willard); orders sent to scheduling

## 2022-10-27 ENCOUNTER — ANCILLARY PROCEDURE (OUTPATIENT)
Dept: ULTRASOUND IMAGING | Facility: CLINIC | Age: 67
End: 2022-10-27
Attending: RADIOLOGY
Payer: COMMERCIAL

## 2022-10-27 ENCOUNTER — ANCILLARY PROCEDURE (OUTPATIENT)
Dept: MRI IMAGING | Facility: CLINIC | Age: 67
End: 2022-10-27
Attending: RADIOLOGY
Payer: COMMERCIAL

## 2022-10-27 DIAGNOSIS — C22.0 HCC (HEPATOCELLULAR CARCINOMA) (H): ICD-10-CM

## 2022-10-27 PROCEDURE — A9585 GADOBUTROL INJECTION: HCPCS | Performed by: STUDENT IN AN ORGANIZED HEALTH CARE EDUCATION/TRAINING PROGRAM

## 2022-10-27 PROCEDURE — 74183 MRI ABD W/O CNTR FLWD CNTR: CPT | Performed by: STUDENT IN AN ORGANIZED HEALTH CARE EDUCATION/TRAINING PROGRAM

## 2022-10-27 PROCEDURE — 93975 VASCULAR STUDY: CPT | Mod: GC | Performed by: RADIOLOGY

## 2022-10-27 RX ORDER — GADOBUTROL 604.72 MG/ML
10 INJECTION INTRAVENOUS ONCE
Status: COMPLETED | OUTPATIENT
Start: 2022-10-27 | End: 2022-10-27

## 2022-10-27 RX ADMIN — GADOBUTROL 8.5 ML: 604.72 INJECTION INTRAVENOUS at 11:42

## 2022-11-03 ENCOUNTER — ANCILLARY PROCEDURE (OUTPATIENT)
Dept: BONE DENSITY | Facility: CLINIC | Age: 67
End: 2022-11-03
Attending: INTERNAL MEDICINE
Payer: COMMERCIAL

## 2022-11-03 ENCOUNTER — LAB (OUTPATIENT)
Dept: LAB | Facility: CLINIC | Age: 67
End: 2022-11-03
Attending: INTERNAL MEDICINE
Payer: COMMERCIAL

## 2022-11-03 ENCOUNTER — ANCILLARY PROCEDURE (OUTPATIENT)
Dept: CARDIOLOGY | Facility: CLINIC | Age: 67
End: 2022-11-03
Attending: INTERNAL MEDICINE
Payer: COMMERCIAL

## 2022-11-03 ENCOUNTER — OFFICE VISIT (OUTPATIENT)
Dept: TRANSPLANT | Facility: CLINIC | Age: 67
End: 2022-11-03
Attending: INTERNAL MEDICINE
Payer: COMMERCIAL

## 2022-11-03 ENCOUNTER — ANCILLARY PROCEDURE (OUTPATIENT)
Dept: CT IMAGING | Facility: CLINIC | Age: 67
End: 2022-11-03
Attending: INTERNAL MEDICINE
Payer: COMMERCIAL

## 2022-11-03 VITALS
WEIGHT: 176 LBS | DIASTOLIC BLOOD PRESSURE: 82 MMHG | BODY MASS INDEX: 24.56 KG/M2 | OXYGEN SATURATION: 98 % | HEART RATE: 96 BPM | SYSTOLIC BLOOD PRESSURE: 118 MMHG

## 2022-11-03 DIAGNOSIS — E11.65 TYPE 2 DIABETES MELLITUS WITH HYPERGLYCEMIA (H): ICD-10-CM

## 2022-11-03 DIAGNOSIS — C22.0 HEPATOCELLULAR CARCINOMA (H): ICD-10-CM

## 2022-11-03 DIAGNOSIS — K70.31 ALCOHOLIC CIRRHOSIS OF LIVER WITH ASCITES (H): ICD-10-CM

## 2022-11-03 DIAGNOSIS — Z01.818 ENCOUNTER FOR OTHER PREPROCEDURAL EXAMINATION: ICD-10-CM

## 2022-11-03 DIAGNOSIS — E08.40 DIABETES MELLITUS DUE TO UNDERLYING CONDITION WITH DIABETIC NEUROPATHY, UNSPECIFIED (H): ICD-10-CM

## 2022-11-03 DIAGNOSIS — R54 FRAILTY: ICD-10-CM

## 2022-11-03 DIAGNOSIS — Z12.5 ENCOUNTER FOR SCREENING FOR MALIGNANT NEOPLASM OF PROSTATE: ICD-10-CM

## 2022-11-03 DIAGNOSIS — Z91.81 HISTORY OF FALLING, PRESENTING HAZARDS TO HEALTH: ICD-10-CM

## 2022-11-03 DIAGNOSIS — M80.08XA AGE-RELATED OSTEOPOROSIS WITH CURRENT PATHOLOGICAL FRACTURE, VERTEBRA(E), INITIAL ENCOUNTER FOR FRACTURE (H): ICD-10-CM

## 2022-11-03 DIAGNOSIS — Z01.818 ENCOUNTER FOR PRE-TRANSPLANT EVALUATION FOR LIVER TRANSPLANT: Primary | ICD-10-CM

## 2022-11-03 LAB
AFP SERPL-MCNC: 5.5 NG/ML
ALBUMIN SERPL BCG-MCNC: 3.6 G/DL (ref 3.5–5.2)
ALBUMIN UR-MCNC: NEGATIVE MG/DL
ALP SERPL-CCNC: 221 U/L (ref 40–129)
ALT SERPL W P-5'-P-CCNC: 20 U/L (ref 10–50)
ANION GAP SERPL CALCULATED.3IONS-SCNC: 13 MMOL/L (ref 7–15)
APPEARANCE UR: CLEAR
AST SERPL W P-5'-P-CCNC: 54 U/L (ref 10–50)
ATRIAL RATE - MUSE: 88 BPM
BILIRUB DIRECT SERPL-MCNC: 0.61 MG/DL (ref 0–0.3)
BILIRUB SERPL-MCNC: 1.7 MG/DL
BILIRUB UR QL STRIP: ABNORMAL
BUN SERPL-MCNC: 9.4 MG/DL (ref 8–23)
CALCIUM SERPL-MCNC: 9.8 MG/DL (ref 8.8–10.2)
CHLORIDE SERPL-SCNC: 106 MMOL/L (ref 98–107)
COLOR UR AUTO: YELLOW
CREAT SERPL-MCNC: 0.56 MG/DL (ref 0.67–1.17)
DEPRECATED HCO3 PLAS-SCNC: 21 MMOL/L (ref 22–29)
DIASTOLIC BLOOD PRESSURE - MUSE: NORMAL MMHG
ERYTHROCYTE [DISTWIDTH] IN BLOOD BY AUTOMATED COUNT: 17.1 % (ref 10–15)
GFR SERPL CREATININE-BSD FRML MDRD: >90 ML/MIN/1.73M2
GLUCOSE SERPL-MCNC: 113 MG/DL (ref 70–99)
GLUCOSE UR STRIP-MCNC: NEGATIVE MG/DL
HBA1C MFR BLD: 6.8 %
HCT VFR BLD AUTO: 41.9 % (ref 40–53)
HGB BLD-MCNC: 13.8 G/DL (ref 13.3–17.7)
HGB UR QL STRIP: NEGATIVE
HYALINE CASTS: 1 /LPF
INR PPP: 1.21 (ref 0.85–1.15)
INTERPRETATION ECG - MUSE: NORMAL
KETONES UR STRIP-MCNC: 5 MG/DL
LEUKOCYTE ESTERASE UR QL STRIP: NEGATIVE
LVEF ECHO: NORMAL
MCH RBC QN AUTO: 30.9 PG (ref 26.5–33)
MCHC RBC AUTO-ENTMCNC: 32.9 G/DL (ref 31.5–36.5)
MCV RBC AUTO: 94 FL (ref 78–100)
MUCOUS THREADS #/AREA URNS LPF: PRESENT /LPF
NITRATE UR QL: NEGATIVE
P AXIS - MUSE: 76 DEGREES
PH UR STRIP: 6 [PH] (ref 5–7)
PHOSPHATE SERPL-MCNC: 3.4 MG/DL (ref 2.5–4.5)
PLATELET # BLD AUTO: 112 10E3/UL (ref 150–450)
POTASSIUM SERPL-SCNC: 4.4 MMOL/L (ref 3.4–5.3)
PR INTERVAL - MUSE: 156 MS
PROT SERPL-MCNC: 6.2 G/DL (ref 6.4–8.3)
PSA SERPL-MCNC: 1.74 NG/ML (ref 0–4.5)
QRS DURATION - MUSE: 78 MS
QT - MUSE: 360 MS
QTC - MUSE: 435 MS
R AXIS - MUSE: 31 DEGREES
RBC # BLD AUTO: 4.46 10E6/UL (ref 4.4–5.9)
RBC URINE: 1 /HPF
SODIUM SERPL-SCNC: 140 MMOL/L (ref 136–145)
SP GR UR STRIP: 1.03 (ref 1–1.03)
SYSTOLIC BLOOD PRESSURE - MUSE: NORMAL MMHG
T AXIS - MUSE: 46 DEGREES
UROBILINOGEN UR STRIP-MCNC: NORMAL MG/DL
VENTRICULAR RATE- MUSE: 88 BPM
WBC # BLD AUTO: 13.5 10E3/UL (ref 4–11)
WBC URINE: 1 /HPF

## 2022-11-03 PROCEDURE — 99213 OFFICE O/P EST LOW 20 MIN: CPT | Performed by: TRANSPLANT SURGERY

## 2022-11-03 PROCEDURE — 83036 HEMOGLOBIN GLYCOSYLATED A1C: CPT | Performed by: INTERNAL MEDICINE

## 2022-11-03 PROCEDURE — 77080 DXA BONE DENSITY AXIAL: CPT | Performed by: INTERNAL MEDICINE

## 2022-11-03 PROCEDURE — 71250 CT THORAX DX C-: CPT | Mod: GC | Performed by: RADIOLOGY

## 2022-11-03 PROCEDURE — 82248 BILIRUBIN DIRECT: CPT | Performed by: PATHOLOGY

## 2022-11-03 PROCEDURE — 93306 TTE W/DOPPLER COMPLETE: CPT | Performed by: INTERNAL MEDICINE

## 2022-11-03 PROCEDURE — 99000 SPECIMEN HANDLING OFFICE-LAB: CPT | Performed by: PATHOLOGY

## 2022-11-03 PROCEDURE — 99207 PR NO CHARGE COORDINATED CARE PS: CPT

## 2022-11-03 PROCEDURE — 36415 COLL VENOUS BLD VENIPUNCTURE: CPT | Performed by: PATHOLOGY

## 2022-11-03 PROCEDURE — 82105 ALPHA-FETOPROTEIN SERUM: CPT | Performed by: INTERNAL MEDICINE

## 2022-11-03 PROCEDURE — G0103 PSA SCREENING: HCPCS | Performed by: INTERNAL MEDICINE

## 2022-11-03 PROCEDURE — 85610 PROTHROMBIN TIME: CPT | Performed by: PATHOLOGY

## 2022-11-03 PROCEDURE — 80053 COMPREHEN METABOLIC PANEL: CPT | Performed by: PATHOLOGY

## 2022-11-03 PROCEDURE — 80321 ALCOHOLS BIOMARKERS 1OR 2: CPT | Mod: 90 | Performed by: PATHOLOGY

## 2022-11-03 PROCEDURE — 84100 ASSAY OF PHOSPHORUS: CPT | Performed by: PATHOLOGY

## 2022-11-03 PROCEDURE — 81001 URINALYSIS AUTO W/SCOPE: CPT | Performed by: PATHOLOGY

## 2022-11-03 PROCEDURE — 85027 COMPLETE CBC AUTOMATED: CPT | Performed by: PATHOLOGY

## 2022-11-03 NOTE — PROGRESS NOTES
Patient Name: Mehdi Martinez  : 1955  Age: 67 year old  MRN: [id number]  Date of Initial Social Work Evaluation: 21    Cody is INACTIVE on our liver transplant wait list.  I saw Cody today to update his psychosocial assessment.  He attended today's appointments alone.      Presenting Information   Living Situation: Cody lives with his wife Tameka in a house in [place], [place].  Their twenty year old grandson Abel also lives with them. Mehdi reports he manages his own medications and personal cares.   If not local, plans for short term stay: n/a  Functional Status: Cody's functional status has changed significantly since I saw him in 2021.  He reports 5-6 falls over the past year, most recently a couple of weeks ago.  He is using a cane.  Cultural/Language/Spiritual Considerations: Anabaptism, English is primary language    Support System  Primary Support Person: wife Tameka  Other support:  Daughters, Marielle and Dixie, both live in [place].  Not able to provide much assistance.  Grandson (age 20) assists with some household tasks.  Plan for support in immediate post-transplant period: I spoke with patient's wife Tameka on 21 and provided post transplant care giving education and confirmed her willingness to provide post transplant care giving.  She was not present today, therefore we will need to review post transplant care giving.    Health Care Directive  Decision Maker: patient  Alternate Decision Maker: wife Peggy is patient's next of kin  Health Care Directive: Provided education    Mental Health/Coping:   History of Mental Health: Mehdi denies any mental health history.   History of Chemical Health: Mehdi has abstained from alcohol for seven years.  He is very proud of his sobriety.  Current status: stable  Coping: appropriate to situation  Services Needed/Recommended: Liver transplant support group.    Financial   Income: Mehdi reports having multiple sources of income,  including a pension from [employer].  Mehdi and his wife both receive Social Security senior care benefits.  Insurance and medication coverage: [employer] Medicare Advantage.   Mehdi uses [facility] for his prescriptions.  Financial concerns: Cody denies any concerns, though he reports his wife did not want to attend today's appointments because she would have to pay for parking.  Resources needed: ongoing assessment    Education provided by SW: Social Work role in transplant program, availability of liver transplant support group    Assessment and recommendations and plan:   I am concerned about patient's fall history and decline in his functional status.  Neuropsychological testing is scheduled for December 6, 2022.    I will need to confirm Cody's post transplant care giving plan if patient is recommended for reactivation.        EMILY Braga, [facility]  Liver Transplant   Phone [phone]  Pager [phone]

## 2022-11-03 NOTE — PROGRESS NOTES
Seen for possible reactivation for LT:  HCCa after HCV infx.  Low native MELD.  Imp.  He currently has too many unresolved issues for reactivation.  1. Falling episodes: He was inactivated after fall and injury.  3/22, he had another non-con head CT in 8/22 (no bleed) after he fell again with head trauma.   He continues to fall (recently). He notes that he has fallen multiple times since March 2022l.  This needs formal evaluation prior to reactivation: neuro, cards/rhythm and rehab.   Need to know why he keeps falling.  2. HCCa:  Not sure if related to falls, but should prob have a contrast head CT.  He also has significant L chest wall pain.  (neg bone scan in 11/21 and CT today without significant bony path).  Likely trauma from falling, but should also consider mets. (AFP not high).  3. Frailty: I did not do a formal frailty assessment, but he is not robust. Could be the cause of falling, but falling may have a neuro source.  He should be seen by dietician and rehab to establish formal frailty assessment and nutritional evaluation & plan for maintenance/improvement of muscle strength.  He looks very unfit for transplant today.  4. Social support: SW to see. He and wife live in separate places, despite her helping support him.    5. Carries additional dx of CLL,  Prostate ca, DM (HgbA1c 8.5)    I saw Mr Martinez for 15 min, 10 min prep/chart review and 10 min documentation/lab and image review.

## 2022-11-03 NOTE — LETTER
11/3/2022         RE: Mehdi Martinez  [address] [place] [address]        Dear Colleague,    Thank you for referring your patient, Mehdi Martinez, to the [facility]PL[facility]. Please see a copy of my visit note below.    Seen for possible reactivation for LT:  HCCa after HCV infx.  Low native MELD.  Imp.  He currently has too many unresolved issues for reactivation.  1. Falling episodes: He was inactivated after fall and injury.  3/22, he had another non-con head CT in 8/22 (no bleed) after he fell again with head trauma.   He continues to fall (recently). He notes that he has fallen multiple times since March 2022l.  This needs formal evaluation prior to reactivation: neuro, cards/rhythm and rehab.   Need to know why he keeps falling.  2. HCCa:  Not sure if related to falls, but should prob have a contrast head CT.  He also has significant L chest wall pain.  (neg bone scan in 11/21 and CT today without significant bony path).  Likely trauma from falling, but should also consider mets. (AFP not high).  3. Frailty: I did not do a formal frailty assessment, but he is not robust. Could be the cause of falling, but falling may have a neuro source.  He should be seen by dietician and rehab to establish formal frailty assessment and nutritional evaluation & plan for maintenance/improvement of muscle strength.  He looks very unfit for transplant today.  4. Social support: SW to see. He and wife live in separate places, despite her helping support him.    5. Carries additional dx of CLL,  Prostate ca, DM (HgbA1c 8.5)    I saw Mr Martinez for 15 min, 10 min prep/chart review and 10 min documentation/lab and image review.    Again, thank you for allowing me to participate in the care of your patient.      Sincerely,      Bo Bal MD

## 2022-11-03 NOTE — NURSING NOTE
Chief Complaint   Patient presents with     Transplant Waitlist Maintenance     LIVER TX WAIT LIST     Blood pressure 118/82, pulse 96, weight 79.8 kg (176 lb), SpO2 98 %.    Amena Dixon, CMA    
DISPLAY PLAN FREE TEXT

## 2022-11-04 ENCOUNTER — DOCUMENTATION ONLY (OUTPATIENT)
Dept: TRANSPLANT | Facility: CLINIC | Age: 67
End: 2022-11-04

## 2022-11-04 DIAGNOSIS — C22.0 HCC (HEPATOCELLULAR CARCINOMA) (H): ICD-10-CM

## 2022-11-04 DIAGNOSIS — K74.60 CIRRHOSIS OF LIVER (H): Primary | ICD-10-CM

## 2022-11-04 DIAGNOSIS — Z91.81 HISTORY OF RECENT FALL: ICD-10-CM

## 2022-11-04 NOTE — PROGRESS NOTES
MRI 10/27/22  1.) necrosis mainly, but slight arterial enhance - L3  2.) No HCC seen    Chest CT  1.) 7 mm nodule stable over 1 yr    Recs:   - 3 mo MRI HCC follow up

## 2022-11-06 LAB
PLPETH BLD-MCNC: <10 NG/ML
POPETH BLD-MCNC: <10 NG/ML

## 2022-11-09 ENCOUNTER — VIRTUAL VISIT (OUTPATIENT)
Dept: VASCULAR SURGERY | Facility: CLINIC | Age: 67
End: 2022-11-09
Payer: COMMERCIAL

## 2022-11-09 DIAGNOSIS — C22.0 HCC (HEPATOCELLULAR CARCINOMA) (H): ICD-10-CM

## 2022-11-09 DIAGNOSIS — Z95.828 S/P TIPS (TRANSJUGULAR INTRAHEPATIC PORTOSYSTEMIC SHUNT): Primary | ICD-10-CM

## 2022-11-09 PROCEDURE — 99213 OFFICE O/P EST LOW 20 MIN: CPT | Mod: 95 | Performed by: RADIOLOGY

## 2022-11-09 NOTE — PROGRESS NOTES
[facility]      Type of Visit: Virtual: Ivet    Patient is here for a return visit to discuss Tumor conf f/u.     Vitals - Patient Reported  Weight (Patient Reported): 80.7 kg (178 lb)  Pain Score: Moderate Pain (5)  Pain Loc: Chest (left side, mostly when laying down)      Questions patient would like addressed today are: Patient verbalized no questions/concerns, this has been communicated to the provider.     Refills are needed: No    How would you like to obtain your AVS? Mikal Duff MA

## 2022-11-09 NOTE — LETTER
11/9/2022       RE: Mehdi Martinez  [address] [place] [address]     Dear Colleague,    Thank you for referring your patient, Mehdi Martinez, to the [facility]. Please see a copy of my visit note below.    [facility]      Type of Visit: Virtual: AmWell    Patient is here for a return visit to discuss Tumor conf f/u.     Vitals - Patient Reported  Weight (Patient Reported): 80.7 kg (178 lb)  Pain Score: Moderate Pain (5)  Pain Loc: Chest (left side, mostly when laying down)      Questions patient would like addressed today are: Patient verbalized no questions/concerns, this has been communicated to the provider.     Refills are needed: No    How would you like to obtain your AVS? Mikal Duff MA    Mr. Martinez is interviewed via video virtual visit accompanied by his spouse for follow-up approximately 1 month after microwave ablation of the small tumor in segment 6.  He is doing well apart from continued challenges with some imbalance with walking which is contributing to falls.  He recently met with his transplant surgeon who is developing a plan to work-up this imbalance, in the meanwhile he has been temporarily deactivated from the transplant list.    In regards to his treatment, he had a unremarkable postoperative recovery, without any significant pain or discomfort.  Currently, he is back to his baseline.    Physical exam: No acute distress.  Slow speech.  Remainder of the physical exam deferred due to video virtual visit.    Imaging:    I reviewed the MRI of the liver which demonstrates:    1. Cirrhotic hepatic morphology with tip in place.  2. Postablation changes involving segments 6 with lateral peripheral  arterial enhancement along treatment zone, favor perfusion  related/transient hepatic intensity difference. LR-TR, equivocal.  3. Stable posttreatment changes hepatic  segment 4/8; LR-TR, nonviable.  4. Few sub 5 mm arterial enhancing observation without additional  worrisome imaging features.LIRADS 3. Consider attention on follow-up.  5. Stable T2 hyperintense observation in the pancreatic body, possibly  IPMN. Recommend continued attention on follow-up  6. Based on this exam, the patient is within Mehrdad criteria.    Labs:    Last Comprehensive Metabolic Panel:  Sodium   Date Value Ref Range Status   11/03/2022 140 136 - 145 mmol/L Final   03/18/2021 143 133 - 144 mmol/L Final     Potassium   Date Value Ref Range Status   11/03/2022 4.4 3.4 - 5.3 mmol/L Final   07/05/2022 3.5 3.4 - 5.3 mmol/L Final   03/18/2021 4.6 3.4 - 5.3 mmol/L Final     Chloride   Date Value Ref Range Status   11/03/2022 106 98 - 107 mmol/L Final   07/05/2022 110 (H) 94 - 109 mmol/L Final   03/18/2021 114 (H) 94 - 109 mmol/L Final     Carbon Dioxide   Date Value Ref Range Status   03/18/2021 25 20 - 32 mmol/L Final     Carbon Dioxide (CO2)   Date Value Ref Range Status   11/03/2022 21 (L) 22 - 29 mmol/L Final   07/05/2022 27 20 - 32 mmol/L Final     Anion Gap   Date Value Ref Range Status   11/03/2022 13 7 - 15 mmol/L Final   07/05/2022 3 3 - 14 mmol/L Final   03/18/2021 4 3 - 14 mmol/L Final     Glucose   Date Value Ref Range Status   11/03/2022 113 (H) 70 - 99 mg/dL Final   07/05/2022 128 (H) 70 - 99 mg/dL Final   03/18/2021 117 (H) 70 - 99 mg/dL Final     GLUCOSE BY METER POCT   Date Value Ref Range Status   09/27/2022 126 (H) 70 - 99 mg/dL Final     Urea Nitrogen   Date Value Ref Range Status   11/03/2022 9.4 8.0 - 23.0 mg/dL Final   07/05/2022 8 7 - 30 mg/dL Final   03/18/2021 10 7 - 30 mg/dL Final     Creatinine   Date Value Ref Range Status   11/03/2022 0.56 (L) 0.67 - 1.17 mg/dL Final   03/18/2021 0.70 0.66 - 1.25 mg/dL Final     GFR Estimate   Date Value Ref Range Status   11/03/2022 >90 >60 mL/min/1.73m2 Final     Comment:     Effective December 21, 2021 eGFRcr in adults is calculated using the  2021 CKD-EPI creatinine equation which includes age and gender (Silver et al., NEJM, DOI: 10.1056/VORWbm3086992)   03/18/2021 >90 >60 mL/min/[1.73_m2] Final     Comment:     Non  GFR Calc  Starting 12/18/2018, serum creatinine based estimated GFR (eGFR) will be   calculated using the Chronic Kidney Disease Epidemiology Collaboration   (CKD-EPI) equation.       Calcium   Date Value Ref Range Status   11/03/2022 9.8 8.8 - 10.2 mg/dL Final   03/18/2021 9.4 8.5 - 10.1 mg/dL Final     Bilirubin Total   Date Value Ref Range Status   11/03/2022 1.7 (H) <=1.2 mg/dL Final   03/18/2021 1.4 (H) 0.2 - 1.3 mg/dL Final     Alkaline Phosphatase   Date Value Ref Range Status   11/03/2022 221 (H) 40 - 129 U/L Final   03/18/2021 189 (H) 40 - 150 U/L Final     ALT   Date Value Ref Range Status   11/03/2022 20 10 - 50 U/L Final   03/18/2021 70 0 - 70 U/L Final     AST   Date Value Ref Range Status   11/03/2022 54 (H) 10 - 50 U/L Final   03/18/2021 84 (H) 0 - 45 U/L Final     A/P: 67-year-old male 1 month status post microwave ablation of a segment 6 LIRADS 4 lesion, likely HCC.    Doing well without any concerns for postoperative complication.  He continues to struggle with some coordination and imbalance, has been temporarily deactivated for the transplant list while this is being evaluated.  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    In regards to his HCC, we will follow-up again in 2 to 3 months with a repeat MRI and labs.        Sincerely,    Rohan Lai MD

## 2022-11-09 NOTE — NURSING NOTE
Chief Complaint   Patient presents with     Follow Up     Pt states vitals were taken last week/in chart     Patient declined individual allergy and medication review by support staff because patient denies any changes since echeck-in completion and states all information entered during echeck-in remains accurate.    Zach Duff, VF/CMA

## 2022-11-15 NOTE — PROGRESS NOTES
Mr. Martinez is interviewed via video virtual visit accompanied by his spouse for follow-up approximately 1 month after microwave ablation of the small tumor in segment 6.  He is doing well apart from continued challenges with some imbalance with walking which is contributing to falls.  He recently met with his transplant surgeon who is developing a plan to work-up this imbalance, in the meanwhile he has been temporarily deactivated from the transplant list.    In regards to his treatment, he had a unremarkable postoperative recovery, without any significant pain or discomfort.  Currently, he is back to his baseline.    Physical exam: No acute distress.  Slow speech.  Remainder of the physical exam deferred due to video virtual visit.    Imaging:    I reviewed the MRI of the liver which demonstrates:    1. Cirrhotic hepatic morphology with tip in place.  2. Postablation changes involving segments 6 with lateral peripheral  arterial enhancement along treatment zone, favor perfusion  related/transient hepatic intensity difference. LR-TR, equivocal.  3. Stable posttreatment changes hepatic segment 4/8; LR-TR, nonviable.  4. Few sub 5 mm arterial enhancing observation without additional  worrisome imaging features.LIRADS 3. Consider attention on follow-up.  5. Stable T2 hyperintense observation in the pancreatic body, possibly  IPMN. Recommend continued attention on follow-up  6. Based on this exam, the patient is within Mehrdad criteria.    Labs:    Last Comprehensive Metabolic Panel:  Sodium   Date Value Ref Range Status   11/03/2022 140 136 - 145 mmol/L Final   03/18/2021 143 133 - 144 mmol/L Final     Potassium   Date Value Ref Range Status   11/03/2022 4.4 3.4 - 5.3 mmol/L Final   07/05/2022 3.5 3.4 - 5.3 mmol/L Final   03/18/2021 4.6 3.4 - 5.3 mmol/L Final     Chloride   Date Value Ref Range Status   11/03/2022 106 98 - 107 mmol/L Final   07/05/2022 110 (H) 94 - 109 mmol/L Final   03/18/2021 114 (H) 94 - 109 mmol/L  Final     Carbon Dioxide   Date Value Ref Range Status   03/18/2021 25 20 - 32 mmol/L Final     Carbon Dioxide (CO2)   Date Value Ref Range Status   11/03/2022 21 (L) 22 - 29 mmol/L Final   07/05/2022 27 20 - 32 mmol/L Final     Anion Gap   Date Value Ref Range Status   11/03/2022 13 7 - 15 mmol/L Final   07/05/2022 3 3 - 14 mmol/L Final   03/18/2021 4 3 - 14 mmol/L Final     Glucose   Date Value Ref Range Status   11/03/2022 113 (H) 70 - 99 mg/dL Final   07/05/2022 128 (H) 70 - 99 mg/dL Final   03/18/2021 117 (H) 70 - 99 mg/dL Final     GLUCOSE BY METER POCT   Date Value Ref Range Status   09/27/2022 126 (H) 70 - 99 mg/dL Final     Urea Nitrogen   Date Value Ref Range Status   11/03/2022 9.4 8.0 - 23.0 mg/dL Final   07/05/2022 8 7 - 30 mg/dL Final   03/18/2021 10 7 - 30 mg/dL Final     Creatinine   Date Value Ref Range Status   11/03/2022 0.56 (L) 0.67 - 1.17 mg/dL Final   03/18/2021 0.70 0.66 - 1.25 mg/dL Final     GFR Estimate   Date Value Ref Range Status   11/03/2022 >90 >60 mL/min/1.73m2 Final     Comment:     Effective December 21, 2021 eGFRcr in adults is calculated using the 2021 CKD-EPI creatinine equation which includes age and gender (Silver et al., NEJM, DOI: 10.1056/MYMHbt8010493)   03/18/2021 >90 >60 mL/min/[1.73_m2] Final     Comment:     Non  GFR Calc  Starting 12/18/2018, serum creatinine based estimated GFR (eGFR) will be   calculated using the Chronic Kidney Disease Epidemiology Collaboration   (CKD-EPI) equation.       Calcium   Date Value Ref Range Status   11/03/2022 9.8 8.8 - 10.2 mg/dL Final   03/18/2021 9.4 8.5 - 10.1 mg/dL Final     Bilirubin Total   Date Value Ref Range Status   11/03/2022 1.7 (H) <=1.2 mg/dL Final   03/18/2021 1.4 (H) 0.2 - 1.3 mg/dL Final     Alkaline Phosphatase   Date Value Ref Range Status   11/03/2022 221 (H) 40 - 129 U/L Final   03/18/2021 189 (H) 40 - 150 U/L Final     ALT   Date Value Ref Range Status   11/03/2022 20 10 - 50 U/L Final   03/18/2021  70 0 - 70 U/L Final     AST   Date Value Ref Range Status   11/03/2022 54 (H) 10 - 50 U/L Final   03/18/2021 84 (H) 0 - 45 U/L Final     A/P: 67-year-old male 1 month status post microwave ablation of a segment 6 LIRADS 4 lesion, likely HCC.    Doing well without any concerns for postoperative complication.  He continues to struggle with some coordination and imbalance, has been temporarily deactivated for the transplant list while this is being evaluated.  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    In regards to his HCC, we will follow-up again in 2 to 3 months with a repeat MRI and labs.

## 2022-11-30 ENCOUNTER — HOSPITAL ENCOUNTER (OUTPATIENT)
Dept: NUCLEAR MEDICINE | Facility: CLINIC | Age: 67
Setting detail: NUCLEAR MEDICINE
Discharge: HOME | End: 2022-11-30
Attending: INTERNAL MEDICINE
Payer: COMMERCIAL

## 2022-11-30 ENCOUNTER — LAB (OUTPATIENT)
Dept: LAB | Facility: CLINIC | Age: 67
End: 2022-11-30
Attending: INTERNAL MEDICINE
Payer: COMMERCIAL

## 2022-11-30 DIAGNOSIS — Z91.81 HISTORY OF RECENT FALL: ICD-10-CM

## 2022-11-30 DIAGNOSIS — K74.60 CIRRHOSIS OF LIVER (H): ICD-10-CM

## 2022-11-30 DIAGNOSIS — B18.2 CHRONIC HEPATITIS C WITHOUT HEPATIC COMA (H): ICD-10-CM

## 2022-11-30 DIAGNOSIS — K70.30 ALCOHOLIC CIRRHOSIS (H): ICD-10-CM

## 2022-11-30 DIAGNOSIS — C22.0 HCC (HEPATOCELLULAR CARCINOMA) (H): ICD-10-CM

## 2022-11-30 LAB
AFP SERPL-MCNC: 5.2 NG/ML
ALBUMIN SERPL BCG-MCNC: 3.4 G/DL (ref 3.5–5.2)
ALP SERPL-CCNC: 206 U/L (ref 40–129)
ALT SERPL W P-5'-P-CCNC: 23 U/L (ref 10–50)
ANION GAP SERPL CALCULATED.3IONS-SCNC: 14 MMOL/L (ref 7–15)
AST SERPL W P-5'-P-CCNC: 62 U/L (ref 10–50)
BILIRUB DIRECT SERPL-MCNC: 0.49 MG/DL (ref 0–0.3)
BILIRUB SERPL-MCNC: 1.6 MG/DL
BUN SERPL-MCNC: 10.3 MG/DL (ref 8–23)
CALCIUM SERPL-MCNC: 9.7 MG/DL (ref 8.8–10.2)
CHLORIDE SERPL-SCNC: 101 MMOL/L (ref 98–107)
CREAT SERPL-MCNC: 0.62 MG/DL (ref 0.67–1.17)
DEPRECATED HCO3 PLAS-SCNC: 18 MMOL/L (ref 22–29)
GFR SERPL CREATININE-BSD FRML MDRD: >90 ML/MIN/1.73M2
GLUCOSE SERPL-MCNC: 144 MG/DL (ref 70–99)
INR PPP: 1.23 (ref 0.85–1.15)
POTASSIUM SERPL-SCNC: 4.4 MMOL/L (ref 3.4–5.3)
PROT SERPL-MCNC: 5.8 G/DL (ref 6.4–8.3)
SODIUM SERPL-SCNC: 133 MMOL/L (ref 136–145)

## 2022-11-30 PROCEDURE — A9503 TC99M MEDRONATE: HCPCS | Performed by: INTERNAL MEDICINE

## 2022-11-30 PROCEDURE — 82105 ALPHA-FETOPROTEIN SERUM: CPT

## 2022-11-30 PROCEDURE — 80321 ALCOHOLS BIOMARKERS 1OR 2: CPT

## 2022-11-30 PROCEDURE — 82248 BILIRUBIN DIRECT: CPT

## 2022-11-30 PROCEDURE — 78306 BONE IMAGING WHOLE BODY: CPT | Mod: 26 | Performed by: RADIOLOGY

## 2022-11-30 PROCEDURE — 78306 BONE IMAGING WHOLE BODY: CPT

## 2022-11-30 PROCEDURE — 85610 PROTHROMBIN TIME: CPT

## 2022-11-30 PROCEDURE — 36415 COLL VENOUS BLD VENIPUNCTURE: CPT

## 2022-11-30 PROCEDURE — 343N000001 HC RX 343: Performed by: INTERNAL MEDICINE

## 2022-11-30 RX ORDER — TC 99M MEDRONATE 20 MG/10ML
22.5-27.5 INJECTION, POWDER, LYOPHILIZED, FOR SOLUTION INTRAVENOUS ONCE
Status: COMPLETED | OUTPATIENT
Start: 2022-11-30 | End: 2022-11-30

## 2022-11-30 RX ADMIN — TC 99M MEDRONATE 24.05 MCI.: 20 INJECTION, POWDER, LYOPHILIZED, FOR SOLUTION INTRAVENOUS at 11:30

## 2022-12-05 LAB
PLPETH BLD-MCNC: <10 NG/ML
POPETH BLD-MCNC: <10 NG/ML

## 2022-12-06 ENCOUNTER — OFFICE VISIT (OUTPATIENT)
Dept: NEUROPSYCHOLOGY | Facility: CLINIC | Age: 67
End: 2022-12-06
Attending: INTERNAL MEDICINE
Payer: COMMERCIAL

## 2022-12-06 DIAGNOSIS — K76.82 HEPATIC ENCEPHALOPATHY (H): ICD-10-CM

## 2022-12-06 DIAGNOSIS — Z01.818 PREOP EXAMINATION: ICD-10-CM

## 2022-12-06 DIAGNOSIS — F09 COGNITIVE AND NEUROBEHAVIORAL DYSFUNCTION: Primary | ICD-10-CM

## 2022-12-06 DIAGNOSIS — K70.31 ALCOHOLIC CIRRHOSIS OF LIVER WITH ASCITES (H): ICD-10-CM

## 2022-12-06 PROCEDURE — 90791 PSYCH DIAGNOSTIC EVALUATION: CPT | Performed by: CLINICAL NEUROPSYCHOLOGIST

## 2022-12-06 PROCEDURE — 96138 PSYCL/NRPSYC TECH 1ST: CPT | Performed by: CLINICAL NEUROPSYCHOLOGIST

## 2022-12-06 PROCEDURE — 96139 PSYCL/NRPSYC TST TECH EA: CPT | Performed by: CLINICAL NEUROPSYCHOLOGIST

## 2022-12-06 PROCEDURE — 96132 NRPSYC TST EVAL PHYS/QHP 1ST: CPT | Performed by: CLINICAL NEUROPSYCHOLOGIST

## 2022-12-06 PROCEDURE — 96133 NRPSYC TST EVAL PHYS/QHP EA: CPT | Performed by: CLINICAL NEUROPSYCHOLOGIST
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
## 2022-12-07 NOTE — NURSING NOTE
Pt was seen for neuropsychological evaluation at the request of Juancho Willard MD for the purposes of diagnostic clarification and treatment planning. 240 minutes of test administration and scoring were provided by this writer. Please see Dr. Joseph Enamorado's report for a full interpretation of the findings.    Rita Villalpando  Psychometrist

## 2022-12-09 NOTE — PROGRESS NOTES
NAME  Cody Martinez    MRN  [id number]      [birth year]     AGE  67     SEX  Male     HANDEDNESS Right     EDUCATION 14     ALBERT  22     PROVIDER  PERNELL     TECH  SW     STATION  OP            ORIENTATION      Time  -6     Place      Personal Info.     Presidents                           WRAT   5     SS %ile GE   Word Reading 79 8 5.7          WASI-II       FSIQ: 79        Raw T    Vocabulary  27 38    Matrix Reasoning 10 37            BOSTON NAMING TEST     Raw 47 /60     SS 6      %ile 6-10              COWAT   CFL   Raw 15      SS 3      %ile 1             TRAIL MAKING TEST       Time Errors SSa %ile   A 99 1 2 <11   B  7            ILS HEALTH & SAFETY      Raw 29      SS 34              RBANS   A     Raw ss/%ile    List Learning 18 4    Story Memory 14 6    Figure Copy 9 1    Line Orientation 9 <2    Picture Naming 10 51-75    Semantic Fluency 5 1    Digit Span  8 7    Coding  9 1    List Recall  1 3-9    List Recognition 19 26-50    Story Recall 5 5    Figure Recall 5 4             Index     Immediate Mem. 73     Visuospat./Constr. 58     Language  74     Attention  60     Delayed Mem. 84     Total Scale 62            GDS (30-item)      Raw 11      Interp. Mild/Moderate            MERCADO TOKEN TEST      Raw 30      SS 3      %ile 1
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
## 2022-12-13 ENCOUNTER — TELEPHONE (OUTPATIENT)
Dept: TRANSPLANT | Facility: CLINIC | Age: 67
End: 2022-12-13

## 2022-12-13 DIAGNOSIS — K74.60 CIRRHOSIS OF LIVER (H): Primary | ICD-10-CM

## 2022-12-13 NOTE — TELEPHONE ENCOUNTER
Spoke with Emmie- patient's spouse.      Patient has been more lethargic than prior, but awakes easily and alert and oriented    Patient has not been complaint with lactulose.  He hit a point where he was having diarrhea and accidents, so instead of titrating down he just quit.  Emmie reports this happened 1-2 weeks ago.  No abdominal pain, no fevers, no bleeding, no other concerning sx other than generalized weakness and more lethargic    Approx 1-2 weeks ago, patient had been doing well, was oriented, walking around more and getting stronger until diarrhea started.    Long discussion on needing overall strength improved.  Also needs to see neurology given history of traumatic fall and started having falls at home again the last 2 weeks.  They have contact with [facility] and will get connected with them again, offered to assist prn.  ALso, will see with PCP on seeing neuro locally since booked out here.      Discussed compliance with lactulose critical, can't participate in care if lethargic and confused and will continue to decline physically.  This is essential.  Reviewed again titration of meds, keeping a log of doses of lactulose given vs stool vs HE sx.

## 2022-12-14 ENCOUNTER — TELEPHONE (OUTPATIENT)
Dept: TRANSPLANT | Facility: CLINIC | Age: 67
End: 2022-12-14

## 2023-01-03 ENCOUNTER — TELEPHONE (OUTPATIENT)
Dept: TRANSPLANT | Facility: CLINIC | Age: 68
End: 2023-01-03

## 2023-01-03 NOTE — TELEPHONE ENCOUNTER
Spoke with Emmie Ross doing overall better per her report.  He is mentally clear, compliant with lactulose, moving and being more active and eating well    They are considering move to [place], as daughters live there and can provide more assistance to support emmie in caring for Cody, as well as if she needs to leave to care for her elderly parents.      She is wondering if Dr. Willard and Ming have any recommendations for care providers in [place] if they make this move for IR and Hepatology follow up- there main concern with a move is thathe has care established here.  They are considering move near [facility] .  Message to these providers, they also have follow up with patient on 1/19 (Sudheer) and 1/25 (Ming)

## 2023-01-17 NOTE — PROGRESS NOTES
[facility]  Outpatient MNT: Transplant Waitlist    Current BMI: 25.4 (HT 71 in,  lbs/83 kg)  BMI is within recommendation of <45 for liver transplant    Frailty Assessment-- Frail (5/5 points)    FraiLT-- Frail     Recommended Interventions to Address Frailty:  - Physical therapy  - Include 1 protein supplement/day      Time Spent: 30 minutes  Visit Type: follow up (saw pt 11/2021 for pre txp eval)  Referring Physician: Sudheer  Pt accompanied by: his wife     Nutrition Assessment  H/o HCC, etoh, hep C, DM II  Pt reports he has a good appetite and is eating well. Wife reports she is not worrying as much about his BG in an effort to not overly restrict his diet. He is not currently consuming any protein drinks/bars, etc.   Last A1c 6.8 in Nov 2022.     Weight hx:   - Was down to 170 lbs, now back up (poor appetite, which has improved)  - No reported edema   Wt Readings from Last 10 Encounters:   01/19/23 82.8 kg (182 lb 9.6 oz)   11/03/22 79.8 kg (176 lb)   10/04/22 83 kg (183 lb)   09/27/22 80 kg (176 lb 5.9 oz)   09/07/22 80.7 kg (178 lb)   07/05/22 81.1 kg (178 lb 11.2 oz)   04/19/22 83 kg (183 lb)   01/21/22 87.5 kg (193 lb)   12/14/21 87.5 kg (193 lb)   12/13/21 87.5 kg (193 lb)     Diet Recall  Breakfast Cheerios; oatmeal; egg sandwich    Lunch Leftovers    Dinner Shrimp (4 oz) with rice and veggie; pork with white rice, veggies     Snacks Not as much lately- fruit snacks, fresh fruit, cookies, chips   Beverages Coffee, water, 7up (small can), juice (8 oz/day), milk (at least 8 oz/day)    Alcohol Not asked    Dining out Not much to count      Physical Activity  No activity lately; fell in front of the house (August)- has used a cane/walker since then   Pt reports doing PT last fall, but didn't continue with it  Feels like some things are an effort- getting dressed, etc  Has had a lot of falls at home     Nutrition Diagnosis  Predicted suboptimal nutrient intake (protein) r/t  increased needs with liver disease    Nutrition Intervention  Nutrition education provided:  Reviewed significant decline in functional status since I saw him last. We talked in depth about participating in PT. Reviewed in addition to protein at meals, would strongly recommend including a protein drink/bar at some point during the day. Also reviewed pairing carbohydrate with protein to avoid eating simple sugars w/o protein (ie fruit snacks, cookies, etc). Encouraged protein @ HS- such as toast w/ PB, yogurt with fruit, nuts/trail mix, etc.     Patient Understanding: Pt verbalized understanding of education provided.  Expected Engagement: Fair  Follow-Up Plans: PRN     Nutrition Goals  Include 1 protein drink or supplement/day     Cassandra Blackmon, RD, LD, CCTD

## 2023-01-19 ENCOUNTER — OFFICE VISIT (OUTPATIENT)
Dept: GASTROENTEROLOGY | Facility: CLINIC | Age: 68
End: 2023-01-19
Attending: INTERNAL MEDICINE
Payer: COMMERCIAL

## 2023-01-19 ENCOUNTER — ALLIED HEALTH/NURSE VISIT (OUTPATIENT)
Dept: TRANSPLANT | Facility: CLINIC | Age: 68
End: 2023-01-19
Attending: INTERNAL MEDICINE
Payer: COMMERCIAL

## 2023-01-19 ENCOUNTER — LAB (OUTPATIENT)
Dept: LAB | Facility: CLINIC | Age: 68
End: 2023-01-19
Payer: COMMERCIAL

## 2023-01-19 ENCOUNTER — ANCILLARY PROCEDURE (OUTPATIENT)
Dept: MRI IMAGING | Facility: CLINIC | Age: 68
End: 2023-01-19
Attending: INTERNAL MEDICINE
Payer: COMMERCIAL

## 2023-01-19 VITALS
WEIGHT: 182.6 LBS | OXYGEN SATURATION: 97 % | DIASTOLIC BLOOD PRESSURE: 74 MMHG | SYSTOLIC BLOOD PRESSURE: 115 MMHG | HEART RATE: 86 BPM | RESPIRATION RATE: 16 BRPM | HEIGHT: 71 IN | TEMPERATURE: 97.9 F | BODY MASS INDEX: 25.56 KG/M2

## 2023-01-19 DIAGNOSIS — K70.31 ALCOHOLIC CIRRHOSIS OF LIVER WITH ASCITES (H): ICD-10-CM

## 2023-01-19 DIAGNOSIS — E08.40 DIABETES MELLITUS DUE TO UNDERLYING CONDITION WITH DIABETIC NEUROPATHY, UNSPECIFIED (H): ICD-10-CM

## 2023-01-19 DIAGNOSIS — K74.60 CIRRHOSIS OF LIVER WITHOUT ASCITES, UNSPECIFIED HEPATIC CIRRHOSIS TYPE (H): ICD-10-CM

## 2023-01-19 DIAGNOSIS — C22.0 HEPATOCELLULAR CARCINOMA (H): ICD-10-CM

## 2023-01-19 DIAGNOSIS — K74.60 CIRRHOSIS OF LIVER (H): ICD-10-CM

## 2023-01-19 DIAGNOSIS — K70.31 ALCOHOLIC CIRRHOSIS OF LIVER WITH ASCITES (H): Primary | ICD-10-CM

## 2023-01-19 DIAGNOSIS — Z91.81 HISTORY OF RECENT FALL: ICD-10-CM

## 2023-01-19 DIAGNOSIS — C22.0 HCC (HEPATOCELLULAR CARCINOMA) (H): ICD-10-CM

## 2023-01-19 LAB
AFP SERPL-MCNC: 5.2 NG/ML
ALBUMIN SERPL BCG-MCNC: 3.5 G/DL (ref 3.5–5.2)
ALP SERPL-CCNC: 233 U/L (ref 40–129)
ALT SERPL W P-5'-P-CCNC: 20 U/L (ref 10–50)
ANION GAP SERPL CALCULATED.3IONS-SCNC: 9 MMOL/L (ref 7–15)
AST SERPL W P-5'-P-CCNC: 53 U/L (ref 10–50)
BILIRUB DIRECT SERPL-MCNC: 0.48 MG/DL (ref 0–0.3)
BILIRUB SERPL-MCNC: 1.5 MG/DL
BUN SERPL-MCNC: 12.8 MG/DL (ref 8–23)
CALCIUM SERPL-MCNC: 9.4 MG/DL (ref 8.8–10.2)
CHLORIDE SERPL-SCNC: 105 MMOL/L (ref 98–107)
CREAT SERPL-MCNC: 0.7 MG/DL (ref 0.67–1.17)
DEPRECATED HCO3 PLAS-SCNC: 25 MMOL/L (ref 22–29)
GFR SERPL CREATININE-BSD FRML MDRD: >90 ML/MIN/1.73M2
GLUCOSE SERPL-MCNC: 148 MG/DL (ref 70–99)
INR PPP: 1.18 (ref 0.85–1.15)
POTASSIUM SERPL-SCNC: 4.3 MMOL/L (ref 3.4–5.3)
PROT SERPL-MCNC: 5.8 G/DL (ref 6.4–8.3)
SODIUM SERPL-SCNC: 139 MMOL/L (ref 136–145)

## 2023-01-19 PROCEDURE — A9585 GADOBUTROL INJECTION: HCPCS | Performed by: RADIOLOGY

## 2023-01-19 PROCEDURE — 36415 COLL VENOUS BLD VENIPUNCTURE: CPT | Performed by: PATHOLOGY

## 2023-01-19 PROCEDURE — 82105 ALPHA-FETOPROTEIN SERUM: CPT | Performed by: INTERNAL MEDICINE

## 2023-01-19 PROCEDURE — 82248 BILIRUBIN DIRECT: CPT | Performed by: PATHOLOGY

## 2023-01-19 PROCEDURE — 74183 MRI ABD W/O CNTR FLWD CNTR: CPT | Performed by: STUDENT IN AN ORGANIZED HEALTH CARE EDUCATION/TRAINING PROGRAM

## 2023-01-19 PROCEDURE — G0463 HOSPITAL OUTPT CLINIC VISIT: HCPCS | Performed by: INTERNAL MEDICINE

## 2023-01-19 PROCEDURE — 70553 MRI BRAIN STEM W/O & W/DYE: CPT | Mod: GC | Performed by: RADIOLOGY

## 2023-01-19 PROCEDURE — 85610 PROTHROMBIN TIME: CPT | Performed by: PATHOLOGY

## 2023-01-19 PROCEDURE — 80053 COMPREHEN METABOLIC PANEL: CPT | Performed by: PATHOLOGY

## 2023-01-19 PROCEDURE — 99215 OFFICE O/P EST HI 40 MIN: CPT | Performed by: INTERNAL MEDICINE

## 2023-01-19 RX ORDER — GADOBUTROL 604.72 MG/ML
10 INJECTION INTRAVENOUS ONCE
Status: COMPLETED | OUTPATIENT
Start: 2023-01-19 | End: 2023-01-19

## 2023-01-19 RX ORDER — TIZANIDINE 2 MG/1
2 TABLET ORAL EVERY 8 HOURS PRN
COMMUNITY
Start: 2022-08-05

## 2023-01-19 RX ADMIN — GADOBUTROL 8 ML: 604.72 INJECTION INTRAVENOUS at 16:39

## 2023-01-19 ASSESSMENT — PAIN SCALES - GENERAL: PAINLEVEL: NO PAIN (0)

## 2023-01-19 NOTE — PROGRESS NOTES
HISTORY OF PRESENT ILLNESS:  I had the pleasure of seeing Mehdi Martinez for followup in the Liver Transplant Clinic at the [facility] on 01/19/2023.      Mr. 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  He is status post liver-directed therapy.  He has undergone evaluation for liver transplantation, but the current concerns are severe frailty as well as altered mental status that I do not think it is hepatic encephalopathy.    His wife does report that although his mental status does fluctuate, he really seems quite confused most of the time.  He is often not sure what day it is and she is quite concerned about his unsteadiness on his feet.  His general neurologic decline seems to have been consistent since his fall about 1 year ago.    He otherwise denies any abdominal pain, itching or skin rash.  He does have some fatigue.  He denies any increased abdominal girth or lower extremity edema.  He has not had any gastrointestinal bleeding.    He denies any fevers or chills, cough or shortness of breath.  He denies any nausea or vomiting and is having 2-3 bowel movements per day on his current dose of lactulose.  His appetite has been good and his weight for the most part has been stable.    Current Outpatient Medications   Medication     aMILoride (MIDAMOR) 5 MG tablet     Bioflavonoid Products (VITAMIN C) CHEW     fluticasone (FLONASE) 50 MCG/ACT nasal spray     folic acid (FOLVITE) 1 MG tablet     lactulose encephalopathy (CHRONULAC) 10 GM/15ML SOLUTION     lisinopril (ZESTRIL) 20 MG tablet     metFORMIN (GLUCOPHAGE) 1000 MG tablet     methotrexate 2.5 MG tablet     pantoprazole (PROTONIX) 40 MG EC tablet     rifaximin (XIFAXAN) 550 MG TABS tablet     tiZANidine (ZANAFLEX) 2 MG tablet     zinc sulfate (ZINCATE) 220 MG capsule     cetirizine (ZYRTEC) 10 MG tablet     No current facility-administered medications for  "this visit.     /74 (BP Location: Right arm, Patient Position: Sitting, Cuff Size: Adult Regular)   Pulse 86   Temp 97.9  F (36.6  C) (Oral)   Resp 16   Ht 1.803 m (5' 10.98\")   Wt 82.8 kg (182 lb 9.6 oz)   SpO2 97%   BMI 25.48 kg/m      PHYSICAL EXAMINATION:    GENERAL:  He looks chronically ill.  He remains somewhat hunched over and his affect is quite dull.    HEENT:  Shows no scleral icterus or temporal muscle wasting.  CHEST:  Clear.  ABDOMEN:  No increase in girth.  No masses or tenderness to palpation are present.  His liver is 10 cm in span without left lobe enlargement.  No spleen tip is palpable.  EXTREMITIES:  No edema.  SKIN:  No stigmata of chronic liver disease.  NEUROLOGIC:  Shows no asterixis.  He is only oriented x2 and really does not seem to be like at baseline before his falls and I have known him for quite some time.    Recent Results (from the past 168 hour(s))   Basic metabolic panel    Collection Time: 01/19/23 11:03 AM   Result Value Ref Range    Sodium 139 136 - 145 mmol/L    Potassium 4.3 3.4 - 5.3 mmol/L    Chloride 105 98 - 107 mmol/L    Carbon Dioxide (CO2) 25 22 - 29 mmol/L    Anion Gap 9 7 - 15 mmol/L    Urea Nitrogen 12.8 8.0 - 23.0 mg/dL    Creatinine 0.70 0.67 - 1.17 mg/dL    Calcium 9.4 8.8 - 10.2 mg/dL    Glucose 148 (H) 70 - 99 mg/dL    GFR Estimate >90 >60 mL/min/1.73m2   Hepatic panel    Collection Time: 01/19/23 11:03 AM   Result Value Ref Range    Protein Total 5.8 (L) 6.4 - 8.3 g/dL    Albumin 3.5 3.5 - 5.2 g/dL    Bilirubin Total 1.5 (H) <=1.2 mg/dL    Alkaline Phosphatase 233 (H) 40 - 129 U/L    AST 53 (H) 10 - 50 U/L    ALT 20 10 - 50 U/L    Bilirubin Direct 0.48 (H) 0.00 - 0.30 mg/dL   INR    Collection Time: 01/19/23 11:03 AM   Result Value Ref Range    INR 1.18 (H) 0.85 - 1.15      MELD-Na score: 10 at 1/19/2023 11:03 AM  MELD score: 10 at 1/19/2023 11:03 AM  Calculated from:  Serum Creatinine: 0.70 mg/dL (Using min of 1 mg/dL) at 1/19/2023 11:03 " AM  Serum Sodium: 139 mmol/L (Using max of 137 mmol/L) at 1/19/2023 11:03 AM  Total Bilirubin: 1.5 mg/dL at 1/19/2023 11:03 AM  INR(ratio): 1.18 at 1/19/2023 11:03 AM  Age: 67 years    IMPRESSION:  Mr. 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  His MELD score is quite low and his tumor is under good control.  He is due for another MRI in about 3 months.    The major concern has been about his altered mental status.  I strongly believe that it is not hepatic encephalopathy.  He has no asterixis today and while there is some fluctuation, he has not been hospitalized for encephalopathy since he was last seen here.      He is getting an MRI of his brain today has an appointment with a neurologist in 1 month.  I certainly will be interested to hear what their opinions are.  His neuropsych evaluation showed a very poor functional state.  It is the opinion of the neuropsych provider that he will require a great deal of care post transplant, which is certainly not the outcome they were looking for him for liver transplantation.    He is otherwise up to date with regard to vaccines and other cancer screening and I will see him back in the clinic in 6 weeks.    I did spend total of 40 minutes (on the date of the encounter), including 30 minutes of face-to-face clinic time including counseling.  The rest of the time was spent in documentation and review of records.     Thank you very much for allowing me to participate in the care of this patient.  If you have any questions regarding recommendations, please do not hesitate to contact me.         Juancho Willard MD      Professor of Medicine  [employer]      Executive Medical Director, Solid Organ Transplant Program  [facility]

## 2023-01-19 NOTE — NURSING NOTE
"Chief Complaint   Patient presents with     RECHECK     Cirrhosis     Vital signs:  Temp: 97.9  F (36.6  C) Temp src: Oral BP: 115/74 Pulse: 86   Resp: 16 SpO2: 97 %     Height: 180.3 cm (5' 10.98\") Weight: 82.8 kg (182 lb 9.6 oz)  Estimated body mass index is 25.48 kg/m  as calculated from the following:    Height as of this encounter: 1.803 m (5' 10.98\").    Weight as of this encounter: 82.8 kg (182 lb 9.6 oz).        Patrica Villegas, [facility]  1/19/2023 11:19 AM      "

## 2023-01-19 NOTE — LETTER
1/19/2023         RE: Mehdi Martinez  [address]  [place] [address]        Dear Colleague,    Thank you for referring your patient, Mehdi Martinez, to the [facility] [place]. Please see a copy of my visit note below.    HISTORY OF PRESENT ILLNESS:  I had the pleasure of seeing Mehdi Martinez for followup in the Liver Transplant Clinic at the [facility] on 01/19/2023.      Mr. 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  He is status post liver-directed therapy.  He has undergone evaluation for liver transplantation, but the current concerns are severe frailty as well as altered mental status that I do not think it is hepatic encephalopathy.    His wife does report that although his mental status does fluctuate, he really seems quite confused most of the time.  He is often not sure what day it is and she is quite concerned about his unsteadiness on his feet.  His general neurologic decline seems to have been consistent since his fall about 1 year ago.    He otherwise denies any abdominal pain, itching or skin rash.  He does have some fatigue.  He denies any increased abdominal girth or lower extremity edema.  He has not had any gastrointestinal bleeding.    He denies any fevers or chills, cough or shortness of breath.  He denies any nausea or vomiting and is having 2-3 bowel movements per day on his current dose of lactulose.  His appetite has been good and his weight for the most part has been stable.    Current Outpatient Medications   Medication     aMILoride (MIDAMOR) 5 MG tablet     Bioflavonoid Products (VITAMIN C) CHEW     fluticasone (FLONASE) 50 MCG/ACT nasal spray     folic acid (FOLVITE) 1 MG tablet     lactulose encephalopathy (CHRONULAC) 10 GM/15ML SOLUTION     lisinopril (ZESTRIL) 20 MG tablet     metFORMIN (GLUCOPHAGE) 1000 MG tablet     methotrexate 2.5 MG  "tablet     pantoprazole (PROTONIX) 40 MG EC tablet     rifaximin (XIFAXAN) 550 MG TABS tablet     tiZANidine (ZANAFLEX) 2 MG tablet     zinc sulfate (ZINCATE) 220 MG capsule     cetirizine (ZYRTEC) 10 MG tablet     No current facility-administered medications for this visit.     /74 (BP Location: Right arm, Patient Position: Sitting, Cuff Size: Adult Regular)   Pulse 86   Temp 97.9  F (36.6  C) (Oral)   Resp 16   Ht 1.803 m (5' 10.98\")   Wt 82.8 kg (182 lb 9.6 oz)   SpO2 97%   BMI 25.48 kg/m      PHYSICAL EXAMINATION:    GENERAL:  He looks chronically ill.  He remains somewhat hunched over and his affect is quite dull.    HEENT:  Shows no scleral icterus or temporal muscle wasting.  CHEST:  Clear.  ABDOMEN:  No increase in girth.  No masses or tenderness to palpation are present.  His liver is 10 cm in span without left lobe enlargement.  No spleen tip is palpable.  EXTREMITIES:  No edema.  SKIN:  No stigmata of chronic liver disease.  NEUROLOGIC:  Shows no asterixis.  He is only oriented x2 and really does not seem to be like at baseline before his falls and I have known him for quite some time.    Recent Results (from the past 168 hour(s))   Basic metabolic panel    Collection Time: 01/19/23 11:03 AM   Result Value Ref Range    Sodium 139 136 - 145 mmol/L    Potassium 4.3 3.4 - 5.3 mmol/L    Chloride 105 98 - 107 mmol/L    Carbon Dioxide (CO2) 25 22 - 29 mmol/L    Anion Gap 9 7 - 15 mmol/L    Urea Nitrogen 12.8 8.0 - 23.0 mg/dL    Creatinine 0.70 0.67 - 1.17 mg/dL    Calcium 9.4 8.8 - 10.2 mg/dL    Glucose 148 (H) 70 - 99 mg/dL    GFR Estimate >90 >60 mL/min/1.73m2   Hepatic panel    Collection Time: 01/19/23 11:03 AM   Result Value Ref Range    Protein Total 5.8 (L) 6.4 - 8.3 g/dL    Albumin 3.5 3.5 - 5.2 g/dL    Bilirubin Total 1.5 (H) <=1.2 mg/dL    Alkaline Phosphatase 233 (H) 40 - 129 U/L    AST 53 (H) 10 - 50 U/L    ALT 20 10 - 50 U/L    Bilirubin Direct 0.48 (H) 0.00 - 0.30 mg/dL   INR    " Collection Time: 01/19/23 11:03 AM   Result Value Ref Range    INR 1.18 (H) 0.85 - 1.15      MELD-Na score: 10 at 1/19/2023 11:03 AM  MELD score: 10 at 1/19/2023 11:03 AM  Calculated from:  Serum Creatinine: 0.70 mg/dL (Using min of 1 mg/dL) at 1/19/2023 11:03 AM  Serum Sodium: 139 mmol/L (Using max of 137 mmol/L) at 1/19/2023 11:03 AM  Total Bilirubin: 1.5 mg/dL at 1/19/2023 11:03 AM  INR(ratio): 1.18 at 1/19/2023 11:03 AM  Age: 67 years    IMPRESSION:  Mr. 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  His MELD score is quite low and his tumor is under good control.  He is due for another MRI in about 3 months.    The major concern has been about his altered mental status.  I strongly believe that it is not hepatic encephalopathy.  He has no asterixis today and while there is some fluctuation, he has not been hospitalized for encephalopathy since he was last seen here.      He is getting an MRI of his brain today has an appointment with a neurologist in 1 month.  I certainly will be interested to hear what their opinions are.  His neuropsych evaluation showed a very poor functional state.  It is the opinion of the neuropsych provider that he will require a great deal of care post transplant, which is certainly not the outcome they were looking for him for liver transplantation.    He is otherwise up to date with regard to vaccines and other cancer screening and I will see him back in the clinic in 6 weeks.    I did spend total of 40 minutes (on the date of the encounter), including 30 minutes of face-to-face clinic time including counseling.  The rest of the time was spent in documentation and review of records.     Thank you very much for allowing me to participate in the care of this patient.  If you have any questions regarding recommendations, please do not hesitate to contact me.         Juancho Willard MD      Professor of Medicine  [employer]      Executive Medical Director,  Solid Organ Transplant Program  [facility]

## 2023-01-20 ENCOUNTER — TELEPHONE (OUTPATIENT)
Dept: GASTROENTEROLOGY | Facility: CLINIC | Age: 68
End: 2023-01-20
Payer: COMMERCIAL

## 2023-01-24 ENCOUNTER — TELEPHONE (OUTPATIENT)
Dept: GASTROENTEROLOGY | Facility: CLINIC | Age: 68
End: 2023-01-24
Payer: COMMERCIAL

## 2023-01-24 NOTE — TELEPHONE ENCOUNTER
Central Prior Authorization Team   Phone: [phone]      PA Initiation    Medication: rifaximin (XIFAXAN) 550 MG TABS tablet  Insurance Company: [employer] Part D - Phone [phone] Fax [phone]  Pharmacy Filling the Rx: [facility] - [place], [place] - [address] [place] [address]  Filling Pharmacy Phone: [phone]  Filling Pharmacy Fax: [phone]  Start Date: 1/24/2023

## 2023-01-25 ENCOUNTER — VIRTUAL VISIT (OUTPATIENT)
Dept: VASCULAR SURGERY | Facility: CLINIC | Age: 68
End: 2023-01-25
Payer: COMMERCIAL

## 2023-01-25 DIAGNOSIS — Z95.828 S/P TIPS (TRANSJUGULAR INTRAHEPATIC PORTOSYSTEMIC SHUNT): Primary | ICD-10-CM

## 2023-01-25 DIAGNOSIS — C22.0 HCC (HEPATOCELLULAR CARCINOMA) (H): ICD-10-CM

## 2023-01-25 DIAGNOSIS — K74.60 CIRRHOSIS OF LIVER WITHOUT ASCITES, UNSPECIFIED HEPATIC CIRRHOSIS TYPE (H): ICD-10-CM

## 2023-01-25 PROCEDURE — 99213 OFFICE O/P EST LOW 20 MIN: CPT | Mod: GT | Performed by: RADIOLOGY

## 2023-01-25 NOTE — TELEPHONE ENCOUNTER
Central Prior Authorization Team   Phone: [phone]      Prior Authorization Not Needed per Insurance    Medication: rifaximin (XIFAXAN) 550 MG TABS tablet - [place] NOT NEEDED  Insurance Company: [employer] Part D - Phone [phone] Fax [phone]  Expected CoPay:      Pharmacy Filling the Rx: [facility] - [address]  Pharmacy Notified: No  Patient Notified: No

## 2023-01-25 NOTE — PROGRESS NOTES
[facility]    Progress Summary:  Mr. Mehdi Martinez is a 67-year-old male who presents for his 3-month follow-up visit post microwave ablation of a segment 6 LIRADS 4 lesion likely secondary to HCC. 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 Patient is accompanied by his spouse. He is  doing well overall  and does not report of any specific complaints or concerns for post-procedure complications. His spouse states he continues to struggle with mild confusion, coordination, and balance. She notes his neurology team has worked him up and are favoring dementia vs. low-grade chronic hepatic encephalopathy related to his TIPS. Of note, the patient is planning on moving to [place] to be closer to his daughter. There are no additional medical concerns or questions at this time.     ROS:  Negative unless otherwise stated in progress summary above.    Physical exam:  Physical exam is deferred to the video virtual visit.  He is no acute distress, slow mentation, slow speech, normocephalic atraumatic.    Labs:  Lab on 01/19/2023   Component Date Value Ref Range Status     Sodium 01/19/2023 139  136 - 145 mmol/L Final     Potassium 01/19/2023 4.3  3.4 - 5.3 mmol/L Final     Chloride 01/19/2023 105  98 - 107 mmol/L Final     Carbon Dioxide (CO2) 01/19/2023 25  22 - 29 mmol/L Final     Anion Gap 01/19/2023 9  7 - 15 mmol/L Final     Urea Nitrogen 01/19/2023 12.8  8.0 - 23.0 mg/dL Final     Creatinine 01/19/2023 0.70  0.67 - 1.17 mg/dL Final     Calcium 01/19/2023 9.4  8.8 - 10.2 mg/dL Final      Glucose 01/19/2023 148 (H)  70 - 99 mg/dL Final     GFR Estimate 01/19/2023 >90  >60 mL/min/1.73m2 Final    Effective December 21, 2021 eGFRcr in adults is calculated using the 2021 CKD-EPI creatinine equation which includes age and gender (Silver et al., NEJ, DOI: 10.1056/NLHIfz0643478)     Protein Total 01/19/2023 5.8 (L)  6.4 - 8.3 g/dL Final     Albumin 01/19/2023 3.5  3.5 - 5.2 g/dL Final     Bilirubin Total 01/19/2023 1.5 (H)  <=1.2 mg/dL Final     Alkaline Phosphatase 01/19/2023 233 (H)  40 - 129 U/L Final     AST 01/19/2023 53 (H)  10 - 50 U/L Final     ALT 01/19/2023 20  10 - 50 U/L Final     Bilirubin Direct 01/19/2023 0.48 (H)  0.00 - 0.30 mg/dL Final     AFP tumor marker 01/19/2023 5.2  <=8.3 ng/mL Final     INR 01/19/2023 1.18 (H)  0.85 - 1.15 Final       Diagnostic Imaging:  Personally reviewed and interpreted the following studies:    MR BRAIN W/O & W CONTRAST    1/19/2023   IMPRESSION:  1. No evidence for intracranial metastatic disease.  2. No acute or suspicious intracranial findings.    MR ABDOMEN  1/19/2023  IMPRESSION:    1. Cirrhotic hepatic morphology with evidence of portal hypertension. TIPS in place. No ascites.  2. Postablation changes involving segments 6 with lateral rim like arterial enhancement along treatment zone, less conspicuous on today's exam and favored to be perfusion related/transient hepatic intensity difference. LR-TR, equivocal. Continued short term follow up MRI with [facility] recommended.  3. Radioembolization changes in hepatic segment 4 without evidence of residual or recurrent tumor. LIRADS TR-nonviable  4. Based on this exam only, the patient is within [place] criteria.  5. Stable subcentimeter cystic focus in the pancreatic body, likely a side branch intraductal papillary mucinous neoplasm. Recommend continued attention on follow-up.    Assessment and Plan:  Mr. Mehdi Martinez is a 67-year-old male who presents for his 3-month follow-up visit post microwave ablation of  a segment 6 LIRADS 4 lesion likely secondary to HCC. Patient is progressing well post-procedure. Discussion was had regarding patient s recent imaging and lab results. Recent imaging from 1/19/23 also shows encouraging progress, with no evidence of new or progressing lesions noticeable on imaging. Lab results from 1/19/23 are also grossly normal given patient s medical history. Agreeable with patient's neurology team that his ongoing symptoms of mild confusion, coordination, and balance favor dementia vs. low-grade chronic hepatic encephalopathy related to TIPS.     Patient should plan to continue follow up care with his hepatology and oncology teams regarding his additional care an liver transplant evaluation status, and should appreciate their recommendations. Patient advised to follow with another transplant center in [place] nearby his future place of residency. Plan for patient to follow up with IR in 3 months with additional repeat imaging.    Tima Jarvis  Medical Student, MS3  [employer]    A total of 15 minutes was spent on this virtual clinic visit, with more than half was on direct counseling and coordination of the care.    I interviewed the patient with the medical student and agree with the assessment and plan.    [facility]      Type of Visit: Virtual: Clarity   Video Start: 1:40 PM  Video End: 1:47 PM    Patient is here for a return visit to discuss s/p liver directed thereapy.     Vitals - Patient Reported  Weight (Patient Reported): 81.6 kg (180 lb)  Height (Patient Reported): 182.9 cm (6')  BMI (Based on Pt Reported Ht/Wt): 24.41  Pain Score: No Pain (0)    Patient states is currently in the [facility]: No    Questions patient would like addressed today are: Patient verbalized no questions/concerns, this has been communicated to the provider.     Refills are needed: no    How would you like to obtain your AVS?  Mikal Stoll

## 2023-01-26 RX ORDER — AMILORIDE HYDROCHLORIDE 5 MG/1
TABLET ORAL
Qty: 180 TABLET | Refills: 3 | Status: SHIPPED | OUTPATIENT
Start: 2023-01-26

## 2023-02-10 ENCOUNTER — DOCUMENTATION ONLY (OUTPATIENT)
Dept: TRANSPLANT | Facility: CLINIC | Age: 68
End: 2023-02-10
Payer: COMMERCIAL

## 2023-02-10 NOTE — PROGRESS NOTES
Patient discussed at the multidisciplinary tumor board on 2/10/23. The attendees may consist of representatives from hepatology, oncology, radiation oncology, surgical subspecialty including liver transplant and radiology.    Surveillance MRI dated 1.19.23 reviewed at conference.    No evidence of HCC on most recent MRI.    Continue with 3 month surveillance.

## 2023-02-23 NOTE — LETTER
1/25/2023       RE: Mehdi Martinez  [address] [facility] [place] [address]     Dear Colleague,    Thank you for referring your patient, Mehdi Martinez, to the [facility] at [facility]. Please see a copy of my visit note below.    [facility]    Progress Summary:  Mr. Mehdi Martinez is a 67-year-old male who presents for his 3-month follow-up visit post microwave ablation of a segment 6 LIRADS 4 lesion likely secondary to HCC. 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 Patient is accompanied by his spouse. He is  doing well overall  and does not report of any specific complaints or concerns for post-procedure complications. His spouse states he continues to struggle with mild confusion, coordination, and balance. She notes his neurology team has worked him up and are favoring dementia vs. low-grade chronic hepatic encephalopathy related to his TIPS. Of note, the patient is planning on moving to [place] to be closer to his daughter. There are no additional medical concerns or questions at this time.     ROS:  Negative unless otherwise stated in progress summary above.    Physical exam:  Physical exam is deferred to the video virtual visit.  He is no acute distress, slow mentation, slow speech, normocephalic atraumatic.    Labs:  Lab on 01/19/2023   Component Date Value Ref Range Status     Sodium 01/19/2023 139  136 - 145 mmol/L Final     Potassium 01/19/2023 4.3  3.4 - 5.3 mmol/L Final     Chloride  Romero Blackmon DO                Diplomate of the [employer] of [facility] [place]         [phone]    Nohemy Calderon is a 44 y.o. female who was seen on 2/24/2023 for   Chief Complaint   Patient presents with    Gynecologic Exam         Assessment & Plan     Diagnosis Orders   1. Well woman exam with routine gynecological exam  PAP IG, Aptima HPV and rfx 16/18,45 ([id number])    PAP IG, Aptima HPV and rfx 16/18,45 ([id number])      2. Screening for cervical cancer  PAP IG, Aptima HPV and rfx 16/18,45 ([id number])    PAP IG, Aptima HPV and rfx 16/18,45 ([id number])      3. Type 2 diabetes mellitus without complication, without long-term current use of insulin (HCC)  Microalbumin / Creatinine Urine Ratio    TSH    metFORMIN (GLUCOPHAGE-XR) 500 MG extended release tablet    Hemoglobin A1C    TSH    Microalbumin / Creatinine Urine Ratio    Discussed type 2 diabetes goals at length today. Start metformin extended release 500 mg daily. Check fasting blood sugars daily. 4. Benign essential HTN      well controlled      5. Dyslipidemia  rosuvastatin (CRESTOR) 10 MG tablet    Lipid Panel    Hepatic Function Panel    Start Crestor 10 mg daily. Recheck lipids and liver function in 3 months      6. Class 3 severe obesity due to excess calories with serious comorbidity and body mass index (BMI) of 40.0 to 44.9 in adult ([address]        7. Screening for thyroid disorder  TSH    TSH    Check TSH today      8. High risk medication use  Hepatic Function Panel          Follow-up and Dispositions    Return in about 3 months (around 5/24/2023) for FASTING JASPREET (LIPID, A1c, LIVER), LABS TODAY. RECENT LABS/TESTS TO REVIEW and DISCUSS    No results found for this visit on 02/24/23.     PHQ-9  2/22/2023 10/18/2022 6/24/2022   Little interest or pleasure in doing things 1 0 0   Little interest or pleasure in doing things - - -   Feeling down, depressed, 01/19/2023 105  98 - 107 mmol/L Final     Carbon Dioxide (CO2) 01/19/2023 25  22 - 29 mmol/L Final     Anion Gap 01/19/2023 9  7 - 15 mmol/L Final     Urea Nitrogen 01/19/2023 12.8  8.0 - 23.0 mg/dL Final     Creatinine 01/19/2023 0.70  0.67 - 1.17 mg/dL Final     Calcium 01/19/2023 9.4  8.8 - 10.2 mg/dL Final     Glucose 01/19/2023 148 (H)  70 - 99 mg/dL Final     GFR Estimate 01/19/2023 >90  >60 mL/min/1.73m2 Final    Effective December 21, 2021 eGFRcr in adults is calculated using the 2021 CKD-EPI creatinine equation which includes age and gender (Silver et al., NEJ, DOI: 10.1056/MRHSwp3963687)     Protein Total 01/19/2023 5.8 (L)  6.4 - 8.3 g/dL Final     Albumin 01/19/2023 3.5  3.5 - 5.2 g/dL Final     Bilirubin Total 01/19/2023 1.5 (H)  <=1.2 mg/dL Final     Alkaline Phosphatase 01/19/2023 233 (H)  40 - 129 U/L Final     AST 01/19/2023 53 (H)  10 - 50 U/L Final     ALT 01/19/2023 20  10 - 50 U/L Final     Bilirubin Direct 01/19/2023 0.48 (H)  0.00 - 0.30 mg/dL Final     AFP tumor marker 01/19/2023 5.2  <=8.3 ng/mL Final     INR 01/19/2023 1.18 (H)  0.85 - 1.15 Final       Diagnostic Imaging:  Personally reviewed and interpreted the following studies:    MR BRAIN W/O & W CONTRAST    1/19/2023   IMPRESSION:  1. No evidence for intracranial metastatic disease.  2. No acute or suspicious intracranial findings.    MR ABDOMEN  1/19/2023  IMPRESSION:    1. Cirrhotic hepatic morphology with evidence of portal hypertension. TIPS in place. No ascites.  2. Postablation changes involving segments 6 with lateral rim like arterial enhancement along treatment zone, less conspicuous on today's exam and favored to be perfusion related/transient hepatic intensity difference. LR-TR, equivocal. Continued short term follow up MRI with Gadavist recommended.  3. Radioembolization changes in hepatic segment 4 without evidence of residual or recurrent tumor. LIRADS TR-nonviable  4. Based on this exam only, the patient is within [place]  or hopeless 1 0 0   Trouble falling or staying asleep, or sleeping too much - - -   Feeling tired or having little energy - - -   Poor appetite or overeating - - -   Feeling bad about yourself - or that you are a failure or have let yourself or your family down - - -   Trouble concentrating on things, such as reading the newspaper or watching television - - -   Moving or speaking so slowly that other people could have noticed. Or the opposite - being so fidgety or restless that you have been moving around a lot more than usual - - -   PHQ-2 Score 2 0 0   Total Score PHQ 2 - - -   PHQ-9 Total Score 2 0 0   How difficult have these problems made it for you to do your work, take care of your home and get along with others - - -     Component       Hemoglobin A1C   Latest Ref Rng & Units       4.8 - 5.6 %   2/22/2023      12:02 PM 6.5 (H)   9/23/2022      9:39 AM 6.4 (H)   10/29/2021      9:30 AM 5.4   7/30/2020      9:36 AM 5.7 (H)   1/20/2020      1:56 PM 6.1 (H)     Subjective    HPI:     This is a 52-year-old female patient here today for a well woman exam and Pap test.  The patient had labs in advance of today's visit, the results of which were discussed with him/her at length. Recent labs indicate the patient has become a diabetic with a hemoglobin A1c of 6.5. Discussed DM II goals at length:    HgbA1c every 3/6 months (HgbA1c goal less than 6.5). Check fasting blood sugar (glucometer) daily. Statin medication (high dose) for all diabetics (LDL goal less than 70). ACE/ARB for all diabetics for renal protection (Goal BP<130/80)  All immunizations up to date, especially COVID vaccine series, annual flu shot and TDAP (every 10 years). Annual diabetic eye exam to screen for retinopathy. Annual screening for microalbuminuria. Annual diabetic foot exam and daily observance of feet for injury/ulceration, non-healing wounds. Low carb diet for weight loss to achieve a BMI of less than 25.     We will start criteria.  5. Stable subcentimeter cystic focus in the pancreatic body, likely a side branch intraductal papillary mucinous neoplasm. Recommend continued attention on follow-up.    Assessment and Plan:  Mr. Mehdi Matrinez is a 67-year-old male who presents for his 3-month follow-up visit post microwave ablation of a segment 6 LIRADS 4 lesion likely secondary to HCC. Patient is progressing well post-procedure. Discussion was had regarding patient s recent imaging and lab results. Recent imaging from 1/19/23 also shows encouraging progress, with no evidence of new or progressing lesions noticeable on imaging. Lab results from 1/19/23 are also grossly normal given patient s medical history. Agreeable with patient's neurology team that his ongoing symptoms of mild confusion, coordination, and balance favor dementia vs. low-grade chronic hepatic encephalopathy related to TIPS.     Patient should plan to continue follow up care with his hepatology and oncology teams regarding his additional care an liver transplant evaluation status, and should appreciate their recommendations. Patient advised to follow with another transplant center in [place] nearby his future place of residency. Plan for patient to follow up with IR in 3 months with additional repeat imaging.    Tima Jarvis  Medical Student, MS3  [employer]    A total of 15 minutes was spent on this virtual clinic visit, with more than half was on direct counseling and coordination of the care.    I interviewed the patient with the medical student and agree with the assessment and plan.    Rohan MARTINEZ [facility]      Type of Visit: Virtual: AmWell   Video Start: 1:40 PM  Video End: 1:47 PM    Patient is here for a return visit to discuss s/p liver directed thereapy.     Vitals - Patient Reported  Weight (Patient Reported): 81.6 kg (180 lb)  Height (Patient Reported): 182.9 cm (6')  BMI (Based on Pt Reported  statin medication at this time because of guidelines and her dyslipidemia. Additional lab studies obtained. Reviewed and updated this visit by provider:  Problems           Review of Systems   Constitutional:  Negative for fever and unexpected weight change. HENT:  Negative for congestion, ear pain and sore throat. Eyes:  Negative for pain. Respiratory:  Negative for cough, shortness of breath and wheezing. Cardiovascular:  Negative for chest pain, palpitations and leg swelling. Gastrointestinal:  Negative for abdominal pain, constipation, diarrhea and vomiting. Genitourinary:  Negative for difficulty urinating, dysuria and frequency. Musculoskeletal:  Negative for arthralgias, back pain and myalgias. Skin:  Negative for rash. Neurological:  Negative for dizziness, weakness and headaches. Psychiatric/Behavioral:  Negative for confusion and dysphoric mood. The patient is not nervous/anxious. All other systems reviewed and are negative. Objective    /70 (Site: Left Upper Arm, Position: Sitting, Cuff Size: Large Adult)   Pulse 99   Ht 5' 9\" (1.753 m)   Wt 296 lb (134.3 kg)   LMP 01/31/2023 (Approximate)   SpO2 98%   BMI 43.71 kg/m²     Physical Exam  Vitals reviewed. Exam conducted with a chaperone present Alexander Hunter). Constitutional:       General: She is not in acute distress. Appearance: Normal appearance. HENT:      Head: Normocephalic and atraumatic. Right Ear: Tympanic membrane normal.      Left Ear: Tympanic membrane normal.      Mouth/Throat:      Mouth: Mucous membranes are moist.   Eyes:      Extraocular Movements: Extraocular movements intact. Conjunctiva/sclera: Conjunctivae normal.      Pupils: Pupils are equal, round, and reactive to light. Neck:      Vascular: No carotid bruit. Cardiovascular:      Rate and Rhythm: Normal rate and regular rhythm. Heart sounds: Normal heart sounds.    Pulmonary:      Effort: Pulmonary effort is Ht/Wt): 24.41  Pain Score: No Pain (0)    Patient states is currently in the [facility]: No    Questions patient would like addressed today are: Patient verbalized no questions/concerns, this has been communicated to the provider.     Refills are needed: no    How would you like to obtain your AVS? Mikal Araujo Colling        Sincerely,    Rohan Lai MD   normal.      Breath sounds: Normal breath sounds. Abdominal:      Palpations: Abdomen is soft. There is no mass. Tenderness: There is no abdominal tenderness. Hernia: There is no hernia in the left inguinal area or right inguinal area. Genitourinary:     General: Normal vulva. Exam position: Lithotomy position. Pubic Area: No rash. Labia:         Right: No rash, tenderness or lesion. Left: No rash, tenderness or lesion. Vagina: Normal.      Cervix: Normal.      Uterus: Normal.       Adnexa: Right adnexa normal and left adnexa normal.      Rectum: Normal.   Musculoskeletal:      Cervical back: No tenderness. Lymphadenopathy:      Cervical: No cervical adenopathy. Lower Body: No right inguinal adenopathy. No left inguinal adenopathy. Neurological:      General: No focal deficit present. Mental Status: She is alert and oriented to person, place, and time. Psychiatric:         Mood and Affect: Mood normal.         Behavior: Behavior normal.         Thought Content: Thought content normal.         Judgment: Judgment normal.       On this date 02/24/2023 I have spent 45 minutes reviewing previous notes, lab/imaging results and face to face with the patient discussing the diagnoses and importance of compliance with the treatment plan, as well as documenting on the day of the visit.         DO David Albrecht [facility] [place]

## 2023-03-10 ENCOUNTER — OFFICE VISIT (OUTPATIENT)
Dept: GASTROENTEROLOGY | Facility: CLINIC | Age: 68
End: 2023-03-10
Attending: INTERNAL MEDICINE
Payer: COMMERCIAL

## 2023-03-10 ENCOUNTER — LAB (OUTPATIENT)
Dept: LAB | Facility: CLINIC | Age: 68
End: 2023-03-10
Attending: INTERNAL MEDICINE
Payer: COMMERCIAL

## 2023-03-10 VITALS
WEIGHT: 193 LBS | DIASTOLIC BLOOD PRESSURE: 67 MMHG | BODY MASS INDEX: 26.93 KG/M2 | SYSTOLIC BLOOD PRESSURE: 137 MMHG | OXYGEN SATURATION: 97 % | HEART RATE: 71 BPM

## 2023-03-10 DIAGNOSIS — C22.0 HCC (HEPATOCELLULAR CARCINOMA) (H): ICD-10-CM

## 2023-03-10 DIAGNOSIS — C22.0 HEPATOCELLULAR CARCINOMA (H): ICD-10-CM

## 2023-03-10 DIAGNOSIS — K70.30 ALCOHOLIC CIRRHOSIS (H): ICD-10-CM

## 2023-03-10 DIAGNOSIS — K74.60 CIRRHOSIS OF LIVER WITHOUT ASCITES, UNSPECIFIED HEPATIC CIRRHOSIS TYPE (H): Primary | ICD-10-CM

## 2023-03-10 LAB
AFP SERPL-MCNC: 4 NG/ML
ALBUMIN SERPL BCG-MCNC: 3.4 G/DL (ref 3.5–5.2)
ALP SERPL-CCNC: 183 U/L (ref 40–129)
ALT SERPL W P-5'-P-CCNC: 26 U/L (ref 10–50)
ANION GAP SERPL CALCULATED.3IONS-SCNC: 9 MMOL/L (ref 7–15)
AST SERPL W P-5'-P-CCNC: 70 U/L (ref 10–50)
BILIRUB DIRECT SERPL-MCNC: 0.59 MG/DL (ref 0–0.3)
BILIRUB SERPL-MCNC: 1.8 MG/DL
BUN SERPL-MCNC: 11.6 MG/DL (ref 8–23)
CALCIUM SERPL-MCNC: 9.2 MG/DL (ref 8.8–10.2)
CHLORIDE SERPL-SCNC: 104 MMOL/L (ref 98–107)
CREAT SERPL-MCNC: 0.57 MG/DL (ref 0.67–1.17)
DEPRECATED HCO3 PLAS-SCNC: 22 MMOL/L (ref 22–29)
GFR SERPL CREATININE-BSD FRML MDRD: >90 ML/MIN/1.73M2
GLUCOSE SERPL-MCNC: 238 MG/DL (ref 70–99)
INR PPP: 1.31 (ref 0.85–1.15)
POTASSIUM SERPL-SCNC: 4.7 MMOL/L (ref 3.4–5.3)
PROT SERPL-MCNC: 5.4 G/DL (ref 6.4–8.3)
SODIUM SERPL-SCNC: 135 MMOL/L (ref 136–145)

## 2023-03-10 PROCEDURE — 82105 ALPHA-FETOPROTEIN SERUM: CPT | Performed by: INTERNAL MEDICINE

## 2023-03-10 PROCEDURE — 80321 ALCOHOLS BIOMARKERS 1OR 2: CPT | Mod: 90 | Performed by: PATHOLOGY

## 2023-03-10 PROCEDURE — 99215 OFFICE O/P EST HI 40 MIN: CPT | Mod: GC | Performed by: INTERNAL MEDICINE

## 2023-03-10 PROCEDURE — G0463 HOSPITAL OUTPT CLINIC VISIT: HCPCS | Performed by: INTERNAL MEDICINE

## 2023-03-10 PROCEDURE — 99000 SPECIMEN HANDLING OFFICE-LAB: CPT | Performed by: PATHOLOGY

## 2023-03-10 PROCEDURE — 80053 COMPREHEN METABOLIC PANEL: CPT | Performed by: PATHOLOGY

## 2023-03-10 PROCEDURE — 36415 COLL VENOUS BLD VENIPUNCTURE: CPT | Performed by: PATHOLOGY

## 2023-03-10 PROCEDURE — 82248 BILIRUBIN DIRECT: CPT | Performed by: PATHOLOGY

## 2023-03-10 PROCEDURE — 85610 PROTHROMBIN TIME: CPT | Performed by: PATHOLOGY

## 2023-03-10 NOTE — PROGRESS NOTES
[facility]  LIVER TRANSPLANT CLINIC    ===================================================================    HISTORY OF PRESENT ILLNESS:  I had the pleasure of seeing Mehdi Martinez for followup in the Liver Transplant Clinic at the [facility] on 03/10/2023    Mr. 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  He is status post liver-directed therapy.  He has undergone evaluation for liver transplantation, but the current concerns are severe frailty as well as altered mental status, he has been evaluated by neuropsych in the past. Per wife he is progressively declining with his memory and has difficulty to comprehend. His wife does report that although his mental status does fluctuate, he really seems quite confused most of the time.  He is often not sure what day it is and she is quite concerned about his unsteadiness on his feet.  His general neurologic decline seems to have been consistent since his fall about 1 year ago. He otherwise denies any abdominal pain, itching or skin rash.  He does have some fatigue.  He denies any increased abdominal girth or lower extremity edema.  He has not had any gastrointestinal bleeding.     He denies any fevers or chills, cough or shortness of breath.  He denies any nausea or vomiting and is having 1-2 bowel movements per day on his current dose of lactulose.  His appetite has been good and his weight for the most part has been stable. He takes lactulose once a day and some days skips it.       Imaging    MRI Abdomen                                                               IMPRESSION:       1. Cirrhotic hepatic morphology with evidence of portal hypertension.  TIPS in place. No ascites. No definite HCC.  2. Postablation changes involving segments 6 with lateral rim like  arterial enhancement along treatment zone, less conspicuous on today's  exam and favored  to be perfusion related/transient hepatic intensity  difference. LR-TR, equivocal. Continued short term follow up MRI with  Gadavist recommended.  3. Radio embolization changes in hepatic segment 4 without evidence of  residual or recurrent tumor. LIRADS TR-nonviable  4. Based on this exam only, the patient is within Ray criteria.  5. Stable subcentimeter cystic focus in the pancreatic body, likely a  side branch intraductal papillary mucinous neoplasm. Recommend  continued attention on follow-up.    A/P  Mr. 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  His MELD score is quite low and his tumor is under good control.  The major concern has been about his altered mental status. He shows no occult signs of HE.  He has no asterixis today and while there is some fluctuation, he has not been hospitalized for encephalopathy since he was last seen here. He is otherwise up to date with regard to vaccines and other cancer screening. Advised to up-titrate lactulose to have 2-5 BM per day, specially since it helps somewhat with his mentation when he takes it.     He and his wife are moving to [place] to be closer to their daughter so she can help with their ADLs as required. They will establish care at [facility]. Dr. Willard to speak with the hepatology provider at [facility].           Creatinine   Date Value Ref Range Status   03/10/2023 0.57 (L) 0.67 - 1.17 mg/dL Final   03/18/2021 0.70 0.66 - 1.25 mg/dL Final       ROS: 10 point ROS neg other than the symptoms noted above in the HPI.    OBJECTIVE  Vitals: /67 (BP Location: Right arm, Patient Position: Sitting, Cuff Size: Adult Regular)   Pulse 71   Wt 87.5 kg (193 lb)   SpO2 97%   BMI 26.93 kg/m    BMI= Body mass index is 26.93 kg/m .  GENERAL:  He looks chronically ill.  He remains somewhat hunched over and his affect is quite dull.    HEENT:  Shows no scleral icterus or temporal muscle wasting.  CHEST:  Clear.  ABDOMEN:  No increase in  girth.  No masses or tenderness to palpation are present.    EXTREMITIES:  No edema.  SKIN:  No stigmata of chronic liver disease.  NEUROLOGIC:  Shows no asterixis.  He is oriented x3    Creatinine   Date Value Ref Range Status   03/10/2023 0.57 (L) 0.67 - 1.17 mg/dL Final   03/18/2021 0.70 0.66 - 1.25 mg/dL Final   ]   Lab Results   Component Value Date    BILITOTAL 1.8 03/10/2023    BILITOTAL 1.5 01/19/2023    BILITOTAL 1.6 11/30/2022    BILITOTAL 1.7 11/03/2022    BILITOTAL 1.3 10/04/2022    BILITOTAL 1.4 03/18/2021    BILITOTAL 1.9 09/18/2020    BILITOTAL 1.2 03/13/2020    BILITOTAL 1.5 09/04/2019    BILITOTAL 1.0 03/19/2019      Lab Results   Component Value Date    ALT 26 03/10/2023    ALT 70 03/18/2021      Lab Results   Component Value Date    ALBUMIN 3.4 03/10/2023    ALBUMIN 3.4 07/05/2022    ALBUMIN 3.4 03/18/2021       Hemoglobin   Date Value Ref Range Status   11/03/2022 13.8 13.3 - 17.7 g/dL Final   03/18/2021 15.3 13.3 - 17.7 g/dL Final   ]    Current Outpatient Medications   Medication     aMILoride (MIDAMOR) 5 MG tablet     Bioflavonoid Products (VITAMIN C) CHEW     fluticasone (FLONASE) 50 MCG/ACT nasal spray     folic acid (FOLVITE) 1 MG tablet     lactulose encephalopathy (CHRONULAC) 10 GM/15ML SOLUTION     lisinopril (ZESTRIL) 20 MG tablet     metFORMIN (GLUCOPHAGE) 1000 MG tablet     methotrexate 2.5 MG tablet     pantoprazole (PROTONIX) 40 MG EC tablet     rifaximin (XIFAXAN) 550 MG TABS tablet     tiZANidine (ZANAFLEX) 2 MG tablet     zinc sulfate (ZINCATE) 220 MG capsule     No current facility-administered medications for this visit.     . MELD-Na score: 14 at 3/10/2023 10:20 AM  MELD score: 12 at 3/10/2023 10:20 AM  Calculated from:  Serum Creatinine: 0.57 mg/dL (Using min of 1 mg/dL) at 3/10/2023 10:20 AM  Serum Sodium: 135 mmol/L at 3/10/2023 10:20 AM  Total Bilirubin: 1.8 mg/dL at 3/10/2023 10:20 AM  INR(ratio): 1.31 at 3/10/2023 10:20 AM  Age: 67 years

## 2023-03-10 NOTE — NURSING NOTE
Chief Complaint   Patient presents with     RECHECK     Hep C     Blood pressure 137/67, pulse 71, weight 87.5 kg (193 lb), SpO2 97 %.    Oracio Ortiz on 3/10/2023 at 11:16 AM

## 2023-03-10 NOTE — LETTER
3/10/2023         RE: Mehdi Martinez  [address] [place] [address]        Dear Colleague,    Thank you for referring your patient, Mehdi Martinez, to the [facility]PAT[facility] [place]. Please see a copy of my visit note below.      [facility]  LIVER TRANSPLANT CLINIC    ===================================================================    HISTORY OF PRESENT ILLNESS:  I had the pleasure of seeing Mehdi Martinez for followup in the Liver Transplant Clinic at the [facility] on 03/10/2023    Mr. 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  He is status post liver-directed therapy.  He has undergone evaluation for liver transplantation, but the current concerns are severe frailty as well as altered mental status, he has been evaluated by neuropsych in the past. Per wife he is progressively declining with his memory and has difficulty to comprehend. His wife does report that although his mental status does fluctuate, he really seems quite confused most of the time.  He is often not sure what day it is and she is quite concerned about his unsteadiness on his feet.  His general neurologic decline seems to have been consistent since his fall about 1 year ago. He otherwise denies any abdominal pain, itching or skin rash.  He does have some fatigue.  He denies any increased abdominal girth or lower extremity edema.  He has not had any gastrointestinal bleeding.     He denies any fevers or chills, cough or shortness of breath.  He denies any nausea or vomiting and is having 1-2 bowel movements per day on his current dose of lactulose.  His appetite has been good and his weight for the most part has been stable. He takes lactulose once a day and some days skips it.       Imaging    MRI Abdomen                                                               IMPRESSION:       1.  Cirrhotic hepatic morphology with evidence of portal hypertension.  TIPS in place. No ascites. No definite HCC.  2. Postablation changes involving segments 6 with lateral rim like  arterial enhancement along treatment zone, less conspicuous on today's  exam and favored to be perfusion related/transient hepatic intensity  difference. LR-TR, equivocal. Continued short term follow up MRI with  [facility] recommended.  3. Radio embolization changes in hepatic segment 4 without evidence of  residual or recurrent tumor. LIRADS TR-nonviable  4. Based on this exam only, the patient is within Inman criteria.  5. Stable subcentimeter cystic focus in the pancreatic body, likely a  side branch intraductal papillary mucinous neoplasm. Recommend  continued attention on follow-up.    A/P  Mr. 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  His MELD score is quite low and his tumor is under good control.  The major concern has been about his altered mental status. He shows no occult signs of HE.  He has no asterixis today and while there is some fluctuation, he has not been hospitalized for encephalopathy since he was last seen here. He is otherwise up to date with regard to vaccines and other cancer screening. Advised to up-titrate lactulose to have 2-5 BM per day, specially since it helps somewhat with his mentation when he takes it.     He and his wife are moving to [place] to be closer to their daughter so she can help with their ADLs as required. They will establish care at [facility]. Dr. Willard to speak with the hepatology provider at [facility].           Creatinine   Date Value Ref Range Status   03/10/2023 0.57 (L) 0.67 - 1.17 mg/dL Final   03/18/2021 0.70 0.66 - 1.25 mg/dL Final       ROS: 10 point ROS neg other than the symptoms noted above in the HPI.    OBJECTIVE  Vitals: /67 (BP Location: Right arm, Patient Position: Sitting, Cuff Size: Adult Regular)   Pulse 71   Wt 87.5 kg (193 lb)   SpO2 97%    BMI 26.93 kg/m    BMI= Body mass index is 26.93 kg/m .  GENERAL:  He looks chronically ill.  He remains somewhat hunched over and his affect is quite dull.    HEENT:  Shows no scleral icterus or temporal muscle wasting.  CHEST:  Clear.  ABDOMEN:  No increase in girth.  No masses or tenderness to palpation are present.    EXTREMITIES:  No edema.  SKIN:  No stigmata of chronic liver disease.  NEUROLOGIC:  Shows no asterixis.  He is oriented x3    Creatinine   Date Value Ref Range Status   03/10/2023 0.57 (L) 0.67 - 1.17 mg/dL Final   03/18/2021 0.70 0.66 - 1.25 mg/dL Final   ]   Lab Results   Component Value Date    BILITOTAL 1.8 03/10/2023    BILITOTAL 1.5 01/19/2023    BILITOTAL 1.6 11/30/2022    BILITOTAL 1.7 11/03/2022    BILITOTAL 1.3 10/04/2022    BILITOTAL 1.4 03/18/2021    BILITOTAL 1.9 09/18/2020    BILITOTAL 1.2 03/13/2020    BILITOTAL 1.5 09/04/2019    BILITOTAL 1.0 03/19/2019      Lab Results   Component Value Date    ALT 26 03/10/2023    ALT 70 03/18/2021      Lab Results   Component Value Date    ALBUMIN 3.4 03/10/2023    ALBUMIN 3.4 07/05/2022    ALBUMIN 3.4 03/18/2021       Hemoglobin   Date Value Ref Range Status   11/03/2022 13.8 13.3 - 17.7 g/dL Final   03/18/2021 15.3 13.3 - 17.7 g/dL Final   ]    Current Outpatient Medications   Medication     aMILoride (MIDAMOR) 5 MG tablet     Bioflavonoid Products (VITAMIN C) CHEW     fluticasone (FLONASE) 50 MCG/ACT nasal spray     folic acid (FOLVITE) 1 MG tablet     lactulose encephalopathy (CHRONULAC) 10 GM/15ML SOLUTION     lisinopril (ZESTRIL) 20 MG tablet     metFORMIN (GLUCOPHAGE) 1000 MG tablet     methotrexate 2.5 MG tablet     pantoprazole (PROTONIX) 40 MG EC tablet     rifaximin (XIFAXAN) 550 MG TABS tablet     tiZANidine (ZANAFLEX) 2 MG tablet     zinc sulfate (ZINCATE) 220 MG capsule     No current facility-administered medications for this visit.     . MELD-Na score: 14 at 3/10/2023 10:20 AM  MELD score: 12 at 3/10/2023 10:20 AM  Calculated  from:  Serum Creatinine: 0.57 mg/dL (Using min of 1 mg/dL) at 3/10/2023 10:20 AM  Serum Sodium: 135 mmol/L at 3/10/2023 10:20 AM  Total Bilirubin: 1.8 mg/dL at 3/10/2023 10:20 AM  INR(ratio): 1.31 at 3/10/2023 10:20 AM  Age: 67 years        Attestation signed by Juancho Willard MD at 3/12/2023  4:36 PM:  The patient was seen and examined.  The above assessment and plan was developed jointly with the resident and the fellow.      I did spend total of 40 minutes (on the date of the encounter), including 30 minutes of face-to-face clinic time including counseling. The rest of the time was spent in documentation and review of records.     Thank you very much for allowing me to participate in the care of this patient.  If you have any questions regarding recommendations, please do not hesitate to contact me.         Juancho Willard MD      Professor of Medicine  [employer]      Executive Medical Director, Solid Organ Transplant Program  [facility]

## 2023-03-12 DIAGNOSIS — C22.0 HCC (HEPATOCELLULAR CARCINOMA) (H): Primary | ICD-10-CM

## 2023-03-13 LAB
PLPETH BLD-MCNC: <10 NG/ML
POPETH BLD-MCNC: <10 NG/ML

## 2023-04-03 ENCOUNTER — TELEPHONE (OUTPATIENT)
Dept: TRANSPLANT | Facility: CLINIC | Age: 68
End: 2023-04-03
Payer: COMMERCIAL

## 2023-04-03 DIAGNOSIS — K76.82 HEPATIC ENCEPHALOPATHY (H): ICD-10-CM

## 2023-04-03 NOTE — TELEPHONE ENCOUNTER
Spoke with spouse and patient    They have moved to [place] permanently as of last week.      They have not established care yet with PCP or Hepatology there. Needed refill for Rfaximin- sent to [facility] in [place] per their choice.        See message from January- Dr. Willard had previously recommended [facility] in [place] or [facility], encouraged them to call ASAP to get appointments to establish care, due for HCC surveillance imaging at end of April.

## 2023-04-05 ENCOUNTER — TELEPHONE (OUTPATIENT)
Dept: TRANSPLANT | Facility: CLINIC | Age: 68
End: 2023-04-05
Payer: COMMERCIAL

## 2023-04-05 NOTE — TELEPHONE ENCOUNTER
Call patient at this time.   Prescription for Rifaximin was sent to [facility] in [place], [place] per patients request.   Patients Transplant Coordinator, Shante did call [facility] in [place] to confirm prescription was received.     MAGDALENA Barahona, LPN   Pre-Liver/TPIAT Transplant   Phone: [phone]  Fax: [phone]      Normal rate, regular rhythm, normal S1, S2 heart sounds heard.

## 2023-04-12 ENCOUNTER — TELEPHONE (OUTPATIENT)
Dept: GASTROENTEROLOGY | Facility: CLINIC | Age: 68
End: 2023-04-12
Payer: COMMERCIAL

## 2023-04-12 NOTE — TELEPHONE ENCOUNTER
Checked with Dr. Willard regarding doctor patient should see in [place]. Dr. Willard had recommended either [facility] or [facility], but didn't recommend a specific provider. Updated patient's wife Felicia.    Morena HAWKINS LPN  Hepatology Clinic      ---------  [facility] Call Center    Phone Message    May a detailed message be left on voicemail: yes     Reason for Call: Other: Patient's spouse, Felicia, called as Dr. Willard had recommended a doctor for patient in [place]; however, they do not have access to [employer] anymore and cannot access the info.  They would like someone to call them back with name and number of doctor.     Action Taken: Message routed to:  [facility]    Travel Screening: Not Applicable

## 2023-04-13 DIAGNOSIS — K74.60 CIRRHOSIS OF LIVER WITHOUT ASCITES, UNSPECIFIED HEPATIC CIRRHOSIS TYPE (H): Primary | ICD-10-CM

## 2023-04-13 DIAGNOSIS — C22.0 HEPATOCELLULAR CARCINOMA (H): ICD-10-CM

## 2023-04-14 ENCOUNTER — DOCUMENTATION ONLY (OUTPATIENT)
Dept: GASTROENTEROLOGY | Facility: CLINIC | Age: 68
End: 2023-04-14
Payer: COMMERCIAL

## 2023-04-14 NOTE — PROGRESS NOTES
Faxed referral to [facility] hepatology clinic along with most recent OV note, labs, and MRI. As requested by patient's wife. Patient has now moved to [place] and is in the process of establishing care with a new hepatologist.    Morena HAWKINS LPN  Hepatology Clinic

## 2023-05-02 ENCOUNTER — DOCUMENTATION ONLY (OUTPATIENT)
Dept: TRANSPLANT | Facility: CLINIC | Age: 68
End: 2023-05-02
Payer: COMMERCIAL

## 2023-05-02 ENCOUNTER — COMMITTEE REVIEW (OUTPATIENT)
Dept: TRANSPLANT | Facility: CLINIC | Age: 68
End: 2023-05-02
Payer: COMMERCIAL

## 2023-05-02 NOTE — COMMITTEE REVIEW
Abdominal Committee Review Note     Evaluation Date: 11/30/2021  Committee Review Date: 5/2/2023    Organ being evaluated for: Liver    Transplant Phase: Waitlist  Transplant Status: Inactive    Transplant Coordinator: Evy Tamez  Transplant Surgeon:       Referring Physician: Marlo Hoang    Primary Diagnosis: Primary Liver Malignancy: Hepatoma (HCC) and Cirrhosis  Secondary Diagnosis: Cirrhosis: Type C    Committee Review Members:  Pharmacist Trenton Rodarte, [facility]    - Clinical Astrid Lucero, MSW, Lolis Tian, [facility]   Transplant Swathi Lee, ABIMBOLA, Evy Tamez, RN, Alice Mcintosh LPN, Nasra Ruby, RN, Kaylee Dupree, RN, Jr Jesus Woodward, RN, Emilia Espinosa, APRN CNP, Theron Tenorio, RN, Zhanna Wing, DANNY   Transplant Hepatology  Natacha Farah MD, Nighat Simon MD, Jana Winter MD, Dayton Szymanski MD, Tima Glaser MD, Thomas M. Leventhal, MD   Transplant Surgery Sudhakar Garay MD, Gale Sampson, DANNIE, Ned Mallika Jerry MD, Sae Belle MD, Rimma Matt MD, MD       Transplant Eligibility: Cirrhosis with MELD, ETOH, HCC, HCV    Committee Review Decision: Remove    Relative Contraindications: Other    Absolute Contraindications: Other    Committee Chair Leventhal, Thomas Michael, MD verbally attested to the committee's decision.    Committee Discussion Details:      Patient has been on hold due to ongoing neurological concerns and frailty post accident, and patient has now moved to [place] and will continue liver care there.  Will de-list from [facility] transplant listing

## 2023-05-02 NOTE — PROGRESS NOTES
Patient removed from [facility] wait list has been inactive due to significant frailty, and now patient has moved out of state/transplant area    This previously discussed with patient/family on this plan, letter sent

## 2023-05-02 NOTE — LETTER
May 2, 2023    Mehdi R Juan  [address]  [place] [address]    Dear Mr. Martinez,    The purpose of this letter is to let you know the [facility] Multi-Disciplinary Selection Team made the decision to remove you from the liver transplant list.  This is because you have ongoing weakness/frailty as well as have moved out of the [place] area.   Important things you should know:  If you would like to discuss the decision, or if your medical status changes, you may call [phone] and ask to speak to your transplant coordinator.  We recommend that you continue to follow up with your primary care and referring physicians in order to manage your health concerns.  We want you to know that our program has physician and surgeon coverage 24 hours a day, 365 days a year. In addition, our transplant surgeons and physicians will not be on call for two or more transplant programs more than 30 miles apart unless the circumstances have been reviewed and approved by the [employer] Organ [employer]aring ([employer]) Membership and Professional Standards Committee (MPSC). Finally, our primary physician and primary surgeons are not designated as the primary surgeon or primary physician at more than 1 transplant hospital. If this coverage changes or there are substantial program changes, you will be notified in writing by letter.   Attached is a letter from [employer] that describes the services and information offered to patients by [facility] and the Organ Procurement and Transplantation Network (OPTN).  Thank you for allowing us to participate in your care.  We wish you well.  Sincerely,  Shante Tamez RN, BSN  Pre-Liver Transplant Coordinator  Phone: [phone]     Liver Transplant Team  Enclosure: [facility] letter    CC:   *Care Team        The Organ Procurement and Transplantation Network  Toll-free patient services line:     Your resource for organ transplant information    If you have a question regarding your own medical care, you  always should call your transplant hospital first. However, for general organ transplant-related information, you can call the Organ Procurement and Transplantation Network (OPTN) toll-free patient services line at [phone]- [phone]. Anyone, including potential transplant candidates, candidates, recipients, family members, friends, living donors, and donor family members, can call this number to:      Talk about organ donation, living donation, the transplant process, the donation process, and transplant policies.  Get a free patient information kit with helpful booklets, waiting list and transplant information, and a list of all transplant hospitals.  Ask questions about the OPTN website (https://optn.transplant.hrsa.gov/), the [employer] for Organ Sharing s (UNOS) website (https://unos.org/), or the [employer]OS website for living donors and transplant recipients. (https://www.transplantliving.org/).  Learn how the OPTN can help you.  Talk about any concerns that you may have with a transplant hospital.    The [facility] transplant system, the OPTN, is managed under federal contract by the [employer] Organ Sharing (UNOS), which is a non-profit charitable organization. The OPTN helps create and define organ sharing policies that make the best use of donated organs. This process continuously evaluating new advances and discoveries so policies can be adapted to best serve patients waiting for transplants. To do so, the OPTN works closely with transplant professionals, transplant patients, transplant candidates, donor families, living donors, and the public. All transplant programs and organ procurement organizations throughout the country are OPTN members and are obligated to follow the policies the OPTN creates for allocating organs.    The OPTN also is responsible for:    Providing educational material for patients, the public, and professionals.  Raising awareness of the need for donated organs and  tissue.  Coordinating organ procurement, matching, and placement.  Collecting information about every organ transplant and donation that occurs in the [place].    Remember, you should contact your transplant hospital directly if you have questions or concerns about your own medical care including medical records, work-up progress, and test results.    We are not your transplant hospital, and our staff will not be able to answer questions about your case, so please keep your transplant hospital s phone number handy.    However, while you research your transplant needs and learn as much as you can about transplantation and donation, we welcome your call to our toll-free patient services line at [phone].          Updated 4/1/2019

## 2023-06-03 ENCOUNTER — HEALTH MAINTENANCE LETTER (OUTPATIENT)
Age: 68
End: 2023-06-03

## 2023-12-16 ENCOUNTER — HEALTH MAINTENANCE LETTER (OUTPATIENT)
Age: 68
End: 2023-12-16

## 2024-07-13 ENCOUNTER — HEALTH MAINTENANCE LETTER (OUTPATIENT)
Age: 69
End: 2024-07-13

## 2025-02-09 ENCOUNTER — HEALTH MAINTENANCE LETTER (OUTPATIENT)
Age: 70
End: 2025-02-09

## 2025-07-19 ENCOUNTER — HEALTH MAINTENANCE LETTER (OUTPATIENT)
Age: 70
End: 2025-07-19

## 2025-08-30 ENCOUNTER — HEALTH MAINTENANCE LETTER (OUTPATIENT)
Age: 70
End: 2025-08-30

## (undated) DEVICE — Device

## (undated) DEVICE — KIT HAND CONTROL ACIST 014644 AR-P54

## (undated) DEVICE — TUBING PRESSURE 30"

## (undated) DEVICE — SLEEVE TR BAND RADIAL COMPRESSION DEVICE 24CM TRB24-REG

## (undated) DEVICE — PACK HEART LEFT CUSTOM

## (undated) DEVICE — MANIFOLD KIT ANGIO AUTOMATED 014613

## (undated) DEVICE — INTRO GLIDESHEATH SLENDER 6FR 10X45CM 60-1060

## (undated) RX ORDER — HEPARIN SODIUM 1000 [USP'U]/ML
INJECTION, SOLUTION INTRAVENOUS; SUBCUTANEOUS
Status: DISPENSED
Start: 2022-01-21

## (undated) RX ORDER — FENTANYL CITRATE 50 UG/ML
INJECTION, SOLUTION INTRAMUSCULAR; INTRAVENOUS
Status: DISPENSED
Start: 2022-09-27

## (undated) RX ORDER — PROPOFOL 10 MG/ML
INJECTION, EMULSION INTRAVENOUS
Status: DISPENSED
Start: 2022-09-27

## (undated) RX ORDER — LIDOCAINE HYDROCHLORIDE 10 MG/ML
INJECTION, SOLUTION EPIDURAL; INFILTRATION; INTRACAUDAL; PERINEURAL
Status: DISPENSED
Start: 2021-12-14

## (undated) RX ORDER — NITROGLYCERIN 5 MG/ML
VIAL (ML) INTRAVENOUS
Status: DISPENSED
Start: 2021-12-13

## (undated) RX ORDER — HEPARIN SODIUM 1000 [USP'U]/ML
INJECTION, SOLUTION INTRAVENOUS; SUBCUTANEOUS
Status: DISPENSED
Start: 2021-12-13

## (undated) RX ORDER — LIDOCAINE HYDROCHLORIDE 10 MG/ML
INJECTION, SOLUTION EPIDURAL; INFILTRATION; INTRACAUDAL; PERINEURAL
Status: DISPENSED
Start: 2021-12-13

## (undated) RX ORDER — LIDOCAINE HYDROCHLORIDE 10 MG/ML
INJECTION, SOLUTION EPIDURAL; INFILTRATION; INTRACAUDAL; PERINEURAL
Status: DISPENSED
Start: 2022-09-27

## (undated) RX ORDER — VERAPAMIL HYDROCHLORIDE 2.5 MG/ML
INJECTION, SOLUTION INTRAVENOUS
Status: DISPENSED
Start: 2021-12-13

## (undated) RX ORDER — VERAPAMIL HYDROCHLORIDE 2.5 MG/ML
INJECTION, SOLUTION INTRAVENOUS
Status: DISPENSED
Start: 2021-12-14

## (undated) RX ORDER — NITROGLYCERIN 5 MG/ML
VIAL (ML) INTRAVENOUS
Status: DISPENSED
Start: 2021-12-14

## (undated) RX ORDER — SODIUM CHLORIDE 9 MG/ML
INJECTION, SOLUTION INTRAVENOUS
Status: DISPENSED
Start: 2021-12-14

## (undated) RX ORDER — CEFAZOLIN SODIUM/WATER 2 G/20 ML
SYRINGE (ML) INTRAVENOUS
Status: DISPENSED
Start: 2022-09-27

## (undated) RX ORDER — SODIUM CHLORIDE 9 MG/ML
INJECTION, SOLUTION INTRAVENOUS
Status: DISPENSED
Start: 2021-12-13

## (undated) RX ORDER — HEPARIN SODIUM 200 [USP'U]/100ML
INJECTION, SOLUTION INTRAVENOUS
Status: DISPENSED
Start: 2021-12-14

## (undated) RX ORDER — GLYCOPYRROLATE 0.2 MG/ML
INJECTION, SOLUTION INTRAMUSCULAR; INTRAVENOUS
Status: DISPENSED
Start: 2022-09-27

## (undated) RX ORDER — FENTANYL CITRATE 50 UG/ML
INJECTION, SOLUTION INTRAMUSCULAR; INTRAVENOUS
Status: DISPENSED
Start: 2021-12-14

## (undated) RX ORDER — DOBUTAMINE HYDROCHLORIDE 200 MG/100ML
INJECTION INTRAVENOUS
Status: DISPENSED
Start: 2021-11-29

## (undated) RX ORDER — METOPROLOL TARTRATE 1 MG/ML
INJECTION, SOLUTION INTRAVENOUS
Status: DISPENSED
Start: 2021-11-29

## (undated) RX ORDER — NICARDIPINE HCL-0.9% SOD CHLOR 1 MG/10 ML
SYRINGE (ML) INTRAVENOUS
Status: DISPENSED
Start: 2022-01-21

## (undated) RX ORDER — ONDANSETRON 2 MG/ML
INJECTION INTRAMUSCULAR; INTRAVENOUS
Status: DISPENSED
Start: 2022-09-27

## (undated) RX ORDER — FENTANYL CITRATE-0.9 % NACL/PF 10 MCG/ML
PLASTIC BAG, INJECTION (ML) INTRAVENOUS
Status: DISPENSED
Start: 2022-09-27

## (undated) RX ORDER — ONDANSETRON 2 MG/ML
INJECTION INTRAMUSCULAR; INTRAVENOUS
Status: DISPENSED
Start: 2021-12-14

## (undated) RX ORDER — ATROPINE SULFATE 0.4 MG/ML
AMPUL (ML) INJECTION
Status: DISPENSED
Start: 2021-11-29

## (undated) RX ORDER — DEXAMETHASONE SODIUM PHOSPHATE 4 MG/ML
INJECTION, SOLUTION INTRA-ARTICULAR; INTRALESIONAL; INTRAMUSCULAR; INTRAVENOUS; SOFT TISSUE
Status: DISPENSED
Start: 2022-09-27

## (undated) RX ORDER — HEPARIN SODIUM 1000 [USP'U]/ML
INJECTION, SOLUTION INTRAVENOUS; SUBCUTANEOUS
Status: DISPENSED
Start: 2021-12-14

## (undated) RX ORDER — ASPIRIN 325 MG
TABLET ORAL
Status: DISPENSED
Start: 2022-01-21

## (undated) RX ORDER — FENTANYL CITRATE 50 UG/ML
INJECTION, SOLUTION INTRAMUSCULAR; INTRAVENOUS
Status: DISPENSED
Start: 2022-01-21

## (undated) RX ORDER — NITROGLYCERIN 5 MG/ML
VIAL (ML) INTRAVENOUS
Status: DISPENSED
Start: 2022-01-21

## (undated) RX ORDER — SODIUM CHLORIDE 9 MG/ML
INJECTION, SOLUTION INTRAVENOUS
Status: DISPENSED
Start: 2022-01-21

## (undated) RX ORDER — PANTOPRAZOLE SODIUM 40 MG/10ML
INJECTION, POWDER, LYOPHILIZED, FOR SOLUTION INTRAVENOUS
Status: DISPENSED
Start: 2021-12-14

## (undated) RX ORDER — OXYCODONE HYDROCHLORIDE 5 MG/1
TABLET ORAL
Status: DISPENSED
Start: 2022-09-27

## (undated) RX ORDER — HEPARIN SODIUM 200 [USP'U]/100ML
INJECTION, SOLUTION INTRAVENOUS
Status: DISPENSED
Start: 2021-12-13

## (undated) RX ORDER — LIDOCAINE HYDROCHLORIDE 20 MG/ML
INJECTION, SOLUTION EPIDURAL; INFILTRATION; INTRACAUDAL; PERINEURAL
Status: DISPENSED
Start: 2022-09-27

## (undated) RX ORDER — FENTANYL CITRATE 50 UG/ML
INJECTION, SOLUTION INTRAMUSCULAR; INTRAVENOUS
Status: DISPENSED
Start: 2021-12-13

## (undated) RX ORDER — AMPICILLIN AND SULBACTAM 2; 1 G/1; G/1
INJECTION, POWDER, FOR SOLUTION INTRAMUSCULAR; INTRAVENOUS
Status: DISPENSED
Start: 2021-12-14

## (undated) RX ORDER — KETOROLAC TROMETHAMINE 30 MG/ML
INJECTION, SOLUTION INTRAMUSCULAR; INTRAVENOUS
Status: DISPENSED
Start: 2022-09-27